# Patient Record
Sex: MALE | Race: WHITE | NOT HISPANIC OR LATINO | Employment: UNEMPLOYED | URBAN - NONMETROPOLITAN AREA
[De-identification: names, ages, dates, MRNs, and addresses within clinical notes are randomized per-mention and may not be internally consistent; named-entity substitution may affect disease eponyms.]

---

## 2017-01-31 ENCOUNTER — OFFICE VISIT (OUTPATIENT)
Dept: FAMILY MEDICINE CLINIC | Facility: CLINIC | Age: 77
End: 2017-01-31

## 2017-01-31 VITALS
OXYGEN SATURATION: 99 % | SYSTOLIC BLOOD PRESSURE: 135 MMHG | BODY MASS INDEX: 17.43 KG/M2 | HEART RATE: 81 BPM | HEIGHT: 68 IN | DIASTOLIC BLOOD PRESSURE: 78 MMHG | WEIGHT: 115 LBS

## 2017-01-31 DIAGNOSIS — B35.1 ONYCHOMYCOSIS: ICD-10-CM

## 2017-01-31 DIAGNOSIS — N28.9 RENAL INSUFFICIENCY: ICD-10-CM

## 2017-01-31 DIAGNOSIS — IMO0001 ELEVATED BLOOD PRESSURE: ICD-10-CM

## 2017-01-31 DIAGNOSIS — E03.8 OTHER SPECIFIED HYPOTHYROIDISM: Primary | ICD-10-CM

## 2017-01-31 DIAGNOSIS — E78.2 MIXED HYPERLIPIDEMIA: ICD-10-CM

## 2017-01-31 DIAGNOSIS — R74.8 ELEVATED LIVER ENZYMES: ICD-10-CM

## 2017-01-31 DIAGNOSIS — J43.8 OTHER EMPHYSEMA (HCC): ICD-10-CM

## 2017-01-31 LAB
ALBUMIN SERPL-MCNC: 4 G/DL (ref 3.4–4.8)
ALBUMIN/GLOB SERPL: 1.3 G/DL (ref 1.5–2.5)
ALP SERPL-CCNC: 86 U/L (ref 46–116)
ALT SERPL W P-5'-P-CCNC: 14 U/L (ref 10–44)
ANION GAP SERPL CALCULATED.3IONS-SCNC: 1.4 MMOL/L (ref 3.6–11.2)
AST SERPL-CCNC: 24 U/L (ref 10–34)
BILIRUB SERPL-MCNC: 0.8 MG/DL (ref 0.2–1.8)
BUN BLD-MCNC: 14 MG/DL (ref 7–21)
BUN/CREAT SERPL: 12 (ref 7–25)
CALCIUM SPEC-SCNC: 9.5 MG/DL (ref 7.7–10)
CHLORIDE SERPL-SCNC: 108 MMOL/L (ref 99–112)
CO2 SERPL-SCNC: 32.6 MMOL/L (ref 24.3–31.9)
CREAT BLD-MCNC: 1.17 MG/DL (ref 0.43–1.29)
GFR SERPL CREATININE-BSD FRML MDRD: 61 ML/MIN/1.73
GLOBULIN UR ELPH-MCNC: 3.1 GM/DL
GLUCOSE BLD-MCNC: 72 MG/DL (ref 70–110)
OSMOLALITY SERPL CALC.SUM OF ELEC: 282.1 MOSM/KG (ref 273–305)
POTASSIUM BLD-SCNC: 3.9 MMOL/L (ref 3.5–5.3)
PROT SERPL-MCNC: 7.1 G/DL (ref 6–8)
SODIUM BLD-SCNC: 142 MMOL/L (ref 135–153)
T4 FREE SERPL-MCNC: 1.93 NG/DL (ref 0.89–1.76)
TSH SERPL DL<=0.05 MIU/L-ACNC: 0.06 MIU/ML (ref 0.55–4.78)

## 2017-01-31 PROCEDURE — 80053 COMPREHEN METABOLIC PANEL: CPT | Performed by: FAMILY MEDICINE

## 2017-01-31 PROCEDURE — 99214 OFFICE O/P EST MOD 30 MIN: CPT | Performed by: FAMILY MEDICINE

## 2017-01-31 PROCEDURE — 84439 ASSAY OF FREE THYROXINE: CPT | Performed by: FAMILY MEDICINE

## 2017-01-31 PROCEDURE — 84443 ASSAY THYROID STIM HORMONE: CPT | Performed by: FAMILY MEDICINE

## 2017-01-31 PROCEDURE — 36415 COLL VENOUS BLD VENIPUNCTURE: CPT | Performed by: FAMILY MEDICINE

## 2017-01-31 RX ORDER — CLOTRIMAZOLE 1 %
CREAM (GRAM) TOPICAL 2 TIMES DAILY
Qty: 45 G | Refills: 0 | Status: SHIPPED | OUTPATIENT
Start: 2017-01-31 | End: 2019-01-01

## 2017-02-06 ENCOUNTER — TELEPHONE (OUTPATIENT)
Dept: FAMILY MEDICINE CLINIC | Facility: CLINIC | Age: 77
End: 2017-02-06

## 2017-02-06 NOTE — TELEPHONE ENCOUNTER
----- Message from Deloris Lewis MD sent at 2/5/2017  7:02 PM EST -----  Please call Onel. His thyroid is underactive again - can we increase his thyroid medication from 150 to 175 mcg orally daily? #30 with 2 refills. I'd like to see him again in 3 months so can we make him appt? Can send to pharm if he is okay with the increase. Thanks.      Phone not accepting calls at this time will attempt later.    Still not available.    Attempted number again & stated unavailable & no way to leave a message.      Letter mailed to contact the office.

## 2017-02-13 NOTE — PROGRESS NOTES
"Onel Erickson     VITALS: Blood pressure 135/78, pulse 81, height 68\" (172.7 cm), weight 115 lb (52.2 kg), SpO2 99 %.    Subjective  Chief Complaint:   Chief Complaint   Patient presents with   • Follow-up        History of Present Illness:  Patient is a 76 y.o. male with a medical history significant for CAD and COPD who presents to clinic secondary to medical followup. No new or acute concerns today. Doing well.     Patient also has a history of hyperlipidemia and is currently on atorvastatin 20 mg orally daily. Denies any side effects of the medications. Last lipid panel in 8/2016 and was ok. Denies any muscle weakness, jaundice or itching.   Low cholesterol diet: Yes    Patient does have a history of hypothyroidism and is currently on synthroid 150 mcg orally daily. He is doing well on this medication. Denies any side effects. Denies fatigue, dizziness, palpitations, changes in weight, hair, or nails. Last thyroid panel was in 8/2016 and was abnormal and changed at that point.    Patient has a history of COPD and is currently on Advair 250/50 1 puff twice daily and Spiriva daily. Denies any side effects of the medications. Denies any shortness of breath, coughing, wheezing, or night coughing. Reports that in the past CXR has shown \"fungus spots on his lungs\".  Exacerbation: No  Last utilized albuterol: Does not have albuterol    Patient has a history of CAD. He sees Dr. Harley, and is currently on Aspirin 81 mg daily. He does have an extensive history, including a cath, 5 bypasses, along with a history of PAD with stents in both legs along with a left arterial bypass in his lower extremity. In the past, he was on plavix, digoxin, imdur, and lasix, but Dr. Harley has since taken him off of these medications as he has been extremely stable in the past. Last saw Dr. Harley some months ago. He quit smoking in 2012.  History of stroke: Yes    The following portions of the patient's history were " reviewed and updated as appropriate: allergies, current medications, past family history, past medical history, past social history, past surgical history and problem list.    Past Medical History  Past Medical History   Diagnosis Date   • Arthritis    • CAD (coronary artery disease)      Sees Dr. Harley   • COPD (chronic obstructive pulmonary disease)    • Former smoker      Stopped in 2012   • Hernia of abdominal wall    • History of TIAs    • Hyperlipidemia    • Hypertension    • Hypothyroidism    • Inguinal hernia    • PAD (peripheral artery disease)    • Ruptured lumbar disc    • Scrotal hernia      Left   • Stroke 2011     10 YEARS AGO   • Ventral hernia        Review of Systems  Constitutional: Denies any recent history of HAs, dizziness, fevers, chills, itching.  Eyes: Denies any changes in vision. Denies any blurry vision or diplopia.  Ears, Nose, Mouth, Throat: Denies any sore throat, rhinorrhea, or cough.  Cardiovascular: Denies any chest pain, pressure, or palpitations.  Respiratory: Denies any shortness of breath or wheezing.  Gastrointestinal: Denies any abdominal pain, nausea, vomiting, diarrhea, or constipation.  Genitourinary: Denies any changes in urination.  Musculoskeletal: Denies any muscle weakness.  Skin and/or breasts: Denies any rashes.  Neurological: Denies any changes in balance or gait.  Psychiatric: Denies any anxiety, depression, or insomnia. Denies any suicidal or homicidal ideations.  Endocrine: Denies any heat or cold intolerance. Denies any voice changes, polydipsia, or polyuria.    Surgical History  Past Surgical History   Procedure Laterality Date   • Back surgery       age 30; spinal fusion   • Hernia repair       age 35; inguinal hernia   • Amputation  06/2012     left great toe amputation   • Cardiac surgery  08/2012     Open heart bypass; [facility]   • Lung biopsy  2012   • Arterial bypass surgery  10/2012     left leg - groin to ankle; Dr. Browne; [facility]  [place].   • Cataract extraction  03/07/2016   • Coronary artery bypass graft  2012     x5   • Inguinal hernia repair Left 9/15/2016     Procedure: INGUINAL HERNIA REPAIR;  Surgeon: Henry Bullock MD;  Location:  [facility];  Service:    • Ventral/incisional hernia repair N/A 9/15/2016     Procedure: VENTRAL/INCISIONAL HERNIA REPAIR;  Surgeon: Henry Bullock MD;  Location:  [facility];  Service:    • Carotid stent Bilateral    • Inguinal hernia repair Left 11/18/2016     Procedure: INGUINAL HERNIA REPAIR;  Surgeon: Henry Bullock MD;  Location:  [facility];  Service:        Family History  Family History   Problem Relation Age of Onset   • Alzheimer's disease Mother    • Heart disease Father        Social History  Social History     Social History   • Marital status: Unknown     Spouse name: N/A   • Number of children: N/A   • Years of education: N/A     Occupational History   • Not on file.     Social History Main Topics   • Smoking status: Former Smoker     Packs/day: 1.00     Years: 30.00     Types: Cigarettes     Quit date: 9/14/2006   • Smokeless tobacco: Never Used   • Alcohol use No   • Drug use: No   • Sexual activity: Defer     Other Topics Concern   • Not on file     Social History Narrative       Objective  Physical Exam  Gen: Patient in NAD. Pleasant and answers appropriately. A&Ox3.    Skin: Warm and dry with normal turgor. No purpura, rashes, or unusual pigmentation noted. Hair is normal in appearance and distribution.    HEENT: NC/AT. No lesions noted. Conjunctiva clear, sclera nonicteric. PERRL. O/P nonerythematous and moist without exudate.    Neck: Supple without lymph nodes palpated. FROM. No evidence of tracheal deviation or thyromegaly. Carotid pulses 2+/4 B/L without bruits.    Lungs: CTA B/L without rales, rhonchi, crackles, or wheezes.    Heart: RRR. S1 and S2 normal. No S3 or S4. No MRGT.    Abd: Soft, nontender,nondistended. (+)BSx4 quadrants. No scars or abnormalities noted. No HSM, masses, or  bruits noted.    Extrem: No CCE. FROMx4. No bone, joint, or muscle tenderness noted.    Neuro: No focal motor/sensory deficits.    Procedures    Assessment/Plan  Onel Erickson is a 76 y.o. here for medical followup.  Diagnoses and all orders for this visit:    1) Other specified hypothyroidism  -     Comprehensive Metabolic Panel  -     TSH  -     T4, Free  -     Osmolality, Calculated; Future  -     Osmolality, Calculated  Check thyroid panel and CMP today. Continue synthroid 150 mcg orally daily.     2) Onychomycosis  -     clotrimazole (LOTRIMIN) 1 % cream; Apply  topically 2 (Two) Times a Day. Over the toenailsDid not go to podiatry secondary to cost. Will prescribe clotrimazole BID over toenails. May need oral medications, but also has a history of elevated liver enzymes. May need to utilize topical solution.     3) History of tobacco abuse  Pending AAA screening - patient needs to call. Will need to also discuss Chest CT with patient given history of CXR.    4) Hyperlipidemia  Stable. Continue atorvastatin 20 mg orally daily. Next lipids 8/2017.    5) COPD  Stable. Continue spiriva daily and advair 2 puffs BID.    6) Elevated liver enzymes  Will check CMP today.    7) Kidney insufficiency history  Will check CMP today.    8) Elevated blood pressures without diagnosis of hypertension  Improved today. Will continue to monitor. If continued elevation, especially with CAD history, will need to place on medication .    9) Preventative Medicine  PSA 8/2016 WNL. Declines flu and pneumo vaccines. AAA screening pending.    Findings and plans discussed with patient who verbalizes understanding and agreement. Will followup with patient once results are in. Patient to followup at clinic PRN or in three months for medical followup.    Deloris Lewis MD

## 2017-06-06 ENCOUNTER — TELEPHONE (OUTPATIENT)
Dept: FAMILY MEDICINE CLINIC | Facility: CLINIC | Age: 77
End: 2017-06-06

## 2017-06-06 ENCOUNTER — OFFICE VISIT (OUTPATIENT)
Dept: FAMILY MEDICINE CLINIC | Facility: CLINIC | Age: 77
End: 2017-06-06

## 2017-06-06 VITALS
TEMPERATURE: 97.5 F | HEIGHT: 67 IN | DIASTOLIC BLOOD PRESSURE: 94 MMHG | BODY MASS INDEX: 17.11 KG/M2 | OXYGEN SATURATION: 96 % | SYSTOLIC BLOOD PRESSURE: 150 MMHG | WEIGHT: 109 LBS | HEART RATE: 95 BPM

## 2017-06-06 DIAGNOSIS — E03.8 OTHER SPECIFIED HYPOTHYROIDISM: ICD-10-CM

## 2017-06-06 DIAGNOSIS — R63.4 WEIGHT LOSS: ICD-10-CM

## 2017-06-06 DIAGNOSIS — M25.512 CHRONIC LEFT SHOULDER PAIN: Primary | ICD-10-CM

## 2017-06-06 DIAGNOSIS — G89.29 CHRONIC LEFT SHOULDER PAIN: Primary | ICD-10-CM

## 2017-06-06 DIAGNOSIS — E78.2 MIXED HYPERLIPIDEMIA: ICD-10-CM

## 2017-06-06 DIAGNOSIS — R74.8 ELEVATED LIVER ENZYMES: ICD-10-CM

## 2017-06-06 DIAGNOSIS — Z87.891 HISTORY OF TOBACCO ABUSE: ICD-10-CM

## 2017-06-06 DIAGNOSIS — N28.9 RENAL INSUFFICIENCY: ICD-10-CM

## 2017-06-06 LAB
ALBUMIN SERPL-MCNC: 4.3 G/DL (ref 3.4–4.8)
ALBUMIN/GLOB SERPL: 1.2 G/DL (ref 1.5–2.5)
ALP SERPL-CCNC: 93 U/L (ref 40–129)
ALT SERPL W P-5'-P-CCNC: 17 U/L (ref 10–44)
ANION GAP SERPL CALCULATED.3IONS-SCNC: 3 MMOL/L (ref 3.6–11.2)
AST SERPL-CCNC: 23 U/L (ref 10–34)
BASOPHILS # BLD AUTO: 0.02 10*3/MM3 (ref 0–0.3)
BASOPHILS NFR BLD AUTO: 0.3 % (ref 0–2)
BILIRUB SERPL-MCNC: 1.3 MG/DL (ref 0.2–1.8)
BUN BLD-MCNC: 16 MG/DL (ref 7–21)
BUN/CREAT SERPL: 14.8 (ref 7–25)
CALCIUM SPEC-SCNC: 10 MG/DL (ref 7.7–10)
CHLORIDE SERPL-SCNC: 105 MMOL/L (ref 99–112)
CO2 SERPL-SCNC: 30 MMOL/L (ref 24.3–31.9)
CREAT BLD-MCNC: 1.08 MG/DL (ref 0.43–1.29)
DEPRECATED RDW RBC AUTO: 48.2 FL (ref 37–54)
EOSINOPHIL # BLD AUTO: 0.14 10*3/MM3 (ref 0–0.7)
EOSINOPHIL NFR BLD AUTO: 2.1 % (ref 0–7)
ERYTHROCYTE [DISTWIDTH] IN BLOOD BY AUTOMATED COUNT: 13.8 % (ref 11.5–14.5)
GFR SERPL CREATININE-BSD FRML MDRD: 66 ML/MIN/1.73
GLOBULIN UR ELPH-MCNC: 3.5 GM/DL
GLUCOSE BLD-MCNC: 86 MG/DL (ref 70–110)
HCT VFR BLD AUTO: 46.2 % (ref 42–52)
HGB BLD-MCNC: 15.3 G/DL (ref 14–18)
IMM GRANULOCYTES # BLD: 0.01 10*3/MM3 (ref 0–0.03)
IMM GRANULOCYTES NFR BLD: 0.2 % (ref 0–0.5)
LYMPHOCYTES # BLD AUTO: 0.98 10*3/MM3 (ref 1–3)
LYMPHOCYTES NFR BLD AUTO: 15 % (ref 16–46)
MCH RBC QN AUTO: 31.6 PG (ref 27–33)
MCHC RBC AUTO-ENTMCNC: 33.1 G/DL (ref 33–37)
MCV RBC AUTO: 95.5 FL (ref 80–94)
MONOCYTES # BLD AUTO: 0.59 10*3/MM3 (ref 0.1–0.9)
MONOCYTES NFR BLD AUTO: 9 % (ref 0–12)
NEUTROPHILS # BLD AUTO: 4.8 10*3/MM3 (ref 1.4–6.5)
NEUTROPHILS NFR BLD AUTO: 73.4 % (ref 40–75)
OSMOLALITY SERPL CALC.SUM OF ELEC: 276.2 MOSM/KG (ref 273–305)
PLATELET # BLD AUTO: 129 10*3/MM3 (ref 130–400)
PMV BLD AUTO: 12 FL (ref 6–10)
POTASSIUM BLD-SCNC: 4.2 MMOL/L (ref 3.5–5.3)
PROT SERPL-MCNC: 7.8 G/DL (ref 6–8)
RBC # BLD AUTO: 4.84 10*6/MM3 (ref 4.7–6.1)
SODIUM BLD-SCNC: 138 MMOL/L (ref 135–153)
T4 FREE SERPL-MCNC: 2.21 NG/DL (ref 0.89–1.76)
TSH SERPL DL<=0.05 MIU/L-ACNC: 0.02 MIU/ML (ref 0.55–4.78)
WBC NRBC COR # BLD: 6.54 10*3/MM3 (ref 4.5–12.5)

## 2017-06-06 PROCEDURE — 80053 COMPREHEN METABOLIC PANEL: CPT | Performed by: FAMILY MEDICINE

## 2017-06-06 PROCEDURE — 85060 BLOOD SMEAR INTERPRETATION: CPT | Performed by: FAMILY MEDICINE

## 2017-06-06 PROCEDURE — 84443 ASSAY THYROID STIM HORMONE: CPT | Performed by: FAMILY MEDICINE

## 2017-06-06 PROCEDURE — 36415 COLL VENOUS BLD VENIPUNCTURE: CPT | Performed by: FAMILY MEDICINE

## 2017-06-06 PROCEDURE — 85025 COMPLETE CBC W/AUTO DIFF WBC: CPT | Performed by: FAMILY MEDICINE

## 2017-06-06 PROCEDURE — 84439 ASSAY OF FREE THYROXINE: CPT | Performed by: FAMILY MEDICINE

## 2017-06-06 PROCEDURE — 99214 OFFICE O/P EST MOD 30 MIN: CPT | Performed by: FAMILY MEDICINE

## 2017-06-06 RX ORDER — LEVOTHYROXINE SODIUM 175 UG/1
175 TABLET ORAL DAILY
Qty: 30 TABLET | Refills: 2 | Status: SHIPPED | OUTPATIENT
Start: 2017-06-06 | End: 2017-07-12 | Stop reason: SDUPTHER

## 2017-06-06 NOTE — TELEPHONE ENCOUNTER
----- Message from Deloris Lewis MD sent at 6/6/2017  3:37 PM EDT -----  Patient doesn't have a phone. Will have to send letter.  1) Thyroid is off. We are going to increase his synthroid to 175 mcg. New script at the pharmacy already.  2) He has low platelets; we will monitor.    Can you see if they can add on a peripheral blood smear. Thanks.        Added peripheral smear,letter mailed with above information.

## 2017-06-06 NOTE — PATIENT INSTRUCTIONS
Keep everything the same. If the thyroid is off, you will get a new dosage on your next medication.

## 2017-06-06 NOTE — PROGRESS NOTES
"Onel Erickson     VITALS: Blood pressure 150/94, pulse 95, temperature 97.5 °F (36.4 °C), temperature source Oral, height 67\" (170.2 cm), weight 109 lb (49.4 kg), SpO2 96 %.    Subjective  Chief Complaint:   Chief Complaint   Patient presents with   • Follow-up     Medication refill        History of Present Illness:  Patient is a 77 y.o. male with a medical history significant for CAD and COPD who presents to clinic secondary to medical followup. No new or acute concerns today. Doing well. He does complain of continued left shoulder pain from an old shoulder injury. He does stretches and exercises at home. Denies any new injury or trauma. Does not want to go to PT. States that it is well, but he would like a shoulder brace for it when it aggravates him.    Patient also has a history of hyperlipidemia and is currently on atorvastatin 20 mg orally daily. Denies any side effects of the medications. Last lipid panel in 8/2016 and was ok. Denies any muscle weakness, jaundice or itching.   Low cholesterol diet: Yes    Patient does have a history of hypothyroidism and is currently on synthroid 150 mcg orally daily. He is doing well on this medication. Denies any side effects. Denies fatigue, dizziness, palpitations, changes in weight, hair, or nails. Last thyroid panel was in 1/2017 and was abnormal. We had tried to call him to increase to 175 mcg but we could never reach him.     Patient has a history of COPD and is currently on Advair 250/50 1 puff twice daily and Spiriva daily. Denies any side effects of the medications. Denies any shortness of breath, coughing, wheezing, or night coughing. Reports that in the past CXR has shown \"fungus spots on his lungs\".  Exacerbation: No  Last utilized albuterol: Does not have albuterol    Patient has a history of CAD. He sees Dr. Harley, and is currently on Aspirin 81 mg daily. He does have an extensive history, including a cath, 5 bypasses, along with a history of PAD " with stents in both legs along with a left arterial bypass in his lower extremity. In the past, he was on plavix, digoxin, imdur, and lasix, but Dr. Harley has since taken him off of these medications as he has been extremely stable in the past. Last saw Dr. Harley some months ago. He quit smoking in 2012.  History of stroke: Yes     The following portions of the patient's history were reviewed and updated as appropriate: allergies, current medications, past family history, past medical history, past social history, past surgical history and problem list.    Past Medical History  Past Medical History:   Diagnosis Date   • Arthritis    • CAD (coronary artery disease)     Sees Dr. Harley   • COPD (chronic obstructive pulmonary disease)    • Former smoker     Stopped in 2012   • Hernia of abdominal wall    • History of TIAs    • Hyperlipidemia    • Hypertension    • Hypothyroidism    • Inguinal hernia    • PAD (peripheral artery disease)    • Ruptured lumbar disc    • Scrotal hernia     Left   • Stroke 2011    10 YEARS AGO   • Ventral hernia        Review of Systems  Constitutional: Denies any recent history of HAs, dizziness, fevers, chills, itching.  Eyes: Denies any changes in vision. Denies any blurry vision or diplopia.  Ears, Nose, Mouth, Throat: Denies any sore throat, rhinorrhea, or cough.  Cardiovascular: Denies any chest pain, pressure, or palpitations.  Respiratory: Denies any shortness of breath or wheezing.  Gastrointestinal: Denies any abdominal pain, nausea, vomiting, diarrhea, or constipation.  Skin and/or breasts: Denies any rashes.  Neurological: Denies any changes in balance or gait.  Psychiatric: Denies any anxiety, depression, or insomnia. Denies any suicidal or homicidal ideations.  Endocrine: Denies any heat or cold intolerance. Denies any voice changes, polydipsia, or polyuria.  Hematologic/Lymphatic: Denies any anemia or easy bruising.    Surgical History  Past Surgical History:    Procedure Laterality Date   • AMPUTATION  06/2012    left great toe amputation   • ARTERIAL BYPASS SURGERY  10/2012    left leg - groin to ankle; Dr. Browne; [facility].   • BACK SURGERY      age 30; spinal fusion   • CARDIAC SURGERY  08/2012    Open heart bypass; [facility]   • CAROTID STENT Bilateral    • CATARACT EXTRACTION  03/07/2016   • CORONARY ARTERY BYPASS GRAFT  2012    x5   • HERNIA REPAIR      age 35; inguinal hernia   • INGUINAL HERNIA REPAIR Left 9/15/2016    Procedure: INGUINAL HERNIA REPAIR;  Surgeon: Henry Bullock MD;  Location:  [facility];  Service:    • INGUINAL HERNIA REPAIR Left 11/18/2016    Procedure: INGUINAL HERNIA REPAIR;  Surgeon: Henry Bullock MD;  Location:  [facility];  Service:    • LUNG BIOPSY  2012   • VENTRAL/INCISIONAL HERNIA REPAIR N/A 9/15/2016    Procedure: VENTRAL/INCISIONAL HERNIA REPAIR;  Surgeon: Henry Bullock MD;  Location:  [facility];  Service:        Family History  Family History   Problem Relation Age of Onset   • Alzheimer's disease Mother    • Heart disease Father        Social History  Social History     Social History   • Marital status: Unknown     Spouse name: N/A   • Number of children: N/A   • Years of education: N/A     Occupational History   • Not on file.     Social History Main Topics   • Smoking status: Former Smoker     Packs/day: 1.00     Years: 30.00     Types: Cigarettes     Quit date: 9/14/2006   • Smokeless tobacco: Never Used   • Alcohol use No   • Drug use: No   • Sexual activity: Defer     Other Topics Concern   • Not on file     Social History Narrative       Objective  Physical Exam  Gen: Patient in NAD. Pleasant and answers appropriately. A&Ox3.    Skin: Warm and dry with normal turgor. No purpura, rashes, or unusual pigmentation noted. Hair is normal in appearance and distribution.    HEENT: NC/AT. No lesions noted. Conjunctiva clear, sclera nonicteric. PERRL. EOMI without nystagmus or strabismus. Fundi appear benign. No  hemorrhages or exudates of eyes. Auditory canals are patent bilaterally without lesions. TMs intact,  nonerythematous, nonbulging without lesions. Nasal mucosa pink, nonerythematous, and nonedematous. Frontal and maxillary sinuses are nontender. O/P nonerythematous and moist without exudate.    Neck: Supple without lymph nodes palpated. FROM.     Lungs: CTA B/L without rales, rhonchi, crackles, or wheezes.    Heart: RRR. S1 and S2 normal. No S3 or S4. No MRGT.    Abd: Soft, nontender,nondistended. (+)BSx4 quadrants.     Extrem: No CCE. Radial pulses 2+/4 and equal B/L. FROMx4. No bone, joint, or muscle tenderness noted. Left upper extremity: Shoulder ROM WNL. Abduction, adduction, external and internal rotation WNL. Benign drop arm, HK, Sulcus, Speeds.    Neuro: No focal motor/sensory deficits.    Procedures    Assessment/Plan  Onel Erickson is a 77 y.o. here for medical followup.  Diagnoses and all orders for this visit:     Left Shoulder pain  Patient declines PT and further workup. States that his exercises are doing him well. Will try a shoulder brace to help. Continue to monitor.    Weight Loss  Unexplained weight loss of 6 pounds. Patient states that he feels fine. States that he has a good appetite. Will check CBC and CMP. Continue to monitor.     Other specified hypothyroidism  - Comprehensive Metabolic Panel  - TSH  - T4, Free  Check thyroid panel and CMP today. Continue synthroid 150 mcg orally daily. Will most likely need to increase 175 mcg orally daily.     Onychomycosis  Did not go to podiatry secondary to cost. Continue clotrimazole BID over toenails. May need oral medications, but also has a history of elevated liver enzymes. May need to utilize topical solution.      History of tobacco abuse  Pending AAA screening - patient needs to call. Patient declines Chest CT at this time.     Hyperlipidemia  Stable. Continue atorvastatin 20 mg orally daily. Next lipids 8/2017.     COPD  Stable. Continue  spiriva daily and advair 2 puffs BID.     Elevated liver enzymes  Will check CMP today.     Kidney insufficiency history  Will check CMP today.     Elevated blood pressures without diagnosis of hypertension  Elevated today. Will continue to monitor. If continued elevation, especially with CAD history, will need to place on medication .     Preventative Medicine  PSA 8/2016 WNL. Declines flu and pneumo vaccines. AAA screening pending.    Findings and plans discussed with patient who verbalizes understanding and agreement. Will followup with patient once results are in. Patient to followup at clinic PRN or in three months for medical followup.      Deloris Lewis MD

## 2017-06-12 ENCOUNTER — OFFICE VISIT (OUTPATIENT)
Dept: FAMILY MEDICINE CLINIC | Facility: CLINIC | Age: 77
End: 2017-06-12

## 2017-06-12 VITALS
WEIGHT: 109 LBS | BODY MASS INDEX: 17.11 KG/M2 | OXYGEN SATURATION: 98 % | HEART RATE: 85 BPM | HEIGHT: 67 IN | DIASTOLIC BLOOD PRESSURE: 81 MMHG | SYSTOLIC BLOOD PRESSURE: 149 MMHG

## 2017-06-12 DIAGNOSIS — R03.0 ELEVATED BP WITHOUT DIAGNOSIS OF HYPERTENSION: ICD-10-CM

## 2017-06-12 DIAGNOSIS — E03.8 OTHER SPECIFIED HYPOTHYROIDISM: ICD-10-CM

## 2017-06-12 DIAGNOSIS — N28.9 RENAL INSUFFICIENCY: ICD-10-CM
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
## 2017-06-12 DIAGNOSIS — J43.8 OTHER EMPHYSEMA (HCC): ICD-10-CM

## 2017-06-12 DIAGNOSIS — E78.2 MIXED HYPERLIPIDEMIA: ICD-10-CM

## 2017-06-12 PROCEDURE — 99214 OFFICE O/P EST MOD 30 MIN: CPT | Performed by: FAMILY MEDICINE

## 2017-06-12 RX ORDER — ATORVASTATIN CALCIUM 20 MG/1
20 TABLET, FILM COATED ORAL DAILY
Qty: 30 TABLET | Refills: 5 | Status: SHIPPED | OUTPATIENT
Start: 2017-06-12 | End: 2017-09-25 | Stop reason: SDUPTHER

## 2017-06-12 RX ORDER — DOXYCYCLINE HYCLATE 100 MG/1
100 TABLET, DELAYED RELEASE ORAL 2 TIMES DAILY
Qty: 28 TABLET | Refills: 0 | Status: SHIPPED | OUTPATIENT
Start: 2017-06-12 | End: 2017-11-01

## 2017-07-10 NOTE — PROGRESS NOTES
"Onel Erickson     VITALS: Blood pressure 149/81, pulse 85, height 67\" (170.2 cm), weight 109 lb (49.4 kg), SpO2 98 %.    Subjective  Chief Complaint:   Chief Complaint   Patient presents with   • Insect Bite        History of Present Illness:  Patient is a 77 y.o. male with a medical history significant for CAD and COPD who presents to clinic secondary to medical followup. He is here today because he has had several tick bites on his legs that are now erythematous and has a bull-eyes look to them. He states that he thinks that he has gotten all of the ticks off of him.     Patient also has a history of hyperlipidemia and is currently on atorvastatin 20 mg orally daily. Denies any side effects of the medications. Last lipid panel in 8/2016 and was ok. Denies any muscle weakness, jaundice or itching.   Low cholesterol diet: Yes    Patient does have a history of hypothyroidism and is currently on synthroid 150 mcg orally daily. He is doing well on this medication. Denies any side effects. Denies fatigue, dizziness, palpitations, changes in weight, hair, or nails. Last thyroid panel was in 6/2017 and was abnormal. We had tried to call him to increase to 175 mcg but we could never reach him.     Patient has a history of COPD and is currently on Advair 250/50 1 puff twice daily and Spiriva daily. Denies any side effects of the medications. Denies any shortness of breath, coughing, wheezing, or night coughing. Reports that in the past CXR has shown \"fungus spots on his lungs\".  Exacerbation: No  Last utilized albuterol: Does not have albuterol    Patient has a history of CAD. He sees Dr. Harley, and is currently on Aspirin 81 mg daily. He does have an extensive history, including a cath, 5 bypasses, along with a history of PAD with stents in both legs along with a left arterial bypass in his lower extremity. In the past, he was on plavix, digoxin, imdur, and lasix, but Dr. Harley has since taken him off of " these medications as he has been extremely stable in the past. Last saw Dr. Harley some months ago. He quit smoking in 2012.  History of stroke: Yes     No complaints about any of the medications.    The following portions of the patient's history were reviewed and updated as appropriate: allergies, current medications, past family history, past medical history, past social history, past surgical history and problem list.    Past Medical History  Past Medical History:   Diagnosis Date   • Arthritis    • CAD (coronary artery disease)     Sees Dr. Harley   • COPD (chronic obstructive pulmonary disease)    • Former smoker     Stopped in 2012   • Hernia of abdominal wall    • History of TIAs    • Hyperlipidemia    • Hypertension    • Hypothyroidism    • Inguinal hernia    • PAD (peripheral artery disease)    • Ruptured lumbar disc    • Scrotal hernia     Left   • Stroke 2011    10 YEARS AGO   • Ventral hernia        Review of Systems  Constitutional: Denies any recent history of fevers, chills, itching.  Eyes: Denies any changes in vision. Denies any blurry vision or diplopia.  Ears, Nose, Mouth, Throat: Denies any sore throat, rhinorrhea, or cough.  Cardiovascular: Denies any chest pain, pressure, or palpitations.  Respiratory: Denies any shortness of breath or wheezing.  Gastrointestinal: Denies any abdominal pain, nausea, vomiting, diarrhea, or constipation.  Genitourinary: Denies any changes in urination.  Neurological: Denies any changes in balance or gait.  Psychiatric: Denies any suicidal or homicidal ideations.  Endocrine: Denies any heat or cold intolerance. Denies any voice changes, polydipsia, or polyuria.  Hematologic/Lymphatic: Denies any anemia or easy bruising.    Surgical History  Past Surgical History:   Procedure Laterality Date   • AMPUTATION  06/2012    left great toe amputation   • ARTERIAL BYPASS SURGERY  10/2012    left leg - groin to ankle; Dr. Browne; [facility].   • BACK SURGERY       age 30; spinal fusion   • CARDIAC SURGERY  08/2012    Open heart bypass; [facility]   • CAROTID STENT Bilateral    • CATARACT EXTRACTION  03/07/2016   • CORONARY ARTERY BYPASS GRAFT  2012    x5   • HERNIA REPAIR      age 35; inguinal hernia   • INGUINAL HERNIA REPAIR Left 9/15/2016    Procedure: INGUINAL HERNIA REPAIR;  Surgeon: Henry Bullock MD;  [facility]:  [facility];  Service:    • INGUINAL HERNIA REPAIR Left 11/18/2016    Procedure: INGUINAL HERNIA REPAIR;  Surgeon: Henry Bullock MD;  Location:  [facility];  Service:    • LUNG BIOPSY  2012   • VENTRAL/INCISIONAL HERNIA REPAIR N/A 9/15/2016    Procedure: VENTRAL/INCISIONAL HERNIA REPAIR;  Surgeon: Henry Bullock MD;  Location:  [facility];  Service:        Family History  Family History   Problem Relation Age of Onset   • Alzheimer's disease Mother    • Heart disease Father        Social History  Social History     Social History   • Marital status: Unknown     Spouse name: N/A   • Number of children: N/A   • Years of education: N/A     Occupational History   • Not on file.     Social History Main Topics   • Smoking status: Former Smoker     Packs/day: 1.00     Years: 30.00     Types: Cigarettes     Quit date: 9/14/2006   • Smokeless tobacco: Never Used   • Alcohol use No   • Drug use: No   • Sexual activity: Defer     Other Topics Concern   • Not on file     Social History Narrative       Objective  Physical Exam  Gen: Patient in NAD. Pleasant and answers appropriately. A&Ox3.    Skin: Warm and dry with normal turgor. No purpura or unusual pigmentation noted. Hair is normal in appearance and distribution. Several tick bites with bulls eyes erythema over his bilateral legs without any edema, increased warmth, or discharge.     HEENT: NC/AT. No lesions noted. Conjunctiva clear, sclera nonicteric. PERRL. EOMI without nystagmus or strabismus. Fundi appear benign. No hemorrhages or exudates of eyes. Auditory canals are patent bilaterally without lesions.  TMs intact,  nonerythematous, nonbulging without lesions. Nasal mucosa pink, nonerythematous, and nonedematous. Frontal and maxillary sinuses are nontender. O/P nonerythematous and moist without exudate.    Neck: Supple without lymph nodes palpated. FROM.     Lungs: CTA B/L without rales, rhonchi, crackles, or wheezes.    Heart: RRR. S1 and S2 normal. No S3 or S4. No MRGT.    Abd: Soft, nontender,nondistended. (+)BSx4 quadrants.     Extrem: No CCE. Radial pulses 2+/4 and equal B/L. FROMx4. No bone, joint, or muscle tenderness noted.    Neuro: No focal motor/sensory deficits.    Procedures    Assessment/Plan  Onel ANDERSON Georgina is a 77 y.o. here for medical followup.    Tick bites  Will start patient on doxycycline 100 mg orally BID x 14 days.    Other specified hypothyroidism  Check thyroid panel 9/2017. Increase synthroid to 175 mcg orally daily.      Onychomycosis  Did not go to podiatry secondary to cost. Continue clotrimazole BID over toenails. May need oral medications, but also has a history of elevated liver enzymes. May need to utilize topical solution.       History of tobacco abuse  Pending AAA screening - patient needs to call. Patient declines Chest CT at this time.     Hyperlipidemia  Stable. Continue atorvastatin 20 mg orally daily. Refilled. Next lipids 8/2017.      COPD  Stable. Continue spiriva daily (refilled)  and advair 2 puffs BID.      Elevated liver enzymes  Resolved.      Kidney insufficiency history  Resolved.      Elevated blood pressures without diagnosis of hypertension  Elevated today. Will continue to monitor. If continued elevation, especially with CAD history, will need to place on medication .      Preventative Medicine  PSA 8/2016 WNL. Declines flu and pneumo vaccines. AAA screening pending.    Findings and plans discussed with patient who verbalizes understanding and agreement. Will followup with patient once results are in. Patient to followup at clinic PRN or in three months for  medical followup.     Deloris Lewis MD

## 2017-07-12 ENCOUNTER — OFFICE VISIT (OUTPATIENT)
Dept: FAMILY MEDICINE CLINIC | Facility: CLINIC | Age: 77
End: 2017-07-12

## 2017-07-12 ENCOUNTER — TELEPHONE (OUTPATIENT)
Dept: FAMILY MEDICINE CLINIC | Facility: CLINIC | Age: 77
End: 2017-07-12

## 2017-07-12 VITALS
WEIGHT: 106 LBS | SYSTOLIC BLOOD PRESSURE: 148 MMHG | HEART RATE: 98 BPM | DIASTOLIC BLOOD PRESSURE: 76 MMHG | OXYGEN SATURATION: 92 % | BODY MASS INDEX: 16.64 KG/M2 | HEIGHT: 67 IN

## 2017-07-12 DIAGNOSIS — E78.2 MIXED HYPERLIPIDEMIA: ICD-10-CM

## 2017-07-12 DIAGNOSIS — E03.8 OTHER SPECIFIED HYPOTHYROIDISM: ICD-10-CM

## 2017-07-12 DIAGNOSIS — D69.6 THROMBOCYTOPENIA (HCC): Primary | ICD-10-CM

## 2017-07-12 DIAGNOSIS — I10 ESSENTIAL HYPERTENSION: ICD-10-CM

## 2017-07-12 LAB
BASOPHILS # BLD AUTO: 0.03 10*3/MM3 (ref 0–0.3)
BASOPHILS NFR BLD AUTO: 0.4 % (ref 0–2)
DEPRECATED RDW RBC AUTO: 46.7 FL (ref 37–54)
EOSINOPHIL # BLD AUTO: 0.18 10*3/MM3 (ref 0–0.7)
EOSINOPHIL NFR BLD AUTO: 2.7 % (ref 0–7)
ERYTHROCYTE [DISTWIDTH] IN BLOOD BY AUTOMATED COUNT: 14 % (ref 11.5–14.5)
HCT VFR BLD AUTO: 44.4 % (ref 42–52)
HGB BLD-MCNC: 14.5 G/DL (ref 14–18)
IMM GRANULOCYTES # BLD: 0.02 10*3/MM3 (ref 0–0.03)
IMM GRANULOCYTES NFR BLD: 0.3 % (ref 0–0.5)
LYMPHOCYTES # BLD AUTO: 1.15 10*3/MM3 (ref 1–3)
LYMPHOCYTES NFR BLD AUTO: 17.1 % (ref 16–46)
MCH RBC QN AUTO: 31 PG (ref 27–33)
MCHC RBC AUTO-ENTMCNC: 32.7 G/DL (ref 33–37)
MCV RBC AUTO: 94.9 FL (ref 80–94)
MONOCYTES # BLD AUTO: 0.58 10*3/MM3 (ref 0.1–0.9)
MONOCYTES NFR BLD AUTO: 8.6 % (ref 0–12)
NEUTROPHILS # BLD AUTO: 4.77 10*3/MM3 (ref 1.4–6.5)
NEUTROPHILS NFR BLD AUTO: 70.9 % (ref 40–75)
PLATELET # BLD AUTO: 146 10*3/MM3 (ref 130–400)
PMV BLD AUTO: 11.7 FL (ref 6–10)
RBC # BLD AUTO: 4.68 10*6/MM3 (ref 4.7–6.1)
WBC NRBC COR # BLD: 6.73 10*3/MM3 (ref 4.5–12.5)

## 2017-07-12 PROCEDURE — 36415 COLL VENOUS BLD VENIPUNCTURE: CPT | Performed by: FAMILY MEDICINE

## 2017-07-12 PROCEDURE — 99214 OFFICE O/P EST MOD 30 MIN: CPT | Performed by: FAMILY MEDICINE

## 2017-07-12 PROCEDURE — 85025 COMPLETE CBC W/AUTO DIFF WBC: CPT | Performed by: FAMILY MEDICINE

## 2017-07-12 RX ORDER — LEVOTHYROXINE SODIUM 175 UG/1
175 TABLET ORAL DAILY
Qty: 30 TABLET | Refills: 5 | Status: SHIPPED | OUTPATIENT
Start: 2017-07-12 | End: 2017-09-11 | Stop reason: SDUPTHER

## 2017-07-12 RX ORDER — LISINOPRIL 10 MG/1
10 TABLET ORAL DAILY
Qty: 30 TABLET | Refills: 2 | Status: SHIPPED | OUTPATIENT
Start: 2017-07-12 | End: 2017-09-11 | Stop reason: SDUPTHER

## 2017-07-12 NOTE — PROGRESS NOTES
"Onel Erickson     VITALS: Blood pressure 148/76, pulse 98, height 67\" (170.2 cm), weight 106 lb (48.1 kg), SpO2 92 %.    Subjective  Chief Complaint:   Chief Complaint   Patient presents with   • Follow-up        History of Present Illness:  Patient is a 77 y.o. male with a medical history significant for CAD and COPD who presents to clinic secondary to medical followup. Tick bites are better.  No new or acute concerns.  He is doing well.    Patient also has a history of hyperlipidemia and is currently on atorvastatin 20 mg orally daily. Denies any side effects of the medications. Last lipid panel in 8/2016 and was ok. Denies any muscle weakness, jaundice or itching.   Low cholesterol diet: Yes    Patient does have a history of hypothyroidism and is currently on synthroid 175 mcg orally daily. He is doing well on this medication. Denies any side effects. Denies fatigue, dizziness, palpitations, changes in weight, hair, or nails. Last thyroid panel was in 6/2017 and was abnormal. We had tried to call him to increase to 175 mcg but we could never reach him.     Patient has a history of COPD and is currently on Advair 250/50 1 puff twice daily and Spiriva daily. Denies any side effects of the medications. Denies any shortness of breath, coughing, wheezing, or night coughing. Reports that in the past CXR has shown \"fungus spots on his lungs\".  Exacerbation: No  Last utilized albuterol: Does not have albuterol    Patient has a history of CAD. He sees Dr. Harley, and is currently on Aspirin 81 mg daily. He does have an extensive history, including a cath, 5 bypasses, along with a history of PAD with stents in both legs along with a left arterial bypass in his lower extremity. In the past, he was on plavix, digoxin, imdur, and lasix, but Dr. Harley has since taken him off of these medications as he has been extremely stable in the past. Last saw Dr. Harley some months ago. He quit smoking in 2012.  History " of stroke: Yes     No complaints about any of the medications.    The following portions of the patient's history were reviewed and updated as appropriate: allergies, current medications, past family history, past medical history, past social history, past surgical history and problem list.    Past Medical History  Past Medical History:   Diagnosis Date   • Arthritis    • CAD (coronary artery disease)     Sees Dr. Harley   • COPD (chronic obstructive pulmonary disease)    • Former smoker     Stopped in 2012   • Hernia of abdominal wall    • History of TIAs    • Hyperlipidemia    • Hypertension    • Hypothyroidism    • Inguinal hernia    • PAD (peripheral artery disease)    • Ruptured lumbar disc    • Scrotal hernia     Left   • Stroke 2011    10 YEARS AGO   • Ventral hernia        Review of Systems  Constitutional: Denies any recent history of HAs, dizziness, fevers, chills, itching.  Eyes: Denies any changes in vision. Denies any blurry vision or diplopia.  Ears, Nose, Mouth, Throat: Denies any sore throat, rhinorrhea, or cough.  Cardiovascular: Denies any chest pain, pressure, or palpitations.  Respiratory: Denies any shortness of breath or wheezing.  Gastrointestinal: Denies any abdominal pain, nausea, vomiting, diarrhea, or constipation.  Genitourinary: Denies any changes in urination.  Musculoskeletal: Denies any muscle weakness.  Skin and/or breasts: Denies any rashes.  Neurological: Denies any changes in balance or gait.  Psychiatric: Denies any anxiety, depression, or insomnia. Denies any suicidal or homicidal ideations.  Endocrine: Denies any heat or cold intolerance. Denies any voice changes, polydipsia, or polyuria.  Hematologic/Lymphatic: Denies any anemia or easy bruising.    Surgical History  Past Surgical History:   Procedure Laterality Date   • AMPUTATION  06/2012    left great toe amputation   • ARTERIAL BYPASS SURGERY  10/2012    left leg - groin to ankle; Dr. Browne; [facility].   •  BACK SURGERY      age 30; spinal fusion   • CARDIAC SURGERY  08/2012    Open heart bypass; [facility]   • CAROTID STENT Bilateral    • CATARACT EXTRACTION  03/07/2016   • CORONARY ARTERY BYPASS GRAFT  2012    x5   • HERNIA REPAIR      age 35; inguinal hernia   • INGUINAL HERNIA REPAIR Left 9/15/2016    Procedure: INGUINAL HERNIA REPAIR;  Surgeon: Henry Bullock MD;  Location:  [facility];  Service:    • INGUINAL HERNIA REPAIR Left 11/18/2016    Procedure: INGUINAL HERNIA REPAIR;  Surgeon: Henry Bullock MD;  [facility]:  [facility];  Service:    • LUNG BIOPSY  2012   • VENTRAL/INCISIONAL HERNIA REPAIR N/A 9/15/2016    Procedure: VENTRAL/INCISIONAL HERNIA REPAIR;  Surgeon: Henry Bullock MD;  Location:  [facility];  Service:        Family History  Family History   Problem Relation Age of Onset   • Alzheimer's disease Mother    • Heart disease Father        Social History  Social History     Social History   • Marital status: Unknown     Spouse name: N/A   • Number of children: N/A   • Years of education: N/A     Occupational History   • Not on file.     Social History Main Topics   • Smoking status: Former Smoker     Packs/day: 1.00     Years: 30.00     Types: Cigarettes     Quit date: 9/14/2006   • Smokeless tobacco: Never Used   • Alcohol use No   • Drug use: No   • Sexual activity: Defer     Other Topics Concern   • Not on file     Social History Narrative       Objective  Physical Exam  Gen: Patient in NAD. Pleasant and answers appropriately. A&Ox3.    Skin: Warm and dry with normal turgor. No purpura, rashes, or unusual pigmentation noted. Hair is normal in appearance and distribution.    HEENT: NC/AT. No lesions noted. Conjunctiva clear, sclera nonicteric. PERRL. EOMI without nystagmus or strabismus. Fundi appear benign. No hemorrhages or exudates of eyes. Auditory canals are patent bilaterally without lesions. TMs intact,  nonerythematous, nonbulging without lesions. Nasal mucosa pink, nonerythematous, and  nonedematous. Frontal and maxillary sinuses are nontender. O/P nonerythematous and moist without exudate.    Neck: Supple without lymph nodes palpated. FROM.     Lungs: CTA B/L without rales, rhonchi, crackles, or wheezes.    Heart: RRR. S1 and S2 normal. No S3 or S4. No MRGT.    Abd: Soft, nontender,nondistended. (+)BSx4 quadrants.     Extrem: No CCE. Radial pulses 2+/4 and equal B/L. FROMx4. No bone, joint, or muscle tenderness noted.    Neuro: No focal motor/sensory deficits.    Procedures    Assessment/Plan  Onel Erickson is a 77 y.o. here for medical followup.  Diagnoses and all orders for this visit:    Thrombocytopenia  -     CBC & Differential  -     CBC Auto Differential  History of.  Will check CBC today.  Uncertain etiology.    Hypertension  -     lisinopril (PRINIVIL,ZESTRIL) 10 MG tablet; Take 1 tablet by mouth Daily.  We'll start patient on lisinopril 10 mg orally daily.  Continue to monitor.  Recheck at next visit.    Other specified hypothyroidism  -     levothyroxine (SYNTHROID) 175 MCG tablet; Take 1 tablet by mouth Daily.  Check thyroid panel 9/2017. Continue synthroid to 175 mcg orally daily.      Onychomycosis  Did not go to podiatry secondary to cost. Continue clotrimazole BID over toenails. May need oral medications, but also has a history of elevated liver enzymes. May need to utilize topical solution.       History of tobacco abuse  Pending AAA screening - patient needs to call. Patient declines Chest CT at this time.     Hyperlipidemia  Stable. Continue atorvastatin 20 mg orally daily. Next lipids 8/2017.      COPD  Stable. Continue spiriva daily (refilled)  and advair 2 puffs BID.      Elevated liver enzymes  Resolved.      Kidney insufficiency history  Resolved.        Preventative Medicine  PSA 8/2016 WNL. Declines flu and pneumo vaccines. AAA screening pending.    Findings and plans discussed with patient who verbalizes understanding and agreement. Will followup with patient once  results are in. Patient to followup at clinic PRN or in three months for medical followup.    Deloris Lewis MD

## 2017-07-12 NOTE — TELEPHONE ENCOUNTER
----- Message from Deloris Lewis MD sent at 7/12/2017  2:01 PM EDT -----  Please let patient know that his platelets are better.  Continue to monitor.      Attempted to contact patient,voice mailbox has not been set up,will attempt later.    Still unable to reach patient,letter mailed with information.

## 2017-09-11 ENCOUNTER — TELEPHONE (OUTPATIENT)
Dept: FAMILY MEDICINE CLINIC | Facility: CLINIC | Age: 77
End: 2017-09-11

## 2017-09-11 RX ORDER — LISINOPRIL 10 MG/1
10 TABLET ORAL DAILY
Qty: 30 TABLET | Refills: 2 | Status: SHIPPED | OUTPATIENT
Start: 2017-09-11 | End: 2017-10-12 | Stop reason: SDUPTHER

## 2017-09-11 RX ORDER — LEVOTHYROXINE SODIUM 175 UG/1
175 TABLET ORAL DAILY
Qty: 30 TABLET | Refills: 0 | Status: SHIPPED | OUTPATIENT
Start: 2017-09-11 | End: 2017-10-12 | Stop reason: SDUPTHER

## 2017-09-25 RX ORDER — ATORVASTATIN CALCIUM 20 MG/1
20 TABLET, FILM COATED ORAL DAILY
Qty: 30 TABLET | Refills: 0 | Status: SHIPPED | OUTPATIENT
Start: 2017-09-25 | End: 2017-10-12 | Stop reason: SDUPTHER

## 2017-09-25 NOTE — TELEPHONE ENCOUNTER
Patient presented to the clinic needing a refill on his cholesterol med,has an upcoming apt.,refilled per orders.

## 2017-10-12 ENCOUNTER — OFFICE VISIT (OUTPATIENT)
Dept: FAMILY MEDICINE CLINIC | Facility: CLINIC | Age: 77
End: 2017-10-12

## 2017-10-12 VITALS
BODY MASS INDEX: 16.17 KG/M2 | HEART RATE: 73 BPM | WEIGHT: 103 LBS | DIASTOLIC BLOOD PRESSURE: 60 MMHG | OXYGEN SATURATION: 97 % | SYSTOLIC BLOOD PRESSURE: 113 MMHG | HEIGHT: 67 IN

## 2017-10-12 DIAGNOSIS — Z87.891 HISTORY OF TOBACCO ABUSE: ICD-10-CM

## 2017-10-12 DIAGNOSIS — J43.8 OTHER EMPHYSEMA (HCC): ICD-10-CM

## 2017-10-12 DIAGNOSIS — B35.1 ONYCHOMYCOSIS: ICD-10-CM

## 2017-10-12 DIAGNOSIS — E03.8 OTHER SPECIFIED HYPOTHYROIDISM: Primary | ICD-10-CM

## 2017-10-12 DIAGNOSIS — Z87.891 HISTORY OF SMOKING 30 OR MORE PACK YEARS: ICD-10-CM

## 2017-10-12 DIAGNOSIS — Z12.5 SCREENING FOR MALIGNANT NEOPLASM OF PROSTATE: ICD-10-CM

## 2017-10-12 DIAGNOSIS — I10 ESSENTIAL HYPERTENSION: ICD-10-CM

## 2017-10-12 DIAGNOSIS — Z86.2 HISTORY OF THROMBOCYTOPENIA: ICD-10-CM

## 2017-10-12 DIAGNOSIS — E78.2 MIXED HYPERLIPIDEMIA: ICD-10-CM

## 2017-10-12 DIAGNOSIS — R35.1 NOCTURIA: ICD-10-CM

## 2017-10-12 LAB
ALBUMIN SERPL-MCNC: 4.1 G/DL (ref 3.4–4.8)
ALBUMIN/GLOB SERPL: 1.3 G/DL (ref 1.5–2.5)
ALP SERPL-CCNC: 82 U/L (ref 40–129)
ALT SERPL W P-5'-P-CCNC: 25 U/L (ref 10–44)
ANION GAP SERPL CALCULATED.3IONS-SCNC: 5.5 MMOL/L (ref 3.6–11.2)
AST SERPL-CCNC: 25 U/L (ref 10–34)
BASOPHILS # BLD AUTO: 0.03 10*3/MM3 (ref 0–0.3)
BASOPHILS NFR BLD AUTO: 0.5 % (ref 0–2)
BILIRUB SERPL-MCNC: 0.9 MG/DL (ref 0.2–1.8)
BUN BLD-MCNC: 29 MG/DL (ref 7–21)
BUN/CREAT SERPL: 21 (ref 7–25)
CALCIUM SPEC-SCNC: 9.5 MG/DL (ref 7.7–10)
CHLORIDE SERPL-SCNC: 104 MMOL/L (ref 99–112)
CO2 SERPL-SCNC: 28.5 MMOL/L (ref 24.3–31.9)
CREAT BLD-MCNC: 1.38 MG/DL (ref 0.43–1.29)
DEPRECATED RDW RBC AUTO: 48.6 FL (ref 37–54)
EOSINOPHIL # BLD AUTO: 0.17 10*3/MM3 (ref 0–0.7)
EOSINOPHIL NFR BLD AUTO: 2.7 % (ref 0–7)
ERYTHROCYTE [DISTWIDTH] IN BLOOD BY AUTOMATED COUNT: 14.4 % (ref 11.5–14.5)
GFR SERPL CREATININE-BSD FRML MDRD: 50 ML/MIN/1.73
GLOBULIN UR ELPH-MCNC: 3.1 GM/DL
GLUCOSE BLD-MCNC: 90 MG/DL (ref 70–110)
HCT VFR BLD AUTO: 39.5 % (ref 42–52)
HGB BLD-MCNC: 13 G/DL (ref 14–18)
IMM GRANULOCYTES # BLD: 0.01 10*3/MM3 (ref 0–0.03)
IMM GRANULOCYTES NFR BLD: 0.2 % (ref 0–0.5)
LYMPHOCYTES # BLD AUTO: 0.85 10*3/MM3 (ref 1–3)
LYMPHOCYTES NFR BLD AUTO: 13.6 % (ref 16–46)
MCH RBC QN AUTO: 31.9 PG (ref 27–33)
MCHC RBC AUTO-ENTMCNC: 32.9 G/DL (ref 33–37)
MCV RBC AUTO: 97.1 FL (ref 80–94)
MONOCYTES # BLD AUTO: 0.47 10*3/MM3 (ref 0.1–0.9)
MONOCYTES NFR BLD AUTO: 7.5 % (ref 0–12)
NEUTROPHILS # BLD AUTO: 4.72 10*3/MM3 (ref 1.4–6.5)
NEUTROPHILS NFR BLD AUTO: 75.5 % (ref 40–75)
OSMOLALITY SERPL CALC.SUM OF ELEC: 281 MOSM/KG (ref 273–305)
PLATELET # BLD AUTO: 154 10*3/MM3 (ref 130–400)
PMV BLD AUTO: 11.7 FL (ref 6–10)
POTASSIUM BLD-SCNC: 4.2 MMOL/L (ref 3.5–5.3)
PROT SERPL-MCNC: 7.2 G/DL (ref 6–8)
PSA SERPL-MCNC: 0.79 NG/ML (ref 0–4)
RBC # BLD AUTO: 4.07 10*6/MM3 (ref 4.7–6.1)
SODIUM BLD-SCNC: 138 MMOL/L (ref 135–153)
T4 FREE SERPL-MCNC: 1.96 NG/DL (ref 0.89–1.76)
TSH SERPL DL<=0.05 MIU/L-ACNC: 0.01 MIU/ML (ref 0.55–4.78)
WBC NRBC COR # BLD: 6.25 10*3/MM3 (ref 4.5–12.5)

## 2017-10-12 PROCEDURE — 85025 COMPLETE CBC W/AUTO DIFF WBC: CPT | Performed by: FAMILY MEDICINE

## 2017-10-12 PROCEDURE — 84153 ASSAY OF PSA TOTAL: CPT | Performed by: FAMILY MEDICINE

## 2017-10-12 PROCEDURE — G0008 ADMIN INFLUENZA VIRUS VAC: HCPCS | Performed by: FAMILY MEDICINE

## 2017-10-12 PROCEDURE — 90662 IIV NO PRSV INCREASED AG IM: CPT | Performed by: FAMILY MEDICINE

## 2017-10-12 PROCEDURE — 36415 COLL VENOUS BLD VENIPUNCTURE: CPT | Performed by: FAMILY MEDICINE

## 2017-10-12 PROCEDURE — 84439 ASSAY OF FREE THYROXINE: CPT | Performed by: FAMILY MEDICINE

## 2017-10-12 PROCEDURE — 84443 ASSAY THYROID STIM HORMONE: CPT | Performed by: FAMILY MEDICINE

## 2017-10-12 PROCEDURE — 80053 COMPREHEN METABOLIC PANEL: CPT | Performed by: FAMILY MEDICINE

## 2017-10-12 PROCEDURE — 99214 OFFICE O/P EST MOD 30 MIN: CPT | Performed by: FAMILY MEDICINE

## 2017-10-12 RX ORDER — ATORVASTATIN CALCIUM 20 MG/1
20 TABLET, FILM COATED ORAL DAILY
Qty: 30 TABLET | Refills: 5 | Status: SHIPPED | OUTPATIENT
Start: 2017-10-12 | End: 2018-06-01 | Stop reason: SDUPTHER

## 2017-10-12 RX ORDER — LISINOPRIL 10 MG/1
10 TABLET ORAL DAILY
Qty: 30 TABLET | Refills: 5 | Status: SHIPPED | OUTPATIENT
Start: 2017-10-12 | End: 2017-12-19 | Stop reason: SDUPTHER

## 2017-10-12 RX ORDER — LEVOTHYROXINE SODIUM 175 UG/1
175 TABLET ORAL DAILY
Qty: 30 TABLET | Refills: 5 | Status: SHIPPED | OUTPATIENT
Start: 2017-10-12 | End: 2017-11-21

## 2017-10-27 ENCOUNTER — HOSPITAL ENCOUNTER (OUTPATIENT)
Dept: CT IMAGING | Facility: HOSPITAL | Age: 77
Discharge: HOME | End: 2017-10-27
Admitting: FAMILY MEDICINE

## 2017-10-27 PROCEDURE — G0297 LDCT FOR LUNG CA SCREEN: HCPCS

## 2017-10-27 PROCEDURE — 71250 CT THORAX DX C-: CPT | Performed by: RADIOLOGY

## 2017-11-01 NOTE — PROGRESS NOTES
"Onel MONICA Georgina     VITALS: Blood pressure 113/60, pulse 73, height 67\" (170.2 cm), weight 103 lb (46.7 kg), SpO2 97 %.    Subjective  Chief Complaint:   Chief Complaint   Patient presents with   • Follow-up        History of Present Illness:  Patient is a 77 y.o. male with a medical history significant for CAD and COPD who presents to clinic secondary to medical followup. No new or acute concerns.  He is doing well. He continues to lose weight, but he states that he is feeling fine. He \"reckons\" that he just isn't eating enough, but he denies any symptoms.     Patient also has a history of hyperlipidemia and is currently on atorvastatin 20 mg orally daily. Denies any side effects of the medications. Last lipid panel in 8/2016 and was ok. Denies any muscle weakness, jaundice or itching.   Low cholesterol diet: Yes    Patient does have a history of hypothyroidism and is currently on synthroid 175 mcg orally daily. He is doing well on this medication. Denies any side effects. Denies fatigue, dizziness, palpitations, changes in weight, hair, or nails. Last thyroid panel was in 6/2017 and was abnormal.     Patient has a history of COPD and is currently on Advair 250/50 1 puff twice daily and Spiriva daily. Denies any side effects of the medications. Occasional shortness of breath, but no other coughing, wheezing, or night coughing. Reports that in the past CXR has shown \"fungus spots on his lungs\".  Exacerbation: No  Last utilized albuterol: Does not have albuterol    Patient has a history of CAD. He sees Dr. Harley, and is currently on Aspirin 81 mg daily. He does have an extensive history, including a cath, 5 bypasses, along with a history of PAD with stents in both legs along with a left arterial bypass in his lower extremity. In the past, he was on plavix, digoxin, imdur, and lasix, but Dr. Harley has since taken him off of these medications as he has been extremely stable in the past. Last saw Dr." Cherri some months ago. He quit smoking in 2012.  History of stroke: Yes    No complaints about any of the medications.    The following portions of the patient's history were reviewed and updated as appropriate: allergies, current medications, past family history, past medical history, past social history, past surgical history and problem list.    Past Medical History  Past Medical History:   Diagnosis Date   • Arthritis    • CAD (coronary artery disease)     Sees Dr. Harley   • COPD (chronic obstructive pulmonary disease)    • Former smoker     Stopped in 2012   • Hernia of abdominal wall    • History of TIAs    • Hyperlipidemia    • Hypertension    • Hypothyroidism    • Inguinal hernia    • PAD (peripheral artery disease)    • Ruptured lumbar disc    • Scrotal hernia     Left   • Stroke 2011    10 YEARS AGO   • Ventral hernia        Review of Systems  Constitutional: Denies any recent history of HAs, dizziness, fevers, chills, itching.  Eyes: Denies any changes in vision. Denies any blurry vision or diplopia.  Ears, Nose, Mouth, Throat: Denies any sore throat, rhinorrhea, or cough.  Cardiovascular: Denies any chest pain, pressure, or palpitations.  Respiratory: Denies any shortness of breath or wheezing.  Gastrointestinal: Denies any abdominal pain, nausea, vomiting, diarrhea, or constipation.  Genitourinary: Denies any changes in urination.  Musculoskeletal: Denies any muscle weakness.  Skin and/or breasts: Denies any rashes.  Neurological: Denies any changes in balance or gait.  Psychiatric: Denies any anxiety, depression, or insomnia. Denies any suicidal or homicidal ideations.      Surgical History  Past Surgical History:   Procedure Laterality Date   • AMPUTATION  06/2012    left great toe amputation   • ARTERIAL BYPASS SURGERY  10/2012    left leg - groin to ankle; Dr. Browne; [facility].   • BACK SURGERY      age 30; spinal fusion   • CARDIAC SURGERY  08/2012    Open heart bypass;  [facility]   • CAROTID STENT Bilateral    • CATARACT EXTRACTION  03/07/2016   • CORONARY ARTERY BYPASS GRAFT  2012    x5   • HERNIA REPAIR      age 35; inguinal hernia   • INGUINAL HERNIA REPAIR Left 9/15/2016    Procedure: INGUINAL HERNIA REPAIR;  Surgeon: Henry Bullock MD;  Location:  [facility];  Service:    • INGUINAL HERNIA REPAIR Left 11/18/2016    Procedure: INGUINAL HERNIA REPAIR;  Surgeon: Henry Bullock MD;  Location:  [facility];  Service:    • LUNG BIOPSY  2012   • VENTRAL/INCISIONAL HERNIA REPAIR N/A 9/15/2016    Procedure: VENTRAL/INCISIONAL HERNIA REPAIR;  Surgeon: Henry Bullock MD;  Location:  [facility];  Service:        Family History  Family History   Problem Relation Age of Onset   • Alzheimer's disease Mother    • Heart disease Father        Social History  Social History     Social History   • Marital status: Unknown     Spouse name: N/A   • Number of children: N/A   • Years of education: N/A     Occupational History   • Not on file.     Social History Main Topics   • Smoking status: Former Smoker     Packs/day: 1.00     Years: 30.00     Types: Cigarettes     Quit date: 9/14/2006   • Smokeless tobacco: Never Used   • Alcohol use No   • Drug use: No   • Sexual activity: Defer     Other Topics Concern   • Not on file     Social History Narrative       Objective  Physical Exam  Gen: Patient in NAD. Pleasant and answers appropriately. A&Ox3.    Skin: Warm and dry with normal turgor. No purpura, rashes, or unusual pigmentation noted. Hair is normal in appearance and distribution.    HEENT: NC/AT. No lesions noted. Conjunctiva clear, sclera nonicteric. PERRL. EOMI without nystagmus or strabismus. Fundi appear benign. No hemorrhages or exudates of eyes. Auditory canals are patent bilaterally without lesions. TMs intact,  nonerythematous, nonbulging without lesions. Nasal mucosa pink, nonerythematous, and nonedematous. Frontal and maxillary sinuses are nontender. O/P nonerythematous and moist  without exudate.    Neck: Supple without lymph nodes palpated. FROM.     Lungs: CTA B/L without rales, rhonchi, crackles, or wheezes.    Heart: RRR. S1 and S2 normal. No S3 or S4. No MRGT.    Abd: Soft, nontender,nondistended. (+)BSx4 quadrants.     Extrem: No CCE. Radial pulses 2+/4 and equal B/L. FROMx4. No bone, joint, or muscle tenderness noted.    Neuro: No focal motor/sensory deficits.    Procedures    Assessment/Plan  Onel Erickson is a 77 y.o. here for medical followup.  Diagnoses and all orders for this visit:    Other specified hypothyroidism  -     Comprehensive Metabolic Panel  -     T4, Free  -     TSH  -     Osmolality, Calculated; Future  -     Osmolality, Calculated  Check thyroid panel today. Continue synthroid 175 mcg orally daily.    History of thrombocytopenia  -     CBC & Differential  -     CBC Auto Differential  History of.  Will check CBC today.  Uncertain etiology.     Hypertension  Improved. Continue lisinopril 10 mg orally daily.  Continue to monitor.  Recheck at next visit.       Onychomycosis  Did not go to podiatry secondary to cost. Continue clotrimazole BID over toenails. May need oral medications, but also has a history of elevated liver enzymes. May need to utilize topical solution.       History of tobacco abuse  Will send for AAA screening and Chest CT.     Hyperlipidemia  Stable. Continue atorvastatin 20 mg orally daily. Next lipids when fasting.      COPD  Stable. Continue spiriva daily (refilled) and advair 2 puffs BID.      Elevated liver enzymes  Resolved.      Kidney insufficiency history  Resolved.      Preventative Medicine  PSA 8/2016 WNL - will check today. Declines pneumo vaccines. AAA screening and CT scan chest pending.Flu shot given today.    Findings and plans discussed with patient who verbalizes understanding and agreement. Will followup with patient once results are in. Patient to followup at clinic PRN or in three months for medical followup.    Screening  for malignant neoplasm of prostate  -     PSA    Nocturia   -     PSA    History of smoking 30 or more pack years  -     CT Chest Low Dose Wo    Other orders  Refilled:  -     atorvastatin (LIPITOR) 20 MG tablet; Take 1 tablet by mouth Daily.  -     fluticasone-salmeterol (ADVAIR DISKUS) 250-50 MCG/DOSE DISKUS; Inhale 1 puff 2 (Two) Times a Day.  -     levothyroxine (SYNTHROID) 175 MCG tablet; Take 1 tablet by mouth Daily.  -     lisinopril (PRINIVIL,ZESTRIL) 10 MG tablet; Take 1 tablet by mouth Daily.  -     tiotropium (SPIRIVA) 18 MCG per inhalation capsule; Place 1 capsule into inhaler and inhale Daily.  -     Flu Vaccine High Dose PF 65YR+ ([id number])    Deloris Lewis MD

## 2017-11-08 ENCOUNTER — TELEPHONE (OUTPATIENT)
Dept: FAMILY MEDICINE CLINIC | Facility: CLINIC | Age: 77
End: 2017-11-08

## 2017-11-08 NOTE — TELEPHONE ENCOUNTER
----- Message from Deloris Lewis MD sent at 11/7/2017  8:52 PM EST -----  Please send him a letter letting him know that he needs to come and make an appointment. There are some lab abnormalities I need to discuss with him. He does not have a phone I think. Please let him know in the letter to drink plenty of water. Thanks.        Letter mailed.

## 2017-11-21 ENCOUNTER — OFFICE VISIT (OUTPATIENT)
Dept: FAMILY MEDICINE CLINIC | Facility: CLINIC | Age: 77
End: 2017-11-21

## 2017-11-21 VITALS
BODY MASS INDEX: 15.19 KG/M2 | SYSTOLIC BLOOD PRESSURE: 115 MMHG | DIASTOLIC BLOOD PRESSURE: 80 MMHG | HEART RATE: 80 BPM | WEIGHT: 96.8 LBS | HEIGHT: 67 IN

## 2017-11-21 DIAGNOSIS — J18.9 PNEUMONIA OF LOWER LOBE DUE TO INFECTIOUS ORGANISM, UNSPECIFIED LATERALITY: ICD-10-CM

## 2017-11-21 DIAGNOSIS — R91.8 LUNG NODULES: ICD-10-CM

## 2017-11-21 DIAGNOSIS — H61.22 LEFT EAR IMPACTED CERUMEN: Primary | ICD-10-CM

## 2017-11-21 DIAGNOSIS — E03.8 OTHER SPECIFIED HYPOTHYROIDISM: ICD-10-CM

## 2017-11-21 DIAGNOSIS — D63.8 ANEMIA IN OTHER CHRONIC DISEASES CLASSIFIED ELSEWHERE: ICD-10-CM

## 2017-11-21 DIAGNOSIS — N28.9 RENAL INSUFFICIENCY: ICD-10-CM

## 2017-11-21 LAB
ALBUMIN SERPL-MCNC: 4.4 G/DL (ref 3.4–4.8)
ALBUMIN/GLOB SERPL: 1.4 G/DL (ref 1.5–2.5)
ALP SERPL-CCNC: 96 U/L (ref 40–129)
ALT SERPL W P-5'-P-CCNC: 21 U/L (ref 10–44)
ANION GAP SERPL CALCULATED.3IONS-SCNC: 4.3 MMOL/L (ref 3.6–11.2)
AST SERPL-CCNC: 23 U/L (ref 10–34)
BASOPHILS # BLD AUTO: 0.03 10*3/MM3 (ref 0–0.3)
BASOPHILS NFR BLD AUTO: 0.2 % (ref 0–2)
BILIRUB SERPL-MCNC: 0.7 MG/DL (ref 0.2–1.8)
BUN BLD-MCNC: 70 MG/DL (ref 7–21)
BUN/CREAT SERPL: 32.6 (ref 7–25)
CALCIUM SPEC-SCNC: 9.7 MG/DL (ref 7.7–10)
CHLORIDE SERPL-SCNC: 108 MMOL/L (ref 99–112)
CO2 SERPL-SCNC: 25.7 MMOL/L (ref 24.3–31.9)
CREAT BLD-MCNC: 2.15 MG/DL (ref 0.43–1.29)
DEPRECATED RDW RBC AUTO: 45.9 FL (ref 37–54)
EOSINOPHIL # BLD AUTO: 0.14 10*3/MM3 (ref 0–0.7)
EOSINOPHIL NFR BLD AUTO: 1.1 % (ref 0–7)
ERYTHROCYTE [DISTWIDTH] IN BLOOD BY AUTOMATED COUNT: 13.4 % (ref 11.5–14.5)
FERRITIN SERPL-MCNC: 617 NG/ML (ref 21.9–321.7)
GFR SERPL CREATININE-BSD FRML MDRD: 30 ML/MIN/1.73
GLOBULIN UR ELPH-MCNC: 3.2 GM/DL
GLUCOSE BLD-MCNC: 93 MG/DL (ref 70–110)
HCT VFR BLD AUTO: 39.7 % (ref 42–52)
HGB BLD-MCNC: 13.3 G/DL (ref 14–18)
IMM GRANULOCYTES # BLD: 0.02 10*3/MM3 (ref 0–0.03)
IMM GRANULOCYTES NFR BLD: 0.2 % (ref 0–0.5)
IRON 24H UR-MRATE: 82 MCG/DL (ref 53–167)
IRON SATN MFR SERPL: 33 % (ref 20–50)
LYMPHOCYTES # BLD AUTO: 1.07 10*3/MM3 (ref 1–3)
LYMPHOCYTES NFR BLD AUTO: 8.4 % (ref 16–46)
MCH RBC QN AUTO: 32.5 PG (ref 27–33)
MCHC RBC AUTO-ENTMCNC: 33.5 G/DL (ref 33–37)
MCV RBC AUTO: 97.1 FL (ref 80–94)
MONOCYTES # BLD AUTO: 0.87 10*3/MM3 (ref 0.1–0.9)
MONOCYTES NFR BLD AUTO: 6.8 % (ref 0–12)
NEUTROPHILS # BLD AUTO: 10.68 10*3/MM3 (ref 1.4–6.5)
NEUTROPHILS NFR BLD AUTO: 83.3 % (ref 40–75)
OSMOLALITY SERPL CALC.SUM OF ELEC: 295.8 MOSM/KG (ref 273–305)
PLATELET # BLD AUTO: 197 10*3/MM3 (ref 130–400)
PMV BLD AUTO: 11.8 FL (ref 6–10)
POTASSIUM BLD-SCNC: 5.2 MMOL/L (ref 3.5–5.3)
PROT SERPL-MCNC: 7.6 G/DL (ref 6–8)
RBC # BLD AUTO: 4.09 10*6/MM3 (ref 4.7–6.1)
SODIUM BLD-SCNC: 138 MMOL/L (ref 135–153)
TIBC SERPL-MCNC: 249 MCG/DL (ref 241–421)
WBC NRBC COR # BLD: 12.81 10*3/MM3 (ref 4.5–12.5)

## 2017-11-21 PROCEDURE — 85025 COMPLETE CBC W/AUTO DIFF WBC: CPT | Performed by: FAMILY MEDICINE

## 2017-11-21 PROCEDURE — 83550 IRON BINDING TEST: CPT | Performed by: FAMILY MEDICINE

## 2017-11-21 PROCEDURE — 99214 OFFICE O/P EST MOD 30 MIN: CPT | Performed by: FAMILY MEDICINE

## 2017-11-21 PROCEDURE — 83540 ASSAY OF IRON: CPT | Performed by: FAMILY MEDICINE

## 2017-11-21 PROCEDURE — 80053 COMPREHEN METABOLIC PANEL: CPT | Performed by: FAMILY MEDICINE

## 2017-11-21 PROCEDURE — 85060 BLOOD SMEAR INTERPRETATION: CPT | Performed by: FAMILY MEDICINE

## 2017-11-21 PROCEDURE — 82728 ASSAY OF FERRITIN: CPT | Performed by: FAMILY MEDICINE

## 2017-11-21 PROCEDURE — 69209 REMOVE IMPACTED EAR WAX UNI: CPT | Performed by: FAMILY MEDICINE

## 2017-11-21 RX ORDER — AZITHROMYCIN 250 MG/1
TABLET, FILM COATED ORAL
Qty: 6 TABLET | Refills: 0 | Status: SHIPPED | OUTPATIENT
Start: 2017-11-21 | End: 2018-05-31

## 2017-11-21 RX ORDER — LEVOTHYROXINE SODIUM 0.15 MG/1
150 TABLET ORAL DAILY
Qty: 30 TABLET | Refills: 2 | Status: SHIPPED | OUTPATIENT
Start: 2017-11-21 | End: 2017-12-19 | Stop reason: SDUPTHER

## 2017-11-22 ENCOUNTER — TELEPHONE (OUTPATIENT)
Dept: FAMILY MEDICINE CLINIC | Facility: CLINIC | Age: 77
End: 2017-11-22

## 2017-11-22 NOTE — TELEPHONE ENCOUNTER
----- Message from Deloris Lewis MD sent at 11/21/2017  6:59 PM EST -----  So his creatinine is worse and he did tell me today that he was drinking water. His kidneys are failing. I'd like to send him to nephro as soon as we can, if he's okay with it.    Can you mail him a letter that says his labs indicate that he is having problems with his kidneys and that he needs to come by the office? And then, as he still doesn't have a phone, can you try reaching Keila (DIL) - her name is under the HIPAA thing and have him call here or have him go over to their place and use their phone and try to reach him in person. I'm afraid of his kidneys worsening by the time he gets that letter. We just need to send him to nephro asap. Thanks.        Letter mailed to come to the office,attempted to contact by calling all Listed numbers on his HIPPA, [phone] spoke with someone who did not know Keila Araujo wrong number,called [phone] voice mailbox not set up,called [phone] Daysi Quintero left a message to please call the office.      Daysi returned call reports she will get a hold of patients wife & have her call us.    Attempted to contact Daysi again,no answer.    He came into the office today & is agreeable to referral,place referral,[facility] has already scheduled apt. & he has been notified.Encouraged to drink extra water.

## 2017-11-27 LAB
CYTOLOGIST CVX/VAG CYTO: NORMAL
PATH INTERP BLD-IMP: NORMAL

## 2017-11-28 DIAGNOSIS — N28.9 KIDNEY DISEASE: Primary | ICD-10-CM

## 2017-11-29 DIAGNOSIS — R91.1 PULMONARY NODULE SEEN ON IMAGING STUDY: Primary | ICD-10-CM

## 2017-11-29 NOTE — PROGRESS NOTES
Patient was sent for a low dose CT chest scan which shows a solid 8.7 mm nodule in image# 31. This warrants a PET scan to further delineate the mass. Will set PET scan up.

## 2017-12-01 ENCOUNTER — APPOINTMENT (OUTPATIENT)
Dept: PET IMAGING | Facility: HOSPITAL | Age: 77
End: 2017-12-01

## 2017-12-06 NOTE — PROGRESS NOTES
"Onel MONICA Georgina     VITALS: Blood pressure 115/80, pulse 80, height 170.2 cm (67\"), weight 43.9 kg (96 lb 12.8 oz).    Subjective  Chief Complaint:   Chief Complaint   Patient presents with   • Follow-up     labs         History of Present Illness:  Patient is a 77 y.o.  male who presents to clinic secondary to medical followup.    Patient was seen today for these conditions and concerns:  Patient comes in today for several reasons:  1) To go over recent labs. Labs showed renal insufficiency along with a CT that showed lung nodules. Thyroid dosage is also too high. CBC also showed anemia  2) Patient is not feeling well. He states that he is having a four day history of yellow phlegm, coughing, rhinorrhea, shortness of breath. No fevers, chills, ear pain, sinus tenderness.    No complaints about any of the medications.    The following portions of the patient's history were reviewed and updated as appropriate: allergies, current medications, past family history, past medical history, past social history, past surgical history and problem list.    Past Medical History  Past Medical History:   Diagnosis Date   • Arthritis    • CAD (coronary artery disease)     Sees Dr. Harley   • COPD (chronic obstructive pulmonary disease)    • Former smoker     Stopped in 2012   • Hernia of abdominal wall    • History of TIAs    • Hyperlipidemia    • Hypertension    • Hypothyroidism    • Inguinal hernia    • PAD (peripheral artery disease)    • Ruptured lumbar disc    • Scrotal hernia     Left   • Stroke 2011    10 YEARS AGO   • Ventral hernia        Review of Systems  Constitutional: Denies any recent history of HAs, dizziness, fevers, chills, itching.  Eyes: Denies any changes in vision. Denies any blurry vision or diplopia.  Ears, Nose, Mouth, Throat: Denies any sore throat.  Cardiovascular: Denies any chest pain, pressure, or palpitations.  Respiratory: Denies any wheezing.  Gastrointestinal: Denies any abdominal pain, " nausea, vomiting, diarrhea, or constipation.  Genitourinary: Denies any changes in urination.  Musculoskeletal: Denies any muscle weakness.  Skin and/or breasts: Denies any rashes.  Neurological: Denies any changes in balance or gait.  Psychiatric: Denies any anxiety, depression, or insomnia. Denies any suicidal or homicidal ideations.    Surgical History  Past Surgical History:   Procedure Laterality Date   • AMPUTATION  06/2012    left great toe amputation   • ARTERIAL BYPASS SURGERY  10/2012    left leg - groin to ankle; Dr. Browne; [facility].   • BACK SURGERY      age 30; spinal fusion   • CARDIAC SURGERY  08/2012    Open heart bypass; [facility]   • CAROTID STENT Bilateral    • CATARACT EXTRACTION  03/07/2016   • CORONARY ARTERY BYPASS GRAFT  2012    x5   • HERNIA REPAIR      age 35; inguinal hernia   • INGUINAL HERNIA REPAIR Left 9/15/2016    Procedure: INGUINAL HERNIA REPAIR;  Surgeon: Henry Bullock MD;  Location: [facility];  Service:    • INGUINAL HERNIA REPAIR Left 11/18/2016    Procedure: INGUINAL HERNIA REPAIR;  Surgeon: Henry Bullock MD;  Location: [facility];  Service:    • LUNG BIOPSY  2012   • VENTRAL/INCISIONAL HERNIA REPAIR N/A 9/15/2016    Procedure: VENTRAL/INCISIONAL HERNIA REPAIR;  Surgeon: Henry Bullock MD;  Location: [facility];  Service:        Family History  Family History   Problem Relation Age of Onset   • Alzheimer's disease Mother    • Heart disease Father        Social History  Social History     Social History   • Marital status: Unknown     Spouse name: N/A   • Number of children: N/A   • Years of education: N/A     Occupational History   • Not on file.     Social History Main Topics   • Smoking status: Former Smoker     Packs/day: 1.00     Years: 30.00     Types: Cigarettes     Quit date: 9/14/2006   • Smokeless tobacco: Never Used   • Alcohol use No   • Drug use: No   • Sexual activity: Defer     Other Topics Concern   • Not on file     Social History  "Narrative       Objective  Physical Exam  Gen: Patient in NAD. Pleasant and answers appropriately. A&Ox3.    Skin: Warm and dry with normal turgor. No purpura, rashes, or unusual pigmentation noted. Hair is normal in appearance and distribution.    HEENT: NC/AT. No lesions noted. Conjunctiva clear, sclera nonicteric. PERRL. EOMI without nystagmus or strabismus. Fundi appear benign. No hemorrhages or exudates of eyes. Auditory canals are patent bilaterally without lesions. TMs intact,  nonerythematous, nonbulging without lesions. Nasal mucosa erythematous, and nonedematous. Frontal and maxillary sinuses are nontender. O/P erythematous and moist without exudate.    Neck: Supple without lymph nodes palpated. FROM.     Lungs: Decreased B/L without rales, rhonchi, crackles, or wheezes.    Heart: RRR. S1 and S2 normal. No S3 or S4. No MRGT.    Abd: Soft, nontender,nondistended. (+)BSx4 quadrants.     Extrem: No CCE. Radial pulses 2+/4 and equal B/L. FROMx4. No bone, joint, or muscle tenderness noted.    Neuro: No focal motor/sensory deficits.    Ear Cerumen Removal Lavage  Date/Time: 11/21/2017 10:30 AM  Performed by: ANA LUISA WILKINSON  Authorized by: ANA LUISA WILKINSON   Consent: Verbal consent obtained. Written consent not obtained.  Risks and benefits: risks, benefits and alternatives were discussed  Consent given by: patient  Patient understanding: patient states understanding of the procedure being performed  Patient consent: the patient's understanding of the procedure matches consent given  Procedure consent: procedure consent matches procedure scheduled  Relevant documents: relevant documents present and verified  Patient identity confirmed: verbally with patient  Time out: Immediately prior to procedure a \"time out\" was called to verify the correct patient, procedure, equipment, support staff and site/side marked as required.    Anesthesia:  Local Anesthetic: none  Location details: right ear and left " ear  Procedure type: irrigation    Sedation:  Patient sedated: no  Patient tolerance: Patient tolerated the procedure well with no immediate complications          Assessment/Plan  Onel Erickson is a 77 y.o. here for medical followup.  Diagnoses and all orders for this visit:    Left ear impacted cerumen  -     Ear Cerumen Removal Lavage    Pneumonia of lower lobe due to infectious organism, unspecified laterality  -     azithromycin (ZITHROMAX Z-PABLO) 250 MG tablet; Take 2 tablets the first day, then 1 tablet daily for 4 days.    Other specified hypothyroidism  -     levothyroxine (SYNTHROID) 150 MCG tablet; Take 1 tablet by mouth Daily.    Renal insufficiency  -     Comprehensive Metabolic Panel  -     Osmolality, Calculated; Future  -     Osmolality, Calculated    Anemia in other chronic diseases classified elsewhere  -     CBC & Differential  -     Peripheral Blood Smear - ,  -     Ferritin  -     Iron Profile  -     CBC Auto Differential    Lung nodules  -     NM Pet Skull Base To Mid Thigh    Findings and plans discussed with patient who verbalizes understanding and agreement. Will followup with patient once results are in. Patient to followup at clinic PRN or in one month for further medical followup.    Deloris Lewis MD

## 2017-12-19 ENCOUNTER — OFFICE VISIT (OUTPATIENT)
Dept: FAMILY MEDICINE CLINIC | Facility: CLINIC | Age: 77
End: 2017-12-19

## 2017-12-19 VITALS
BODY MASS INDEX: 16.17 KG/M2 | SYSTOLIC BLOOD PRESSURE: 121 MMHG | DIASTOLIC BLOOD PRESSURE: 81 MMHG | HEART RATE: 97 BPM | HEIGHT: 67 IN | WEIGHT: 103 LBS | OXYGEN SATURATION: 97 %

## 2017-12-19 DIAGNOSIS — N28.9 RENAL INSUFFICIENCY: Primary | ICD-10-CM

## 2017-12-19 DIAGNOSIS — E03.8 OTHER SPECIFIED HYPOTHYROIDISM: ICD-10-CM

## 2017-12-19 DIAGNOSIS — I10 ESSENTIAL HYPERTENSION: ICD-10-CM

## 2017-12-19 LAB
ALBUMIN SERPL-MCNC: 4.2 G/DL (ref 3.4–4.8)
ALBUMIN/GLOB SERPL: 1.4 G/DL (ref 1.5–2.5)
ALP SERPL-CCNC: 86 U/L (ref 40–129)
ALT SERPL W P-5'-P-CCNC: 36 U/L (ref 10–44)
ANION GAP SERPL CALCULATED.3IONS-SCNC: 7.2 MMOL/L (ref 3.6–11.2)
AST SERPL-CCNC: 32 U/L (ref 10–34)
BILIRUB SERPL-MCNC: 0.7 MG/DL (ref 0.2–1.8)
BUN BLD-MCNC: 28 MG/DL (ref 7–21)
BUN/CREAT SERPL: 23.7 (ref 7–25)
CALCIUM SPEC-SCNC: 9.6 MG/DL (ref 7.7–10)
CHLORIDE SERPL-SCNC: 105 MMOL/L (ref 99–112)
CO2 SERPL-SCNC: 26.8 MMOL/L (ref 24.3–31.9)
CREAT BLD-MCNC: 1.18 MG/DL (ref 0.43–1.29)
GFR SERPL CREATININE-BSD FRML MDRD: 60 ML/MIN/1.73
GLOBULIN UR ELPH-MCNC: 2.9 GM/DL
GLUCOSE BLD-MCNC: 87 MG/DL (ref 70–110)
OSMOLALITY SERPL CALC.SUM OF ELEC: 282.4 MOSM/KG (ref 273–305)
POTASSIUM BLD-SCNC: 4.2 MMOL/L (ref 3.5–5.3)
PROT SERPL-MCNC: 7.1 G/DL (ref 6–8)
SODIUM BLD-SCNC: 139 MMOL/L (ref 135–153)

## 2017-12-19 PROCEDURE — 80053 COMPREHEN METABOLIC PANEL: CPT | Performed by: FAMILY MEDICINE

## 2017-12-19 PROCEDURE — 99214 OFFICE O/P EST MOD 30 MIN: CPT | Performed by: FAMILY MEDICINE

## 2017-12-19 RX ORDER — LEVOTHYROXINE SODIUM 0.15 MG/1
150 TABLET ORAL DAILY
Qty: 30 TABLET | Refills: 2 | Status: SHIPPED | OUTPATIENT
Start: 2017-12-19 | End: 2018-06-01 | Stop reason: SDUPTHER

## 2017-12-19 RX ORDER — LISINOPRIL 10 MG/1
10 TABLET ORAL DAILY
Qty: 30 TABLET | Refills: 5 | Status: SHIPPED | OUTPATIENT
Start: 2017-12-19 | End: 2018-10-24 | Stop reason: SDUPTHER

## 2018-01-01 ENCOUNTER — OFFICE VISIT (OUTPATIENT)
Dept: FAMILY MEDICINE CLINIC | Facility: CLINIC | Age: 78
End: 2018-01-01

## 2018-01-01 VITALS
DIASTOLIC BLOOD PRESSURE: 61 MMHG | HEIGHT: 66 IN | SYSTOLIC BLOOD PRESSURE: 95 MMHG | BODY MASS INDEX: 16.07 KG/M2 | WEIGHT: 100 LBS | HEART RATE: 70 BPM

## 2018-01-01 DIAGNOSIS — R94.6 THYROID FUNCTION TEST ABNORMAL: ICD-10-CM

## 2018-01-01 DIAGNOSIS — R05.9 COUGH: Primary | ICD-10-CM

## 2018-01-01 DIAGNOSIS — N28.9 RENAL INSUFFICIENCY: ICD-10-CM

## 2018-01-01 DIAGNOSIS — Z87.898 HISTORY OF MULTIPLE PULMONARY NODULES: ICD-10-CM

## 2018-01-01 LAB
ALBUMIN SERPL-MCNC: 4 G/DL (ref 3.4–4.8)
ALBUMIN/GLOB SERPL: 1.4 G/DL (ref 1.5–2.5)
ALP SERPL-CCNC: 75 U/L (ref 40–129)
ALT SERPL W P-5'-P-CCNC: 21 U/L (ref 10–44)
ANION GAP SERPL CALCULATED.3IONS-SCNC: 5.9 MMOL/L (ref 3.6–11.2)
AST SERPL-CCNC: 26 U/L (ref 10–34)
BASOPHILS # BLD AUTO: 0.06 10*3/MM3 (ref 0–0.3)
BASOPHILS NFR BLD AUTO: 0.9 % (ref 0–2)
BILIRUB SERPL-MCNC: 0.4 MG/DL (ref 0.2–1.8)
BUN BLD-MCNC: 38 MG/DL (ref 7–21)
BUN/CREAT SERPL: 20 (ref 7–25)
CALCIUM SPEC-SCNC: 9.1 MG/DL (ref 7.7–10)
CHLORIDE SERPL-SCNC: 102 MMOL/L (ref 99–112)
CO2 SERPL-SCNC: 29.1 MMOL/L (ref 24.3–31.9)
CREAT BLD-MCNC: 1.9 MG/DL (ref 0.43–1.29)
DEPRECATED RDW RBC AUTO: 56.9 FL (ref 37–54)
EOSINOPHIL # BLD AUTO: 0.36 10*3/MM3 (ref 0–0.7)
EOSINOPHIL NFR BLD AUTO: 5.4 % (ref 0–7)
ERYTHROCYTE [DISTWIDTH] IN BLOOD BY AUTOMATED COUNT: 16 % (ref 11.5–14.5)
GFR SERPL CREATININE-BSD FRML MDRD: 34 ML/MIN/1.73
GLOBULIN UR ELPH-MCNC: 2.8 GM/DL
GLUCOSE BLD-MCNC: 79 MG/DL (ref 70–110)
HCT VFR BLD AUTO: 38.9 % (ref 42–52)
HGB BLD-MCNC: 12.5 G/DL (ref 14–18)
IMM GRANULOCYTES # BLD AUTO: 0.02 10*3/MM3 (ref 0–0.03)
IMM GRANULOCYTES NFR BLD AUTO: 0.3 % (ref 0–0.5)
LYMPHOCYTES # BLD AUTO: 0.86 10*3/MM3 (ref 1–3)
LYMPHOCYTES NFR BLD AUTO: 12.9 % (ref 16–46)
MCH RBC QN AUTO: 31 PG (ref 27–33)
MCHC RBC AUTO-ENTMCNC: 32.1 G/DL (ref 33–37)
MCV RBC AUTO: 96.5 FL (ref 80–94)
MONOCYTES # BLD AUTO: 0.64 10*3/MM3 (ref 0.1–0.9)
MONOCYTES NFR BLD AUTO: 9.6 % (ref 0–12)
NEUTROPHILS # BLD AUTO: 4.73 10*3/MM3 (ref 1.4–6.5)
NEUTROPHILS NFR BLD AUTO: 70.9 % (ref 40–75)
OSMOLALITY SERPL CALC.SUM OF ELEC: 281.8 MOSM/KG (ref 273–305)
PLATELET # BLD AUTO: 151 10*3/MM3 (ref 130–400)
PMV BLD AUTO: 11.4 FL (ref 6–10)
POTASSIUM BLD-SCNC: 4.5 MMOL/L (ref 3.5–5.3)
PROT SERPL-MCNC: 6.8 G/DL (ref 6–8)
RBC # BLD AUTO: 4.03 10*6/MM3 (ref 4.7–6.1)
SODIUM BLD-SCNC: 137 MMOL/L (ref 135–153)
WBC NRBC COR # BLD: 6.67 10*3/MM3 (ref 4.5–12.5)

## 2018-01-01 PROCEDURE — 99214 OFFICE O/P EST MOD 30 MIN: CPT | Performed by: FAMILY MEDICINE

## 2018-01-01 PROCEDURE — 80053 COMPREHEN METABOLIC PANEL: CPT | Performed by: FAMILY MEDICINE

## 2018-01-01 PROCEDURE — 85025 COMPLETE CBC W/AUTO DIFF WBC: CPT | Performed by: FAMILY MEDICINE

## 2018-01-03 ENCOUNTER — TELEPHONE (OUTPATIENT)
Dept: FAMILY MEDICINE CLINIC | Facility: CLINIC | Age: 78
End: 2018-01-03

## 2018-01-03 NOTE — TELEPHONE ENCOUNTER
----- Message from Deloris Lewis MD sent at 1/3/2018  1:32 AM EST -----  Labs stable. Okay to send letter to patient. Thanks.      Stable letter mailed.

## 2018-01-03 NOTE — PROGRESS NOTES
"Onel Erickson     VITALS: Blood pressure 121/81, pulse 97, height 170.2 cm (67.01\"), weight 46.7 kg (103 lb), SpO2 97 %.    Subjective  Chief Complaint:   Chief Complaint   Patient presents with   • Follow-up        History of Present Illness:  Patient is a 77 y.o. male with a medical history significant for CAD and COPD who presents to clinic secondary to medical followup. No new or acute concerns.  He is doing well. He continues to lose weight, but he states that he is feeling fine. He \"reckons\" that he just isn't eating enough, but he denies any symptoms.     Patient also has a history of hyperlipidemia and is currently on atorvastatin 20 mg orally daily. Denies any side effects of the medications. Last lipid panel in 8/2016 and was ok. Denies any muscle weakness, jaundice or itching.   Low cholesterol diet: Yes    Patient does have a history of hypothyroidism and is currently on synthroid 175 mcg orally daily. He is doing well on this medication. Denies any side effects. Denies fatigue, dizziness, palpitations, changes in weight, hair, or nails. Last thyroid panel was in 6/2017 and was abnormal.     Patient has a history of COPD and is currently on Advair 250/50 1 puff twice daily and Spiriva daily. Denies any side effects of the medications. Occasional shortness of breath, but no other coughing, wheezing, or night coughing. Reports that in the past CXR has shown \"fungus spots on his lungs\".  Exacerbation: No  Last utilized albuterol: Does not have albuterol    Patient has a history of CAD. He sees Dr. Harley, and is currently on Aspirin 81 mg daily. He does have an extensive history, including a cath, 5 bypasses, along with a history of PAD with stents in both legs along with a left arterial bypass in his lower extremity. In the past, he was on plavix, digoxin, imdur, and lasix, but Dr. Harley has since taken him off of these medications as he has been extremely stable in the past. Last saw Dr." Cherri some months ago. He quit smoking in 2012.  History of stroke: Yes  No complaints about any of the medications.    The following portions of the patient's history were reviewed and updated as appropriate: allergies, current medications, past family history, past medical history, past social history, past surgical history and problem list.    Past Medical History  Past Medical History:   Diagnosis Date   • Arthritis    • CAD (coronary artery disease)     Sees Dr. Harley   • COPD (chronic obstructive pulmonary disease)    • Former smoker     Stopped in 2012   • Hernia of abdominal wall    • History of TIAs    • Hyperlipidemia    • Hypertension    • Hypothyroidism    • Inguinal hernia    • PAD (peripheral artery disease)    • Ruptured lumbar disc    • Scrotal hernia     Left   • Stroke 2011    10 YEARS AGO   • Ventral hernia        Review of Systems  Constitutional: Denies any recent history of HAs, dizziness, fevers, chills, itching.  Eyes: Denies any changes in vision. Denies any blurry vision or diplopia.  Ears, Nose, Mouth, Throat: Denies any sore throat, rhinorrhea, or cough.  Cardiovascular: Denies any chest pain, pressure, or palpitations.  Respiratory: Denies any shortness of breath or wheezing.  Gastrointestinal: Denies any abdominal pain, nausea, vomiting, diarrhea, or constipation.  Genitourinary: Denies any changes in urination.  Musculoskeletal: Denies any muscle weakness.  Skin and/or breasts: Denies any rashes.  Neurological: Denies any changes in balance or gait.  Psychiatric: Denies any anxiety, depression, or insomnia. Denies any suicidal or homicidal ideations.  Endocrine: Denies any heat or cold intolerance. Denies any voice changes, polydipsia, or polyuria.  Hematologic/Lymphatic: Denies any anemia or easy bruising.    Surgical History  Past Surgical History:   Procedure Laterality Date   • AMPUTATION  06/2012    left great toe amputation   • ARTERIAL BYPASS SURGERY  10/2012    left  leg - groin to ankle; Dr. Browne; [facility].   • BACK SURGERY      age 30; spinal fusion   • CARDIAC SURGERY  08/2012    Open heart bypass; [facility]   • CAROTID STENT Bilateral    • CATARACT EXTRACTION  03/07/2016   • CORONARY ARTERY BYPASS GRAFT  2012    x5   • HERNIA REPAIR      age 35; inguinal hernia   • INGUINAL HERNIA REPAIR Left 9/15/2016    Procedure: INGUINAL HERNIA REPAIR;  Surgeon: Henry Bullock MD;  Location: [facility] OR;  Service:    • INGUINAL HERNIA REPAIR Left 11/18/2016    Procedure: INGUINAL HERNIA REPAIR;  Surgeon: Henry Bullock MD;  Location:  [facility] OR;  Service:    • LUNG BIOPSY  2012   • VENTRAL/INCISIONAL HERNIA REPAIR N/A 9/15/2016    Procedure: VENTRAL/INCISIONAL HERNIA REPAIR;  Surgeon: Henry Bullock MD;  Location:  [facility] OR;  Service:        Family History  Family History   Problem Relation Age of Onset   • Alzheimer's disease Mother    • Heart disease Father        Social History  Social History     Social History   • Marital status: Unknown     Spouse name: N/A   • Number of children: N/A   • Years of education: N/A     Occupational History   • Not on file.     Social History Main Topics   • Smoking status: Former Smoker     Packs/day: 1.00     Years: 30.00     Types: Cigarettes     Quit date: 9/14/2006   • Smokeless tobacco: Never Used   • Alcohol use No   • Drug use: No   • Sexual activity: Defer     Other Topics Concern   • Not on file     Social History Narrative       Objective  Physical Exam  Gen: Patient in NAD. Pleasant and answers appropriately. A&Ox3.    Skin: Warm and dry with normal turgor. No purpura, rashes, or unusual pigmentation noted. Hair is normal in appearance and distribution.    HEENT: NC/AT. No lesions noted. Conjunctiva clear, sclera nonicteric. PERRL. EOMI without nystagmus or strabismus. Fundi appear benign. No hemorrhages or exudates of eyes. Auditory canals are patent bilaterally without lesions. TMs intact,  nonerythematous,  nonbulging without lesions. Nasal mucosa pink, nonerythematous, and nonedematous. Frontal and maxillary sinuses are nontender. O/P nonerythematous and moist without exudate.    Neck: Supple without lymph nodes palpated. FROM.     Lungs: CTA B/L without rales, rhonchi, crackles, or wheezes.    Heart: RRR. S1 and S2 normal. No S3 or S4. No MRGT.    Abd: Soft, nontender,nondistended. (+)BSx4 quadrants.     Extrem: No CCE. Radial pulses 2+/4 and equal B/L. FROMx4. No bone, joint, or muscle tenderness noted.    Neuro: No focal motor/sensory deficits.    Procedures    Assessment/Plan  Onel Erickson is a 77 y.o. here for medical followup.  Diagnoses and all orders for this visit:    Renal insufficiency  -     Comprehensive Metabolic Panel  -     Osmolality, Calculated; Future  -     Osmolality, Calculated    Other specified hypothyroidism  -     levothyroxine (SYNTHROID) 150 MCG tablet; Take 1 tablet by mouth Daily.    Essential hypertension  -     lisinopril (PRINIVIL,ZESTRIL) 10 MG tablet; Take 1 tablet by mouth Daily.    Findings and plans discussed with patient who verbalizes understanding and agreement. Will followup with patient once results are in. Patient to followup at clinic PRN or in one month for further medical followup.    Deloris Lewis MD

## 2018-02-05 ENCOUNTER — TELEPHONE (OUTPATIENT)
Dept: FAMILY MEDICINE CLINIC | Facility: CLINIC | Age: 78
End: 2018-02-05

## 2018-02-05 NOTE — TELEPHONE ENCOUNTER
Patient came into the clinic reported his inhalers were no longer covered on his insurance,samples given of replacements per Dr. Nagel orders & sent new prescriptions to his pharmacy.

## 2018-05-17 ENCOUNTER — OFFICE VISIT (OUTPATIENT)
Dept: FAMILY MEDICINE CLINIC | Facility: CLINIC | Age: 78
End: 2018-05-17

## 2018-05-17 VITALS
HEART RATE: 75 BPM | DIASTOLIC BLOOD PRESSURE: 62 MMHG | BODY MASS INDEX: 15.38 KG/M2 | HEIGHT: 67 IN | WEIGHT: 98 LBS | SYSTOLIC BLOOD PRESSURE: 101 MMHG | OXYGEN SATURATION: 98 %

## 2018-05-17 DIAGNOSIS — E78.49 OTHER HYPERLIPIDEMIA: ICD-10-CM

## 2018-05-17 DIAGNOSIS — E03.8 OTHER SPECIFIED HYPOTHYROIDISM: ICD-10-CM

## 2018-05-17 DIAGNOSIS — R19.7 DIARRHEA, UNSPECIFIED TYPE: ICD-10-CM

## 2018-05-17 DIAGNOSIS — Z00.00 MEDICARE ANNUAL WELLNESS VISIT, SUBSEQUENT: Primary | ICD-10-CM

## 2018-05-17 PROCEDURE — G0439 PPPS, SUBSEQ VISIT: HCPCS | Performed by: FAMILY MEDICINE

## 2018-05-17 NOTE — PATIENT INSTRUCTIONS
Bring back the stool cultures.  Come back for fasting labs - we open at 830. No eating after midnight.

## 2018-05-22 PROCEDURE — 87899 AGENT NOS ASSAY W/OPTIC: CPT | Performed by: FAMILY MEDICINE

## 2018-05-22 PROCEDURE — 87209 SMEAR COMPLEX STAIN: CPT | Performed by: FAMILY MEDICINE

## 2018-05-22 PROCEDURE — 87046 STOOL CULTR AEROBIC BACT EA: CPT | Performed by: FAMILY MEDICINE

## 2018-05-22 PROCEDURE — 87045 FECES CULTURE AEROBIC BACT: CPT | Performed by: FAMILY MEDICINE

## 2018-05-22 PROCEDURE — 87324 CLOSTRIDIUM AG IA: CPT | Performed by: FAMILY MEDICINE

## 2018-05-22 PROCEDURE — 87177 OVA AND PARASITES SMEARS: CPT | Performed by: FAMILY MEDICINE

## 2018-05-23 LAB — C DIFF TOX A+B STL QL IA: NEGATIVE

## 2018-05-24 LAB — BACTERIA SPEC AEROBE CULT: NORMAL

## 2018-05-28 LAB
O+P SPEC MICRO: NORMAL
OVA + PARASITE RESULT 1: NORMAL

## 2018-05-31 NOTE — PROGRESS NOTES
QUICK REFERENCE INFORMATION:  The ABCs of the Annual Wellness Visit    Subsequent Medicare Wellness Visit    HEALTH RISK ASSESSMENT    1940    Recent Hospitalizations:  No hospitalization(s) within the last year..        Current Medical Providers:  Patient Care Team:  Deloris Lewis MD as PCP - General (Family Medicine)  Deloris Lewis MD as PCP - Claims Attributed        Smoking Status:  History   Smoking Status   • Former Smoker   • Packs/day: 1.00   • Years: 30.00   • Types: Cigarettes   • Quit date: 9/14/2006   Smokeless Tobacco   • Never Used       Alcohol Consumption:  History   Alcohol Use No       Depression Screen:   PHQ-2/PHQ-9 Depression Screening 5/17/2018   Little interest or pleasure in doing things 0   Feeling down, depressed, or hopeless 0   Total Score 0       Health Habits and Functional and Cognitive Screening:  Functional & Cognitive Status 5/17/2018   Do you have difficulty preparing food and eating? No   Do you have difficulty bathing yourself, getting dressed or grooming yourself? No   Do you have difficulty using the toilet? No   Do you have difficulty moving around from place to place? No   Do you have trouble with steps or getting out of a bed or a chair? No   In the past year have you fallen or experienced a near fall? No   Current Diet Well Balanced Diet   Dental Exam Not up to date   Eye Exam Up to date   Exercise (times per week) 5 times per week   Current Exercise Activities Include Walking   Do you need help using the phone?  No   Are you deaf or do you have serious difficulty hearing?  No   Do you need help with transportation? No   Do you need help shopping? No   Do you need help preparing meals?  No   Do you need help with housework?  No   Do you need help with laundry? No   Do you need help taking your medications? No   Do you need help managing money? No   Do you ever drive or ride in a car without wearing a seat belt? No           Does the patient have evidence of  cognitive impairment? Possibly    Aspirin use counseling: Taking ASA appropriately as indicated      Recent Lab Results:  CMP:  Lab Results   Component Value Date    GLU 76 09/09/2016    BUN 28 (H) 12/19/2017    CREATININE 1.18 12/19/2017    EGFRIFNONA 60 (L) 12/19/2017    EGFRIFAFRI 61 09/09/2016    BCR 23.7 12/19/2017     12/19/2017    K 4.2 12/19/2017    CO2 26.8 12/19/2017    CALCIUM 9.6 12/19/2017    PROTENTOTREF 7.7 09/09/2016    ALBUMIN 4.20 12/19/2017    LABGLOBREF 3.2 09/09/2016    LABIL2 1.4 (L) 12/19/2017    BILITOT 0.7 12/19/2017    ALKPHOS 86 12/19/2017    AST 32 12/19/2017    ALT 36 12/19/2017     Lipid Panel:  Lab Results   Component Value Date    TRIG 91 08/17/2016    HDL 75 08/17/2016    VLDL 18.2 08/17/2016     HbA1c:  Lab Results   Component Value Date    HGBA1C 5.80 (H) 08/17/2016       Visual Acuity:  No exam data present    Age-appropriate Screening Schedule:  Refer to the list below for future screening recommendations based on patient's age, sex and/or medical conditions. Orders for these recommended tests are listed in the plan section. The patient has been provided with a written plan.    Health Maintenance   Topic Date Due   • TDAP/TD VACCINES (1 - Tdap) 05/22/1959   • PNEUMOCOCCAL VACCINES (65+ LOW/MEDIUM RISK) (1 of 2 - PCV13) 05/22/2005   • LIPID PANEL  08/17/2017   • ZOSTER VACCINE (2 of 2) 07/12/2018   • INFLUENZA VACCINE  08/01/2018        Subjective   History of Present Illness    Onel Erickson is a 78 y.o. male who presents for an Subsequent Wellness Visit.    The following portions of the patient's history were reviewed and updated as appropriate: allergies, current medications, past family history, past medical history, past social history, past surgical history and problem list.    Outpatient Medications Prior to Visit   Medication Sig Dispense Refill   • aclidinium bromide (TUDORZA PRESSAIR) 400 MCG/ACT aerosol powder  powder for inhalation Inhale 1 puff Daily. 1  each 5   • aspirin 81 MG EC tablet Take 1 tablet by mouth Daily. 30 tablet 5   • atorvastatin (LIPITOR) 20 MG tablet Take 1 tablet by mouth Daily. 30 tablet 5   • clotrimazole (LOTRIMIN) 1 % cream Apply  topically 2 (Two) Times a Day. Over the toenails 45 g 0   • Fluticasone Furoate-Vilanterol 100-25 MCG/INH aerosol powder  Inhale 1 puff Daily. 31 each 5   • ibuprofen (ADVIL,MOTRIN) 200 MG tablet Take 200 mg by mouth Every 6 (Six) Hours As Needed for mild pain (1-3).     • levothyroxine (SYNTHROID) 150 MCG tablet Take 1 tablet by mouth Daily. 30 tablet 2   • lisinopril (PRINIVIL,ZESTRIL) 10 MG tablet Take 1 tablet by mouth Daily. 30 tablet 5   • Multiple Vitamins-Minerals (PX COMPLETE SENIOR MULTIVITS PO) Take  by mouth.     • azithromycin (ZITHROMAX Z-PABLO) 250 MG tablet Take 2 tablets the first day, then 1 tablet daily for 4 days. 6 tablet 0     No facility-administered medications prior to visit.        Patient Active Problem List   Diagnosis   • Hypothyroidism   • Hyperlipidemia   • COPD (chronic obstructive pulmonary disease)   • PAD (peripheral artery disease)   • CAD (coronary artery disease)   • History of TIAs   • Elevated liver enzymes   • Renal insufficiency       Advance Care Planning:  has an advance directive - a copy HAS NOT been provided. Have asked the patient to send this to us to add to record.    Identification of Risk Factors:  Risk factors include: weight , unhealthy diet, inadequate social support, lack of transportation, isolation, cognitive impairment and hearing limitations.    Review of Systems  Constitutional: Denies any recent history of HAs, dizziness, fevers, chills, itching.  Eyes: Denies any changes in vision. Denies any blurry vision or diplopia.  Ears, Nose, Mouth, Throat: Denies any sore throat, rhinorrhea, or cough.  Cardiovascular: Denies any chest pain, pressure, or palpitations.  Respiratory: Denies any shortness of breath or wheezing.  Gastrointestinal: Denies any abdominal  "pain, nausea, vomiting, diarrhea, or constipation.  Genitourinary: Denies any changes in urination.  Musculoskeletal: Denies any muscle weakness.  Skin and/or breasts: Denies any rashes.  Neurological: Denies any changes in balance or gait.  Psychiatric: Denies any anxiety, depression, or insomnia. Denies any suicidal or homicidal ideations.  Endocrine: Denies any heat or cold intolerance. Denies any voice changes, polydipsia, or polyuria.  Hematologic/Lymphatic: Denies any anemia or easy bruising.     Compared to one year ago, the patient feels his physical health is the same.  Compared to one year ago, the patient feels his mental health is the same.    Objective     Physical Exam    Vitals:    05/17/18 1034   BP: 101/62   BP Location: Right arm   Patient Position: Sitting   Pulse: 75   SpO2: 98%   Weight: 44.5 kg (98 lb)   Height: 170 cm (66.93\")       Patient's Body mass index is 15.38 kg/m². BMI is below normal parameters. Recommendations include: treating the underlying disease process.    Gen: Patient in NAD. Pleasant and answers appropriately. A&Ox3.    Skin: Warm and dry with normal turgor. No purpura, rashes, or unusual pigmentation noted. Hair is normal in appearance and distribution.    HEENT: NC/AT. No lesions noted. Conjunctiva clear, sclera nonicteric. PERRL. EOMI without nystagmus or strabismus. Fundi appear benign. No hemorrhages or exudates of eyes. Auditory canals are patent bilaterally without lesions. TMs intact,  nonerythematous, nonbulging without lesions. Nasal mucosa pink, nonerythematous, and nonedematous. Frontal and maxillary sinuses are nontender. O/P nonerythematous and moist without exudate.    Neck: Supple without lymph nodes palpated. FROM.      Lungs: CTA B/L without rales, rhonchi, crackles, or wheezes.    Heart: RRR. S1 and S2 normal. No S3 or S4. No MRGT.    Abd: Soft, nontender,nondistended. (+)BSx4 quadrants.     Extrem: No CCE. Radial pulses 2+/4 and equal B/L. FROMx4. No " bone, joint, or muscle tenderness noted.    Neuro: No focal motor/sensory deficits.     Assessment/Plan   Patient Self-Management and Personalized Health Advice  The patient has been provided with information about: fall prevention and preventive services including:   · Advance directive, Exercise counseling provided, Fall Risk assessment done, Fall Risk plan of care done, Glaucoma screening recommended, Nutrition counseling provided.    Visit Diagnoses:    ICD-10-CM ICD-9-CM   1. Medicare annual wellness visit, subsequent Z00.00 V70.0   2. Other specified hypothyroidism E03.8 244.8   3. Other hyperlipidemia E78.4 272.4   4. Diarrhea, unspecified type R19.7 787.91       Orders Placed This Encounter   Procedures   • Stool Culture - Stool, Per Rectum   • Ova & Parasite Examination - Stool, Per Rectum   • Clostridium Difficile EIA - Stool, Per Rectum   • Comprehensive Metabolic Panel   • Lipid Panel   • TSH   • T4, Free   • CBC & Differential     Order Specific Question:   Manual Differential     Answer:   No       Outpatient Encounter Prescriptions as of 5/17/2018   Medication Sig Dispense Refill   • aclidinium bromide (TUDORZA PRESSAIR) 400 MCG/ACT aerosol powder  powder for inhalation Inhale 1 puff Daily. 1 each 5   • aspirin 81 MG EC tablet Take 1 tablet by mouth Daily. 30 tablet 5   • atorvastatin (LIPITOR) 20 MG tablet Take 1 tablet by mouth Daily. 30 tablet 5   • clotrimazole (LOTRIMIN) 1 % cream Apply  topically 2 (Two) Times a Day. Over the toenails 45 g 0   • Fluticasone Furoate-Vilanterol 100-25 MCG/INH aerosol powder  Inhale 1 puff Daily. 31 each 5   • ibuprofen (ADVIL,MOTRIN) 200 MG tablet Take 200 mg by mouth Every 6 (Six) Hours As Needed for mild pain (1-3).     • levothyroxine (SYNTHROID) 150 MCG tablet Take 1 tablet by mouth Daily. 30 tablet 2   • lisinopril (PRINIVIL,ZESTRIL) 10 MG tablet Take 1 tablet by mouth Daily. 30 tablet 5   • Multiple Vitamins-Minerals (PX COMPLETE SENIOR MULTIVITS PO) Take   by mouth.     • [DISCONTINUED] azithromycin (ZITHROMAX Z-PABLO) 250 MG tablet Take 2 tablets the first day, then 1 tablet daily for 4 days. 6 tablet 0     No facility-administered encounter medications on file as of 5/17/2018.        Reviewed use of high risk medication in the elderly: yes  Reviewed for potential of harmful drug interactions in the elderly: yes    Follow Up:  Return in about 6 months (around 11/17/2018).     An After Visit Summary and PPPS with all of these plans were given to the patient.

## 2018-06-01 ENCOUNTER — TELEPHONE (OUTPATIENT)
Dept: FAMILY MEDICINE CLINIC | Facility: CLINIC | Age: 78
End: 2018-06-01

## 2018-06-01 DIAGNOSIS — E03.8 OTHER SPECIFIED HYPOTHYROIDISM: ICD-10-CM

## 2018-06-01 RX ORDER — ATORVASTATIN CALCIUM 20 MG/1
20 TABLET, FILM COATED ORAL DAILY
Qty: 30 TABLET | Refills: 5 | Status: SHIPPED | OUTPATIENT
Start: 2018-06-01 | End: 2018-10-24 | Stop reason: SDUPTHER

## 2018-06-01 RX ORDER — LEVOTHYROXINE SODIUM 0.15 MG/1
150 TABLET ORAL DAILY
Qty: 30 TABLET | Refills: 5 | Status: SHIPPED | OUTPATIENT
Start: 2018-06-01 | End: 2018-08-02 | Stop reason: SDUPTHER

## 2018-07-27 LAB
ALBUMIN SERPL-MCNC: 4 G/DL (ref 3.4–4.8)
ALBUMIN/GLOB SERPL: 1.5 G/DL (ref 1.5–2.5)
ALP SERPL-CCNC: 85 U/L (ref 40–129)
ALT SERPL W P-5'-P-CCNC: 23 U/L (ref 10–44)
ANION GAP SERPL CALCULATED.3IONS-SCNC: 5.6 MMOL/L (ref 3.6–11.2)
AST SERPL-CCNC: 24 U/L (ref 10–34)
BASOPHILS # BLD AUTO: 0.03 10*3/MM3 (ref 0–0.3)
BASOPHILS NFR BLD AUTO: 0.5 % (ref 0–2)
BILIRUB SERPL-MCNC: 0.8 MG/DL (ref 0.2–1.8)
BUN BLD-MCNC: 26 MG/DL (ref 7–21)
BUN/CREAT SERPL: 18.3 (ref 7–25)
CALCIUM SPEC-SCNC: 9.2 MG/DL (ref 7.7–10)
CHLORIDE SERPL-SCNC: 103 MMOL/L (ref 99–112)
CHOLEST SERPL-MCNC: 134 MG/DL (ref 0–200)
CO2 SERPL-SCNC: 29.4 MMOL/L (ref 24.3–31.9)
CREAT BLD-MCNC: 1.42 MG/DL (ref 0.43–1.29)
DEPRECATED RDW RBC AUTO: 46.9 FL (ref 37–54)
EOSINOPHIL # BLD AUTO: 0.26 10*3/MM3 (ref 0–0.7)
EOSINOPHIL NFR BLD AUTO: 4.1 % (ref 0–7)
ERYTHROCYTE [DISTWIDTH] IN BLOOD BY AUTOMATED COUNT: 13.2 % (ref 11.5–14.5)
GFR SERPL CREATININE-BSD FRML MDRD: 48 ML/MIN/1.73
GLOBULIN UR ELPH-MCNC: 2.7 GM/DL
GLUCOSE BLD-MCNC: 87 MG/DL (ref 70–110)
HCT VFR BLD AUTO: 40 % (ref 42–52)
HDLC SERPL-MCNC: 54 MG/DL (ref 60–100)
HGB BLD-MCNC: 12.8 G/DL (ref 14–18)
IMM GRANULOCYTES # BLD: 0.01 10*3/MM3 (ref 0–0.03)
IMM GRANULOCYTES NFR BLD: 0.2 % (ref 0–0.5)
LDLC SERPL CALC-MCNC: 67 MG/DL (ref 0–100)
LDLC/HDLC SERPL: 1.24 {RATIO}
LYMPHOCYTES # BLD AUTO: 0.79 10*3/MM3 (ref 1–3)
LYMPHOCYTES NFR BLD AUTO: 12.5 % (ref 16–46)
MCH RBC QN AUTO: 31.9 PG (ref 27–33)
MCHC RBC AUTO-ENTMCNC: 32 G/DL (ref 33–37)
MCV RBC AUTO: 99.8 FL (ref 80–94)
MONOCYTES # BLD AUTO: 0.49 10*3/MM3 (ref 0.1–0.9)
MONOCYTES NFR BLD AUTO: 7.7 % (ref 0–12)
NEUTROPHILS # BLD AUTO: 4.76 10*3/MM3 (ref 1.4–6.5)
NEUTROPHILS NFR BLD AUTO: 75 % (ref 40–75)
OSMOLALITY SERPL CALC.SUM OF ELEC: 279.8 MOSM/KG (ref 273–305)
PLATELET # BLD AUTO: 125 10*3/MM3 (ref 130–400)
PMV BLD AUTO: 11.1 FL (ref 6–10)
POTASSIUM BLD-SCNC: 4.3 MMOL/L (ref 3.5–5.3)
PROT SERPL-MCNC: 6.7 G/DL (ref 6–8)
RBC # BLD AUTO: 4.01 10*6/MM3 (ref 4.7–6.1)
SODIUM BLD-SCNC: 138 MMOL/L (ref 135–153)
T4 FREE SERPL-MCNC: 2.16 NG/DL (ref 0.89–1.76)
TRIGL SERPL-MCNC: 65 MG/DL (ref 0–150)
TSH SERPL DL<=0.05 MIU/L-ACNC: 0.02 MIU/ML (ref 0.55–4.78)
VLDLC SERPL-MCNC: 13 MG/DL
WBC NRBC COR # BLD: 6.34 10*3/MM3 (ref 4.5–12.5)

## 2018-07-27 PROCEDURE — 84439 ASSAY OF FREE THYROXINE: CPT | Performed by: FAMILY MEDICINE

## 2018-07-27 PROCEDURE — 85025 COMPLETE CBC W/AUTO DIFF WBC: CPT | Performed by: FAMILY MEDICINE

## 2018-07-27 PROCEDURE — 84443 ASSAY THYROID STIM HORMONE: CPT | Performed by: FAMILY MEDICINE

## 2018-07-27 PROCEDURE — 80053 COMPREHEN METABOLIC PANEL: CPT | Performed by: FAMILY MEDICINE

## 2018-07-27 PROCEDURE — 80061 LIPID PANEL: CPT | Performed by: FAMILY MEDICINE

## 2018-07-31 ENCOUNTER — TELEPHONE (OUTPATIENT)
Dept: FAMILY MEDICINE CLINIC | Facility: CLINIC | Age: 78
End: 2018-07-31

## 2018-07-31 DIAGNOSIS — E03.8 OTHER SPECIFIED HYPOTHYROIDISM: ICD-10-CM

## 2018-08-02 RX ORDER — LEVOTHYROXINE SODIUM 137 UG/1
137 TABLET ORAL DAILY
Qty: 30 TABLET | Refills: 3 | Status: SHIPPED | OUTPATIENT
Start: 2018-08-02 | End: 2018-10-24 | Stop reason: SDUPTHER

## 2018-10-24 ENCOUNTER — OFFICE VISIT (OUTPATIENT)
Dept: FAMILY MEDICINE CLINIC | Facility: CLINIC | Age: 78
End: 2018-10-24

## 2018-10-24 VITALS
WEIGHT: 96 LBS | OXYGEN SATURATION: 97 % | TEMPERATURE: 98 F | DIASTOLIC BLOOD PRESSURE: 71 MMHG | SYSTOLIC BLOOD PRESSURE: 119 MMHG | HEIGHT: 66 IN | BODY MASS INDEX: 15.43 KG/M2 | HEART RATE: 52 BPM

## 2018-10-24 DIAGNOSIS — R79.89 ELEVATED FERRITIN: ICD-10-CM

## 2018-10-24 DIAGNOSIS — E03.8 OTHER SPECIFIED HYPOTHYROIDISM: ICD-10-CM

## 2018-10-24 DIAGNOSIS — N28.9 RENAL INSUFFICIENCY: ICD-10-CM

## 2018-10-24 DIAGNOSIS — J43.8 OTHER EMPHYSEMA (HCC): ICD-10-CM

## 2018-10-24 DIAGNOSIS — R31.29 OTHER MICROSCOPIC HEMATURIA: ICD-10-CM

## 2018-10-24 DIAGNOSIS — R35.0 FREQUENT URINATION: Primary | ICD-10-CM

## 2018-10-24 DIAGNOSIS — E78.49 OTHER HYPERLIPIDEMIA: ICD-10-CM

## 2018-10-24 LAB
ALBUMIN SERPL-MCNC: 3.9 G/DL (ref 3.4–4.8)
ALBUMIN/GLOB SERPL: 1.2 G/DL (ref 1.5–2.5)
ALP SERPL-CCNC: 87 U/L (ref 40–129)
ALT SERPL W P-5'-P-CCNC: 23 U/L (ref 10–44)
ANION GAP SERPL CALCULATED.3IONS-SCNC: 4.5 MMOL/L (ref 3.6–11.2)
AST SERPL-CCNC: 23 U/L (ref 10–34)
BASOPHILS # BLD AUTO: 0.03 10*3/MM3 (ref 0–0.3)
BASOPHILS NFR BLD AUTO: 0.4 % (ref 0–2)
BILIRUB BLD-MCNC: NEGATIVE MG/DL
BILIRUB SERPL-MCNC: 0.5 MG/DL (ref 0.2–1.8)
BUN BLD-MCNC: 41 MG/DL (ref 7–21)
BUN/CREAT SERPL: 34.5 (ref 7–25)
CALCIUM SPEC-SCNC: 9 MG/DL (ref 7.7–10)
CHLORIDE SERPL-SCNC: 102 MMOL/L (ref 99–112)
CLARITY, POC: CLEAR
CO2 SERPL-SCNC: 30.5 MMOL/L (ref 24.3–31.9)
COLOR UR: YELLOW
CREAT BLD-MCNC: 1.19 MG/DL (ref 0.43–1.29)
DEPRECATED RDW RBC AUTO: 52.4 FL (ref 37–54)
EOSINOPHIL # BLD AUTO: 0.17 10*3/MM3 (ref 0–0.7)
EOSINOPHIL NFR BLD AUTO: 2 % (ref 0–7)
ERYTHROCYTE [DISTWIDTH] IN BLOOD BY AUTOMATED COUNT: 15.3 % (ref 11.5–14.5)
FERRITIN SERPL-MCNC: 486 NG/ML (ref 21.9–321.7)
GFR SERPL CREATININE-BSD FRML MDRD: 59 ML/MIN/1.73
GLOBULIN UR ELPH-MCNC: 3.2 GM/DL
GLUCOSE BLD-MCNC: 86 MG/DL (ref 70–110)
GLUCOSE UR STRIP-MCNC: NEGATIVE MG/DL
HCT VFR BLD AUTO: 40.4 % (ref 42–52)
HGB BLD-MCNC: 12.8 G/DL (ref 14–18)
IMM GRANULOCYTES # BLD: 0.01 10*3/MM3 (ref 0–0.03)
IMM GRANULOCYTES NFR BLD: 0.1 % (ref 0–0.5)
KETONES UR QL: NEGATIVE
LEUKOCYTE EST, POC: NEGATIVE
LYMPHOCYTES # BLD AUTO: 0.87 10*3/MM3 (ref 1–3)
LYMPHOCYTES NFR BLD AUTO: 10.5 % (ref 16–46)
MAGNESIUM SERPL-MCNC: 2.3 MG/DL (ref 1.7–2.6)
MCH RBC QN AUTO: 31 PG (ref 27–33)
MCHC RBC AUTO-ENTMCNC: 31.7 G/DL (ref 33–37)
MCV RBC AUTO: 97.8 FL (ref 80–94)
MONOCYTES # BLD AUTO: 0.75 10*3/MM3 (ref 0.1–0.9)
MONOCYTES NFR BLD AUTO: 9 % (ref 0–12)
NEUTROPHILS # BLD AUTO: 6.48 10*3/MM3 (ref 1.4–6.5)
NEUTROPHILS NFR BLD AUTO: 78 % (ref 40–75)
NITRITE UR-MCNC: NEGATIVE MG/ML
OSMOLALITY SERPL CALC.SUM OF ELEC: 283.2 MOSM/KG (ref 273–305)
PH UR: 5.5 [PH] (ref 5–8)
PLATELET # BLD AUTO: 190 10*3/MM3 (ref 130–400)
PMV BLD AUTO: 11.4 FL (ref 6–10)
POTASSIUM BLD-SCNC: 4.1 MMOL/L (ref 3.5–5.3)
PROT SERPL-MCNC: 7.1 G/DL (ref 6–8)
PROT UR STRIP-MCNC: ABNORMAL MG/DL
RBC # BLD AUTO: 4.13 10*6/MM3 (ref 4.7–6.1)
RBC # UR STRIP: ABNORMAL /UL
SODIUM BLD-SCNC: 137 MMOL/L (ref 135–153)
SP GR UR: 1.02 (ref 1–1.03)
T4 FREE SERPL-MCNC: 1.94 NG/DL (ref 0.89–1.76)
TSH SERPL DL<=0.05 MIU/L-ACNC: 0.03 MIU/ML (ref 0.55–4.78)
UROBILINOGEN UR QL: NORMAL
VIT B12 BLD-MCNC: 1072 PG/ML (ref 211–911)
WBC NRBC COR # BLD: 8.31 10*3/MM3 (ref 4.5–12.5)

## 2018-10-24 PROCEDURE — 80053 COMPREHEN METABOLIC PANEL: CPT | Performed by: NURSE PRACTITIONER

## 2018-10-24 PROCEDURE — 81003 URINALYSIS AUTO W/O SCOPE: CPT | Performed by: NURSE PRACTITIONER

## 2018-10-24 PROCEDURE — 99214 OFFICE O/P EST MOD 30 MIN: CPT | Performed by: NURSE PRACTITIONER

## 2018-10-24 PROCEDURE — 84439 ASSAY OF FREE THYROXINE: CPT | Performed by: NURSE PRACTITIONER

## 2018-10-24 PROCEDURE — 82607 VITAMIN B-12: CPT | Performed by: NURSE PRACTITIONER

## 2018-10-24 PROCEDURE — 84153 ASSAY OF PSA TOTAL: CPT | Performed by: NURSE PRACTITIONER

## 2018-10-24 PROCEDURE — 82728 ASSAY OF FERRITIN: CPT | Performed by: NURSE PRACTITIONER

## 2018-10-24 PROCEDURE — 84443 ASSAY THYROID STIM HORMONE: CPT | Performed by: NURSE PRACTITIONER

## 2018-10-24 PROCEDURE — 36415 COLL VENOUS BLD VENIPUNCTURE: CPT | Performed by: NURSE PRACTITIONER

## 2018-10-24 PROCEDURE — 85025 COMPLETE CBC W/AUTO DIFF WBC: CPT | Performed by: NURSE PRACTITIONER

## 2018-10-24 PROCEDURE — 83735 ASSAY OF MAGNESIUM: CPT | Performed by: NURSE PRACTITIONER

## 2018-10-24 RX ORDER — LEVOTHYROXINE SODIUM 137 UG/1
137 TABLET ORAL DAILY
Qty: 30 TABLET | Refills: 3 | Status: SHIPPED | OUTPATIENT
Start: 2018-10-24 | End: 2018-11-20

## 2018-10-24 RX ORDER — ATORVASTATIN CALCIUM 20 MG/1
20 TABLET, FILM COATED ORAL DAILY
Qty: 30 TABLET | Refills: 5 | Status: SHIPPED | OUTPATIENT
Start: 2018-10-24 | End: 2018-11-20 | Stop reason: SDUPTHER

## 2018-10-24 RX ORDER — LISINOPRIL 10 MG/1
10 TABLET ORAL DAILY
Qty: 30 TABLET | Refills: 5 | Status: SHIPPED | OUTPATIENT
Start: 2018-10-24 | End: 2018-11-20 | Stop reason: SDUPTHER

## 2018-10-24 NOTE — PROGRESS NOTES
Subjective   Onel Erickson is a 78 y.o. male.     Chief Complaint: Urinary Frequency and Weight Loss    COPD   This is a chronic problem. The current episode started more than 1 year ago. The problem has been waxing and waning. Associated symptoms include fatigue. Nothing aggravates the symptoms. Treatments tried: pt states that he has not been using his inhalers and his breathing is not good at times; discussed importance of using his inhalers and taking medications as prescribed    Hypothyroidism   This is a chronic problem. The current episode started more than 1 year ago. Associated symptoms include fatigue. Associated symptoms comments: His last TSH was low and his levothyroxine was decreased to 137mcg daily; he has not had the TSH rechecked since that time. Nothing aggravates the symptoms.   Hyperlipidemia   This is a chronic problem. The current episode started more than 1 year ago. Exacerbating diseases include hypothyroidism. Associated symptoms include shortness of breath. He is currently on no antihyperlipidemic treatment (pt stopped taking  the  medication). Risk factors for coronary artery disease include dyslipidemia, hypertension, male sex and a sedentary lifestyle.   Urinary Frequency    This is a new problem. The current episode started more than 1 month ago. The problem has been unchanged. The patient is experiencing no pain. There has been no fever. Associated symptoms include frequency. pt states that he came into the office today because hes afraid that his kidney function is going bad again; she has had an elevated creatinine in the past and was referred to nephrology but did not keep the appointment        Family History   Problem Relation Age of Onset   • Alzheimer's disease Mother    • Heart disease Father        Social History     Social History   • Marital status: Unknown     Spouse name: N/A   • Number of children: N/A   • Years of education: N/A     Occupational History   • Not on  "file.     Social History Main Topics   • Smoking status: Former Smoker     Packs/day: 1.00     Years: 30.00     Types: Cigarettes     Quit date: 9/14/2006   • Smokeless tobacco: Never Used   • Alcohol use No   • Drug use: No   • Sexual activity: Defer     Other Topics Concern   • Not on file     Social History Narrative   • No narrative on file       Past Medical History:   Diagnosis Date   • Arthritis    • CAD (coronary artery disease)     Sees Dr. Harley   • COPD (chronic obstructive pulmonary disease) (CMS/[facility])    • Former smoker     Stopped in 2012   • Hernia of abdominal wall    • History of TIAs    • Hyperlipidemia    • Hypertension    • Hypothyroidism    • Inguinal hernia    • PAD (peripheral artery disease) (CMS/[facility])    • Ruptured lumbar disc    • Scrotal hernia     Left   • Stroke (CMS/[facility]) 2011    10 YEARS AGO   • Ventral hernia        Review of Systems   Constitutional: Positive for fatigue.   HENT: Negative.    Respiratory: Positive for shortness of breath.    Cardiovascular: Negative.    Gastrointestinal: Negative.    Genitourinary: Positive for frequency.   Musculoskeletal: Negative.    Skin: Negative.    Neurological: Negative.    Psychiatric/Behavioral: Negative.        Objective   Physical Exam   Constitutional: He is oriented to person, place, and time. He appears well-developed and well-nourished.   Neck: Normal range of motion. Neck supple.   Cardiovascular: Normal rate, regular rhythm and normal heart sounds.    Pulmonary/Chest: Effort normal. No respiratory distress. He has wheezes.   Neurological: He is alert and oriented to person, place, and time.   Skin: Skin is warm and dry.   Psychiatric: He has a normal mood and affect. His behavior is normal. Judgment and thought content normal.   Nursing note and vitals reviewed.      Procedures    Vitals: Blood pressure 119/71, pulse 52, temperature 98 °F (36.7 °C), temperature source Oral, height 167.6 cm (66\"), weight 43.5 kg (96 lb), SpO2 97 " %.    Allergies:   Allergies   Allergen Reactions   • Beta Adrenergic Blockers Other (See Comments)     Bottoms BP out        During this visit the following were done:  Labs Reviewed []    Labs Ordered []    Radiology Reports Reviewed []    Radiology Ordered []    PCP Records Reviewed []    Referring Provider Records Reviewed []    ER Records Reviewed []    Hospital Records Reviewed []    History Obtained From Family []    Radiology Images Reviewed []    Other Reviewed []    Records Requested []      Assessment/Plan   Onel was seen today for urinary frequency and weight loss.    Diagnoses and all orders for this visit:    Frequent urination  -     POCT urinalysis dipstick, automated  -     CBC & Differential  -     Comprehensive Metabolic Panel  -     Magnesium  -     TSH  -     T4, Free  -     Vitamin B12  -     CBC Auto Differential  -     PSA DIAGNOSTIC    Other specified hypothyroidism  -     levothyroxine (SYNTHROID, LEVOTHROID) 137 MCG tablet; Take 1 tablet by mouth Daily.  -     CBC & Differential  -     Comprehensive Metabolic Panel  -     Magnesium  -     TSH  -     T4, Free  -     Vitamin B12  -     CBC Auto Differential    Renal insufficiency  -     lisinopril (PRINIVIL,ZESTRIL) 10 MG tablet; Take 1 tablet by mouth Daily.  -     CBC & Differential  -     Comprehensive Metabolic Panel  -     Magnesium  -     TSH  -     T4, Free  -     Vitamin B12  -     CBC Auto Differential    Other hyperlipidemia  -     atorvastatin (LIPITOR) 20 MG tablet; Take 1 tablet by mouth Daily.  -     CBC & Differential  -     Comprehensive Metabolic Panel  -     Magnesium  -     TSH  -     T4, Free  -     Vitamin B12  -     CBC Auto Differential    Other emphysema (CMS/HCC)  -     aclidinium bromide (TUDORZA PRESSAIR) 400 MCG/ACT aerosol powder  powder for inhalation; Inhale 1 puff Daily.  -     Fluticasone Furoate-Vilanterol 100-25 MCG/INH aerosol powder ; Inhale 1 puff Daily.  -     CBC & Differential  -     Comprehensive  Metabolic Panel  -     Magnesium  -     TSH  -     T4, Free  -     Vitamin B12  -     CBC Auto Differential    Elevated ferritin  -     Ferritin    Other microscopic hematuria  -     PSA DIAGNOSTIC      Labs today.  Pt has follow up with Dr Lewis next month; I have advised him to keep this appt as scheduled.

## 2018-10-25 LAB — PSA SERPL-MCNC: 0.86 NG/ML (ref 0–4)

## 2018-10-25 NOTE — PROGRESS NOTES
His labs actually look a little better with the exception of the TSH.  Anything you want me to change other than his levothyroxine?

## 2018-11-20 ENCOUNTER — OFFICE VISIT (OUTPATIENT)
Dept: FAMILY MEDICINE CLINIC | Facility: CLINIC | Age: 78
End: 2018-11-20

## 2018-11-20 VITALS
SYSTOLIC BLOOD PRESSURE: 122 MMHG | HEART RATE: 64 BPM | DIASTOLIC BLOOD PRESSURE: 87 MMHG | WEIGHT: 99.2 LBS | HEIGHT: 66 IN | OXYGEN SATURATION: 95 % | BODY MASS INDEX: 15.94 KG/M2 | TEMPERATURE: 98.7 F

## 2018-11-20 DIAGNOSIS — E78.49 OTHER HYPERLIPIDEMIA: ICD-10-CM

## 2018-11-20 DIAGNOSIS — J43.8 OTHER EMPHYSEMA (HCC): Primary | ICD-10-CM

## 2018-11-20 DIAGNOSIS — I10 ESSENTIAL HYPERTENSION: ICD-10-CM

## 2018-11-20 DIAGNOSIS — E03.8 OTHER SPECIFIED HYPOTHYROIDISM: ICD-10-CM

## 2018-11-20 DIAGNOSIS — N28.9 RENAL INSUFFICIENCY: ICD-10-CM

## 2018-11-20 LAB
ALBUMIN SERPL-MCNC: 4.1 G/DL (ref 3.4–4.8)
ALBUMIN/GLOB SERPL: 1.3 G/DL (ref 1.5–2.5)
ALP SERPL-CCNC: 94 U/L (ref 40–129)
ALT SERPL W P-5'-P-CCNC: 26 U/L (ref 10–44)
ANION GAP SERPL CALCULATED.3IONS-SCNC: 12.1 MMOL/L (ref 3.6–11.2)
AST SERPL-CCNC: 26 U/L (ref 10–34)
BILIRUB SERPL-MCNC: 0.7 MG/DL (ref 0.2–1.8)
BUN BLD-MCNC: 36 MG/DL (ref 7–21)
BUN/CREAT SERPL: 24 (ref 7–25)
CALCIUM SPEC-SCNC: 9.3 MG/DL (ref 7.7–10)
CHLORIDE SERPL-SCNC: 101 MMOL/L (ref 99–112)
CO2 SERPL-SCNC: 24.9 MMOL/L (ref 24.3–31.9)
CREAT BLD-MCNC: 1.5 MG/DL (ref 0.43–1.29)
GFR SERPL CREATININE-BSD FRML MDRD: 45 ML/MIN/1.73
GLOBULIN UR ELPH-MCNC: 3.1 GM/DL
GLUCOSE BLD-MCNC: 82 MG/DL (ref 70–110)
OSMOLALITY SERPL CALC.SUM OF ELEC: 283.1 MOSM/KG (ref 273–305)
POTASSIUM BLD-SCNC: 4.6 MMOL/L (ref 3.5–5.3)
PROT SERPL-MCNC: 7.2 G/DL (ref 6–8)
SODIUM BLD-SCNC: 138 MMOL/L (ref 135–153)
T4 FREE SERPL-MCNC: 2.16 NG/DL (ref 0.89–1.76)
TSH SERPL DL<=0.05 MIU/L-ACNC: 0.03 MIU/ML (ref 0.55–4.78)

## 2018-11-20 PROCEDURE — 36415 COLL VENOUS BLD VENIPUNCTURE: CPT | Performed by: FAMILY MEDICINE

## 2018-11-20 PROCEDURE — 80053 COMPREHEN METABOLIC PANEL: CPT | Performed by: FAMILY MEDICINE

## 2018-11-20 PROCEDURE — 86800 THYROGLOBULIN ANTIBODY: CPT | Performed by: FAMILY MEDICINE

## 2018-11-20 PROCEDURE — 86376 MICROSOMAL ANTIBODY EACH: CPT | Performed by: FAMILY MEDICINE

## 2018-11-20 PROCEDURE — 84445 ASSAY OF TSI GLOBULIN: CPT | Performed by: FAMILY MEDICINE

## 2018-11-20 PROCEDURE — 84439 ASSAY OF FREE THYROXINE: CPT | Performed by: FAMILY MEDICINE

## 2018-11-20 PROCEDURE — 84443 ASSAY THYROID STIM HORMONE: CPT | Performed by: FAMILY MEDICINE

## 2018-11-20 PROCEDURE — 99214 OFFICE O/P EST MOD 30 MIN: CPT | Performed by: FAMILY MEDICINE

## 2018-11-20 RX ORDER — LISINOPRIL 10 MG/1
10 TABLET ORAL DAILY
Qty: 30 TABLET | Refills: 5 | Status: SHIPPED | OUTPATIENT
Start: 2018-11-20 | End: 2019-01-01 | Stop reason: SDUPTHER

## 2018-11-20 RX ORDER — LEVOTHYROXINE SODIUM 137 UG/1
137 TABLET ORAL DAILY
Qty: 30 TABLET | Refills: 3 | Status: CANCELLED | OUTPATIENT
Start: 2018-11-20

## 2018-11-20 RX ORDER — ATORVASTATIN CALCIUM 20 MG/1
20 TABLET, FILM COATED ORAL DAILY
Qty: 30 TABLET | Refills: 5 | Status: SHIPPED | OUTPATIENT
Start: 2018-11-20 | End: 2019-01-01 | Stop reason: SDUPTHER

## 2018-11-20 RX ORDER — LEVOTHYROXINE SODIUM 0.12 MG/1
125 TABLET ORAL DAILY
Qty: 30 TABLET | Refills: 2 | Status: SHIPPED | OUTPATIENT
Start: 2018-11-20 | End: 2019-01-01 | Stop reason: SDUPTHER

## 2018-11-20 RX ORDER — AZITHROMYCIN 250 MG/1
TABLET, FILM COATED ORAL
Qty: 6 TABLET | Refills: 0 | Status: SHIPPED | OUTPATIENT
Start: 2018-11-20 | End: 2019-01-01

## 2018-11-23 LAB
THYROGLOB AB SERPL-ACNC: 2.9 IU/ML (ref 0–0.9)
THYROPEROXIDASE AB SERPL-ACNC: 7 IU/ML (ref 0–34)
TSI SER-MCNC: <0.1 IU/L (ref 0–0.55)

## 2018-12-04 NOTE — PROGRESS NOTES
"Onel MONICA Georgina     VITALS: Blood pressure 122/87, pulse 64, temperature 98.7 °F (37.1 °C), height 167.6 cm (65.98\"), weight 45 kg (99 lb 3.2 oz), SpO2 95 %.    Subjective  Chief Complaint:   Chief Complaint   Patient presents with   • Follow-up   • COPD        History of Present Illness:  Patient is a 78 y.o. male with a medical history significant for CAD and COPD who presents to clinic secondary to medical followup. No new or acute concerns.  He is doing well.     Patient also has a history of hyperlipidemia and is currently on atorvastatin 20 mg orally daily. Denies any side effects of the medications. Last lipid panel in 8/2016 and was ok. Denies any muscle weakness, jaundice or itching.   Low cholesterol diet: Yes    Patient does have a history of hypothyroidism and is currently on synthroid 175 mcg orally daily. He is doing well on this medication. Denies any side effects. Denies fatigue, dizziness, palpitations, changes in weight, hair, or nails. Last thyroid panel was in 6/2017 and was abnormal.     Patient has a history of COPD and is currently on Advair 250/50 1 puff twice daily and Spiriva daily. Denies any side effects of the medications. Occasional shortness of breath, but no other coughing, wheezing, or night coughing. Reports that in the past CXR has shown \"fungus spots on his lungs\".  Exacerbation: No  Last utilized albuterol: Does not have albuterol    Patient has a history of CAD. He sees Dr. Harley, and is currently on Aspirin 81 mg daily. He does have an extensive history, including a cath, 5 bypasses, along with a history of PAD with stents in both legs along with a left arterial bypass in his lower extremity. In the past, he was on plavix, digoxin, imdur, and lasix, but Dr. Harley has since taken him off of these medications as he has been extremely stable in the past. Last saw Dr. Harley some months ago. He quit smoking in 2012.  History of stroke: Yes  No complaints about any " of the medications.    The following portions of the patient's history were reviewed and updated as appropriate: allergies, current medications, past family history, past medical history, past social history, past surgical history and problem list.    Past Medical History  Past Medical History:   Diagnosis Date   • Arthritis    • CAD (coronary artery disease)     Sees Dr. Harley   • COPD (chronic obstructive pulmonary disease) (CMS/[facility])    • Former smoker     Stopped in 2012   • Hernia of abdominal wall    • History of TIAs    • Hyperlipidemia    • Hypertension    • Hypothyroidism    • Inguinal hernia    • PAD (peripheral artery disease) (CMS/[facility])    • Ruptured lumbar disc    • Scrotal hernia     Left   • Stroke (CMS/[facility]) 2011    10 YEARS AGO   • Ventral hernia        Review of Systems  Constitutional: Denies any recent history of HAs, dizziness, fevers, chills, itching.  Eyes: Denies any changes in vision. Denies any blurry vision or diplopia.  Ears, Nose, Mouth, Throat: Denies any sore throat, rhinorrhea, or cough.  Cardiovascular: Denies any chest pain, pressure, or palpitations.  Respiratory: Denies any shortness of breath or wheezing.  Gastrointestinal: Denies any abdominal pain, nausea, vomiting, diarrhea, or constipation.  Genitourinary: Denies any changes in urination.  Musculoskeletal: Denies any muscle weakness.  Skin and/or breasts: Denies any rashes.  Neurological: Denies any changes in balance or gait.  Psychiatric: Denies any anxiety, depression, or insomnia. Denies any suicidal or homicidal ideations.  Endocrine: Denies any heat or cold intolerance. Denies any voice changes, polydipsia, or polyuria.  Hematologic/Lymphatic: Denies any anemia or easy bruising.    Surgical History  Past Surgical History:   Procedure Laterality Date   • AMPUTATION  06/2012    left great toe amputation   • ARTERIAL BYPASS SURGERY  10/2012    left leg - groin to ankle; Dr. Browne; [facility].   • BACK SURGERY       age 30; spinal fusion   • CARDIAC SURGERY  2012    Open heart bypass; [facility]   • CAROTID STENT Bilateral    • CATARACT EXTRACTION  2016   • CORONARY ARTERY BYPASS GRAFT  2012    x5   • HERNIA REPAIR      age 35; inguinal hernia   • LUNG BIOPSY  2012       Family History  Family History   Problem Relation Age of Onset   • Alzheimer's disease Mother    • Heart disease Father        Social History  Social History     Socioeconomic History   • Marital status: Unknown     Spouse name: Not on file   • Number of children: Not on file   • Years of education: Not on file   • Highest education level: Not on file   Social Needs   • Financial resource strain: Not on file   • Food insecurity - worry: Not on file   • Food insecurity - inability: Not on file   • Transportation needs - medical: Not on file   • Transportation needs - non-medical: Not on file   Occupational History   • Not on file   Tobacco Use   • Smoking status: Former Smoker     Packs/day: 1.00     Years: 30.00     Pack years: 30.00     Types: Cigarettes     Last attempt to quit: 2006     Years since quittin.2   • Smokeless tobacco: Never Used   Substance and Sexual Activity   • Alcohol use: No   • Drug use: No   • Sexual activity: Defer   Other Topics Concern   • Not on file   Social History Narrative   • Not on file       Objective  Physical Exam  Gen: Patient in NAD. Pleasant and answers appropriately. A&Ox3.    Skin: Warm and dry with normal turgor. No purpura, rashes, or unusual pigmentation noted. Hair is normal in appearance and distribution.    HEENT: NC/AT. No lesions noted. Conjunctiva clear, sclera nonicteric. PERRL. EOMI without nystagmus or strabismus. Fundi appear benign. No hemorrhages or exudates of eyes. Auditory canals are patent bilaterally without lesions. TMs intact,  nonerythematous, nonbulging without lesions. Nasal mucosa pink, nonerythematous, and nonedematous. Frontal and maxillary sinuses are nontender.  O/P nonerythematous and moist without exudate.    Neck: Supple without lymph nodes palpated. FROM.     Lungs: CTA B/L without rales, rhonchi, crackles, or wheezes.    Heart: RRR. S1 and S2 normal. No S3 or S4. No MRGT.    Abd: Soft, nontender,nondistended. (+)BSx4 quadrants.     Extrem: No CCE. Radial pulses 2+/4 and equal B/L. FROMx4. No bone, joint, or muscle tenderness noted.    Neuro: No focal motor/sensory deficits.    Procedures    Assessment/Plan  Onel Erickson is a 78 y.o. here for medical followup.  Diagnoses and all orders for this visit:    Other hyperlipidemia  -     atorvastatin (LIPITOR) 20 MG tablet; Take 1 tablet by mouth Daily.    Other specified hypothyroidism  -     Comprehensive Metabolic Panel  -     TSH  -     T4, Free  -     Thyroid Antibodies  -     Thyroid Stimulating Immunoglobulin  -     Osmolality, Calculated; Future  -     Osmolality, Calculated  -     levothyroxine (SYNTHROID, LEVOTHROID) 125 MCG tablet; Take 1 tablet by mouth Daily.    Renal insufficiency  -     lisinopril (PRINIVIL,ZESTRIL) 10 MG tablet; Take 1 tablet by mouth Daily.    Other orders  -     azithromycin (ZITHROMAX) 250 MG tablet; Take 2 tablets the first day, then 1 tablet daily for 4 days.      Patient's Body mass index is 16.02 kg/m². BMI is within normal parameters. No follow-up required.        Findings and plans discussed with patient who verbalizes understanding and agreement. Will followup with patient once results are in. Patient to  followup at clinic PRN or in three months for further medical followup.    Deloris Lewis MD

## 2019-01-01 ENCOUNTER — TELEPHONE (OUTPATIENT)
Dept: FAMILY MEDICINE CLINIC | Facility: CLINIC | Age: 79
End: 2019-01-01

## 2019-01-01 ENCOUNTER — HOSPITAL ENCOUNTER (OUTPATIENT)
Dept: CT IMAGING | Facility: HOSPITAL | Age: 79
Discharge: HOME | End: 2019-01-17
Admitting: FAMILY MEDICINE

## 2019-01-01 ENCOUNTER — APPOINTMENT (OUTPATIENT)
Dept: CARDIOLOGY | Facility: HOSPITAL | Age: 79
End: 2019-01-01

## 2019-01-01 ENCOUNTER — TELEPHONE (OUTPATIENT)
Dept: CARDIOLOGY | Facility: CLINIC | Age: 79
End: 2019-01-01

## 2019-01-01 ENCOUNTER — OFFICE VISIT (OUTPATIENT)
Dept: CARDIOLOGY | Facility: CLINIC | Age: 79
End: 2019-01-01

## 2019-01-01 ENCOUNTER — ANESTHESIA (OUTPATIENT)
Dept: PERIOP | Facility: HOSPITAL | Age: 79
End: 2019-01-01

## 2019-01-01 ENCOUNTER — OFFICE VISIT (OUTPATIENT)
Dept: FAMILY MEDICINE CLINIC | Facility: CLINIC | Age: 79
End: 2019-01-01

## 2019-01-01 ENCOUNTER — APPOINTMENT (OUTPATIENT)
Dept: GENERAL RADIOLOGY | Facility: HOSPITAL | Age: 79
End: 2019-01-01

## 2019-01-01 ENCOUNTER — APPOINTMENT (OUTPATIENT)
Dept: ULTRASOUND IMAGING | Facility: HOSPITAL | Age: 79
End: 2019-01-01

## 2019-01-01 ENCOUNTER — PREP FOR SURGERY (OUTPATIENT)
Dept: OTHER | Facility: HOSPITAL | Age: 79
End: 2019-01-01

## 2019-01-01 ENCOUNTER — HOSPITAL ENCOUNTER (OUTPATIENT)
Facility: HOSPITAL | Age: 79
Setting detail: HOSPITAL OUTPATIENT SURGERY
Discharge: HOME | End: 2019-04-15
Attending: INTERNAL MEDICINE | Admitting: INTERNAL MEDICINE

## 2019-01-01 ENCOUNTER — HOSPITAL ENCOUNTER (INPATIENT)
Facility: HOSPITAL | Age: 79
LOS: 9 days | End: 2019-12-01
Attending: EMERGENCY MEDICINE | Admitting: INTERNAL MEDICINE

## 2019-01-01 ENCOUNTER — ANESTHESIA EVENT (OUTPATIENT)
Dept: PERIOP | Facility: HOSPITAL | Age: 79
End: 2019-01-01

## 2019-01-01 ENCOUNTER — HOSPITAL ENCOUNTER (OUTPATIENT)
Facility: HOSPITAL | Age: 79
Setting detail: HOSPITAL OUTPATIENT SURGERY
Discharge: HOME | End: 2019-04-02
Attending: PODIATRIST | Admitting: PODIATRIST

## 2019-01-01 ENCOUNTER — APPOINTMENT (OUTPATIENT)
Dept: PREADMISSION TESTING | Facility: HOSPITAL | Age: 79
End: 2019-01-01

## 2019-01-01 ENCOUNTER — HOSPITAL ENCOUNTER (OUTPATIENT)
Dept: ULTRASOUND IMAGING | Facility: HOSPITAL | Age: 79
Discharge: HOME | End: 2019-01-17

## 2019-01-01 VITALS
HEART RATE: 63 BPM | OXYGEN SATURATION: 68 % | SYSTOLIC BLOOD PRESSURE: 102 MMHG | DIASTOLIC BLOOD PRESSURE: 73 MMHG | TEMPERATURE: 95.8 F | WEIGHT: 102 LBS | BODY MASS INDEX: 15.11 KG/M2 | HEIGHT: 69 IN

## 2019-01-01 VITALS
HEIGHT: 67 IN | WEIGHT: 102 LBS | HEART RATE: 65 BPM | SYSTOLIC BLOOD PRESSURE: 110 MMHG | RESPIRATION RATE: 18 BRPM | DIASTOLIC BLOOD PRESSURE: 75 MMHG | OXYGEN SATURATION: 98 % | TEMPERATURE: 98 F | BODY MASS INDEX: 16.01 KG/M2

## 2019-01-01 VITALS
DIASTOLIC BLOOD PRESSURE: 80 MMHG | TEMPERATURE: 98 F | WEIGHT: 96.6 LBS | OXYGEN SATURATION: 91 % | BODY MASS INDEX: 14.31 KG/M2 | SYSTOLIC BLOOD PRESSURE: 110 MMHG | HEIGHT: 69 IN | HEART RATE: 49 BPM

## 2019-01-01 VITALS — HEART RATE: 69 BPM | OXYGEN SATURATION: 94 % | WEIGHT: 102 LBS | HEIGHT: 67 IN | BODY MASS INDEX: 16.01 KG/M2

## 2019-01-01 VITALS
TEMPERATURE: 98.5 F | WEIGHT: 100 LBS | OXYGEN SATURATION: 98 % | DIASTOLIC BLOOD PRESSURE: 69 MMHG | SYSTOLIC BLOOD PRESSURE: 100 MMHG | HEART RATE: 73 BPM | HEIGHT: 69 IN | BODY MASS INDEX: 14.81 KG/M2

## 2019-01-01 VITALS
SYSTOLIC BLOOD PRESSURE: 110 MMHG | BODY MASS INDEX: 15.46 KG/M2 | WEIGHT: 102 LBS | HEIGHT: 68 IN | HEART RATE: 85 BPM | DIASTOLIC BLOOD PRESSURE: 68 MMHG

## 2019-01-01 VITALS
BODY MASS INDEX: 15.11 KG/M2 | OXYGEN SATURATION: 96 % | HEART RATE: 84 BPM | HEIGHT: 69 IN | WEIGHT: 102 LBS | DIASTOLIC BLOOD PRESSURE: 70 MMHG | RESPIRATION RATE: 17 BRPM | SYSTOLIC BLOOD PRESSURE: 116 MMHG

## 2019-01-01 VITALS
SYSTOLIC BLOOD PRESSURE: 87 MMHG | WEIGHT: 96.4 LBS | HEART RATE: 56 BPM | DIASTOLIC BLOOD PRESSURE: 57 MMHG | BODY MASS INDEX: 14.28 KG/M2 | HEIGHT: 69 IN

## 2019-01-01 VITALS
HEART RATE: 53 BPM | DIASTOLIC BLOOD PRESSURE: 71 MMHG | OXYGEN SATURATION: 97 % | WEIGHT: 104 LBS | HEIGHT: 69 IN | BODY MASS INDEX: 15.4 KG/M2 | SYSTOLIC BLOOD PRESSURE: 110 MMHG

## 2019-01-01 VITALS
BODY MASS INDEX: 16.22 KG/M2 | HEART RATE: 68 BPM | HEIGHT: 68 IN | OXYGEN SATURATION: 95 % | SYSTOLIC BLOOD PRESSURE: 151 MMHG | WEIGHT: 107 LBS | DIASTOLIC BLOOD PRESSURE: 82 MMHG | TEMPERATURE: 97.5 F

## 2019-01-01 VITALS
HEART RATE: 40 BPM | SYSTOLIC BLOOD PRESSURE: 76 MMHG | WEIGHT: 76.9 LBS | BODY MASS INDEX: 10.19 KG/M2 | RESPIRATION RATE: 10 BRPM | OXYGEN SATURATION: 90 % | HEIGHT: 73 IN | DIASTOLIC BLOOD PRESSURE: 45 MMHG | TEMPERATURE: 97.1 F

## 2019-01-01 VITALS
SYSTOLIC BLOOD PRESSURE: 109 MMHG | OXYGEN SATURATION: 91 % | WEIGHT: 102.7 LBS | HEART RATE: 60 BPM | BODY MASS INDEX: 16.12 KG/M2 | DIASTOLIC BLOOD PRESSURE: 64 MMHG | HEIGHT: 67 IN

## 2019-01-01 DIAGNOSIS — I10 ESSENTIAL HYPERTENSION: ICD-10-CM

## 2019-01-01 DIAGNOSIS — I73.9 PVD (PERIPHERAL VASCULAR DISEASE) (HCC): Primary | ICD-10-CM

## 2019-01-01 DIAGNOSIS — I77.819 WIDENING AORTA (HCC): ICD-10-CM

## 2019-01-01 DIAGNOSIS — Z89.429 HISTORY OF AMPUTATION OF TOE (HCC): ICD-10-CM

## 2019-01-01 DIAGNOSIS — J43.8 OTHER EMPHYSEMA (HCC): ICD-10-CM

## 2019-01-01 DIAGNOSIS — R07.89 OTHER CHEST PAIN: ICD-10-CM

## 2019-01-01 DIAGNOSIS — E03.8 OTHER SPECIFIED HYPOTHYROIDISM: ICD-10-CM

## 2019-01-01 DIAGNOSIS — I25.119 CORONARY ARTERY DISEASE INVOLVING NATIVE HEART WITH ANGINA PECTORIS, UNSPECIFIED VESSEL OR LESION TYPE (HCC): ICD-10-CM

## 2019-01-01 DIAGNOSIS — M86.272 SUBACUTE OSTEOMYELITIS OF LEFT FOOT (HCC): ICD-10-CM

## 2019-01-01 DIAGNOSIS — E78.2 MIXED HYPERLIPIDEMIA: ICD-10-CM

## 2019-01-01 DIAGNOSIS — I73.9 PVD (PERIPHERAL VASCULAR DISEASE) WITH CLAUDICATION (HCC): ICD-10-CM

## 2019-01-01 DIAGNOSIS — R00.1 BRADYCARDIA: ICD-10-CM

## 2019-01-01 DIAGNOSIS — R29.6 MULTIPLE FALLS: ICD-10-CM

## 2019-01-01 DIAGNOSIS — J18.9 COMMUNITY ACQUIRED PNEUMONIA OF RIGHT LOWER LOBE OF LUNG: ICD-10-CM

## 2019-01-01 DIAGNOSIS — I70.229 CRITICAL LOWER LIMB ISCHEMIA (HCC): Primary | ICD-10-CM

## 2019-01-01 DIAGNOSIS — R06.02 SHORTNESS OF BREATH: Primary | ICD-10-CM

## 2019-01-01 DIAGNOSIS — M86.672 OTHER CHRONIC OSTEOMYELITIS OF LEFT FOOT (HCC): Primary | ICD-10-CM

## 2019-01-01 DIAGNOSIS — E78.49 OTHER HYPERLIPIDEMIA: ICD-10-CM

## 2019-01-01 DIAGNOSIS — I71.9 AORTIC ANEURYSM WITHOUT RUPTURE, UNSPECIFIED PORTION OF AORTA (HCC): ICD-10-CM

## 2019-01-01 DIAGNOSIS — E03.8 OTHER SPECIFIED HYPOTHYROIDISM: Primary | ICD-10-CM

## 2019-01-01 DIAGNOSIS — J44.1 ACUTE EXACERBATION OF CHRONIC OBSTRUCTIVE PULMONARY DISEASE (COPD) (HCC): ICD-10-CM

## 2019-01-01 DIAGNOSIS — I73.9 PAD (PERIPHERAL ARTERY DISEASE) (HCC): Primary | ICD-10-CM

## 2019-01-01 DIAGNOSIS — M86.672 OTHER CHRONIC OSTEOMYELITIS OF LEFT FOOT (HCC): ICD-10-CM

## 2019-01-01 DIAGNOSIS — N28.9 RENAL INSUFFICIENCY: ICD-10-CM

## 2019-01-01 DIAGNOSIS — I73.9 PAD (PERIPHERAL ARTERY DISEASE) (HCC): ICD-10-CM

## 2019-01-01 DIAGNOSIS — K86.1 OTHER CHRONIC PANCREATITIS (HCC): ICD-10-CM

## 2019-01-01 DIAGNOSIS — I25.10 CORONARY ARTERY DISEASE INVOLVING NATIVE CORONARY ARTERY OF NATIVE HEART WITHOUT ANGINA PECTORIS: ICD-10-CM

## 2019-01-01 DIAGNOSIS — Z00.00 MEDICARE ANNUAL WELLNESS VISIT, SUBSEQUENT: Primary | ICD-10-CM

## 2019-01-01 DIAGNOSIS — M86.272 SUBACUTE OSTEOMYELITIS OF LEFT FOOT (HCC): Primary | ICD-10-CM

## 2019-01-01 DIAGNOSIS — L98.499 CHRONIC SKIN ULCER, UNSPECIFIED ULCER STAGE (HCC): ICD-10-CM

## 2019-01-01 DIAGNOSIS — R74.8 ELEVATED LIVER ENZYMES: ICD-10-CM

## 2019-01-01 DIAGNOSIS — N28.9 RENAL INSUFFICIENCY: Primary | ICD-10-CM

## 2019-01-01 DIAGNOSIS — D69.6 THROMBOCYTOPENIA (HCC): ICD-10-CM

## 2019-01-01 DIAGNOSIS — I73.9 PERIPHERAL ARTERIAL DISEASE (HCC): ICD-10-CM

## 2019-01-01 DIAGNOSIS — J96.01 ACUTE RESPIRATORY FAILURE WITH HYPOXIA (HCC): Primary | ICD-10-CM

## 2019-01-01 LAB
A-A DO2: 43 MMHG (ref 0–300)
A-A DO2: 49.9 MMHG (ref 0–300)
ALBUMIN SERPL-MCNC: 3.27 G/DL (ref 3.5–5.2)
ALBUMIN SERPL-MCNC: 3.8 G/DL (ref 3.5–5.2)
ALBUMIN SERPL-MCNC: 4.2 G/DL (ref 3.4–4.8)
ALBUMIN SERPL-MCNC: 4.26 G/DL (ref 3.5–5.2)
ALBUMIN SERPL-MCNC: 4.3 G/DL (ref 3.5–5.2)
ALBUMIN SERPL-MCNC: 4.4 G/DL (ref 3.5–5.2)
ALBUMIN SERPL-MCNC: 4.5 G/DL (ref 3.5–5.2)
ALBUMIN/GLOB SERPL: 1.2 G/DL
ALBUMIN/GLOB SERPL: 1.2 G/DL
ALBUMIN/GLOB SERPL: 1.3 G/DL
ALBUMIN/GLOB SERPL: 1.4 G/DL
ALBUMIN/GLOB SERPL: 1.4 G/DL (ref 1.5–2.5)
ALBUMIN/GLOB SERPL: 1.5 G/DL
ALBUMIN/GLOB SERPL: 1.7 G/DL
ALP SERPL-CCNC: 120 U/L (ref 39–117)
ALP SERPL-CCNC: 124 U/L (ref 39–117)
ALP SERPL-CCNC: 74 U/L (ref 39–117)
ALP SERPL-CCNC: 75 U/L (ref 39–117)
ALP SERPL-CCNC: 85 U/L (ref 40–129)
ALP SERPL-CCNC: 94 U/L (ref 39–117)
ALP SERPL-CCNC: 97 U/L (ref 39–117)
ALT SERPL W P-5'-P-CCNC: 11 U/L (ref 1–41)
ALT SERPL W P-5'-P-CCNC: 14 U/L (ref 1–41)
ALT SERPL W P-5'-P-CCNC: 14 U/L (ref 1–41)
ALT SERPL W P-5'-P-CCNC: 18 U/L (ref 1–41)
ALT SERPL W P-5'-P-CCNC: 23 U/L (ref 10–44)
ALT SERPL W P-5'-P-CCNC: 44 U/L (ref 1–41)
ALT SERPL W P-5'-P-CCNC: 69 U/L (ref 1–41)
ANION GAP SERPL CALCULATED.3IONS-SCNC: 11 MMOL/L (ref 5–15)
ANION GAP SERPL CALCULATED.3IONS-SCNC: 11.5 MMOL/L
ANION GAP SERPL CALCULATED.3IONS-SCNC: 11.6 MMOL/L (ref 5–15)
ANION GAP SERPL CALCULATED.3IONS-SCNC: 11.6 MMOL/L (ref 5–15)
ANION GAP SERPL CALCULATED.3IONS-SCNC: 11.7 MMOL/L (ref 5–15)
ANION GAP SERPL CALCULATED.3IONS-SCNC: 12.9 MMOL/L
ANION GAP SERPL CALCULATED.3IONS-SCNC: 13.7 MMOL/L (ref 5–15)
ANION GAP SERPL CALCULATED.3IONS-SCNC: 13.8 MMOL/L
ANION GAP SERPL CALCULATED.3IONS-SCNC: 3.7 MMOL/L (ref 3.6–11.2)
ANISOCYTOSIS BLD QL: NORMAL
ARTERIAL PATENCY WRIST A: ABNORMAL
ARTERIAL PATENCY WRIST A: POSITIVE
AST SERPL-CCNC: 17 U/L (ref 1–40)
AST SERPL-CCNC: 20 U/L (ref 1–40)
AST SERPL-CCNC: 23 U/L (ref 1–40)
AST SERPL-CCNC: 24 U/L (ref 1–40)
AST SERPL-CCNC: 30 U/L (ref 10–34)
AST SERPL-CCNC: 45 U/L (ref 1–40)
AST SERPL-CCNC: 64 U/L (ref 1–40)
ATMOSPHERIC PRESS: 728 MMHG
ATMOSPHERIC PRESS: 729 MMHG
BACTERIA SPEC AEROBE CULT: NORMAL
BACTERIA SPEC AEROBE CULT: NORMAL
BACTERIA UR QL AUTO: NORMAL /HPF
BASE EXCESS BLDA CALC-SCNC: 1.3 MMOL/L (ref 0–2)
BASE EXCESS BLDA CALC-SCNC: 2 MMOL/L (ref 0–2)
BASOPHILS # BLD AUTO: 0.01 10*3/MM3 (ref 0–0.2)
BASOPHILS # BLD AUTO: 0.02 10*3/MM3 (ref 0–0.2)
BASOPHILS # BLD AUTO: 0.02 10*3/MM3 (ref 0–0.3)
BASOPHILS # BLD AUTO: 0.03 10*3/MM3 (ref 0–0.2)
BASOPHILS # BLD AUTO: 0.03 10*3/MM3 (ref 0–0.2)
BASOPHILS # BLD AUTO: 0.04 10*3/MM3 (ref 0–0.2)
BASOPHILS # BLD AUTO: 0.07 10*3/MM3 (ref 0–0.2)
BASOPHILS NFR BLD AUTO: 0.1 % (ref 0–1.5)
BASOPHILS NFR BLD AUTO: 0.3 % (ref 0–1.5)
BASOPHILS NFR BLD AUTO: 0.3 % (ref 0–1.5)
BASOPHILS NFR BLD AUTO: 0.3 % (ref 0–2)
BASOPHILS NFR BLD AUTO: 0.4 % (ref 0–1.5)
BASOPHILS NFR BLD AUTO: 0.7 % (ref 0–1.5)
BASOPHILS NFR BLD AUTO: 1.1 % (ref 0–1.5)
BDY SITE: ABNORMAL
BDY SITE: ABNORMAL
BH CV ECHO MEAS - ACS: 0.93 CM
BH CV ECHO MEAS - AO MAX PG: 9.1 MMHG
BH CV ECHO MEAS - AO MEAN PG: 4.7 MMHG
BH CV ECHO MEAS - AO ROOT AREA (BSA CORRECTED): 1.8
BH CV ECHO MEAS - AO ROOT AREA: 6.3 CM^2
BH CV ECHO MEAS - AO ROOT DIAM: 2.8 CM
BH CV ECHO MEAS - AO V2 MAX: 150.7 CM/SEC
BH CV ECHO MEAS - AO V2 MEAN: 100.8 CM/SEC
BH CV ECHO MEAS - AO V2 VTI: 25.2 CM
BH CV ECHO MEAS - BSA(HAYCOCK): 1.5 M^2
BH CV ECHO MEAS - BSA: 1.6 M^2
BH CV ECHO MEAS - BZI_BMI: 15.1 KILOGRAMS/M^2
BH CV ECHO MEAS - BZI_METRIC_HEIGHT: 175.3 CM
BH CV ECHO MEAS - BZI_METRIC_WEIGHT: 46.3 KG
BH CV ECHO MEAS - EDV(CUBED): 113.1 ML
BH CV ECHO MEAS - EDV(MOD-SP4): 99 ML
BH CV ECHO MEAS - EDV(TEICH): 109.4 ML
BH CV ECHO MEAS - EF(CUBED): 48.1 %
BH CV ECHO MEAS - EF(MOD-SP4): 27.3 %
BH CV ECHO MEAS - EF(TEICH): 40.2 %
BH CV ECHO MEAS - ESV(CUBED): 58.7 ML
BH CV ECHO MEAS - ESV(MOD-SP4): 72 ML
BH CV ECHO MEAS - ESV(TEICH): 65.4 ML
BH CV ECHO MEAS - FS: 19.6 %
BH CV ECHO MEAS - IVS/LVPW: 0.96
BH CV ECHO MEAS - IVSD: 0.99 CM
BH CV ECHO MEAS - LA DIMENSION: 3.3 CM
BH CV ECHO MEAS - LA/AO: 1.2
BH CV ECHO MEAS - LV DIASTOLIC VOL/BSA (35-75): 63.8 ML/M^2
BH CV ECHO MEAS - LV MASS(C)D: 175.3 GRAMS
BH CV ECHO MEAS - LV MASS(C)DI: 113 GRAMS/M^2
BH CV ECHO MEAS - LV SYSTOLIC VOL/BSA (12-30): 46.4 ML/M^2
BH CV ECHO MEAS - LVIDD: 4.8 CM
BH CV ECHO MEAS - LVIDS: 3.9 CM
BH CV ECHO MEAS - LVLD AP4: 9.9 CM
BH CV ECHO MEAS - LVLS AP4: 9.1 CM
BH CV ECHO MEAS - LVOT AREA (M): 2.3 CM^2
BH CV ECHO MEAS - LVOT AREA: 2.3 CM^2
BH CV ECHO MEAS - LVOT DIAM: 1.7 CM
BH CV ECHO MEAS - LVPWD: 1 CM
BH CV ECHO MEAS - MV A MAX VEL: 108.6 CM/SEC
BH CV ECHO MEAS - MV E MAX VEL: 85.9 CM/SEC
BH CV ECHO MEAS - MV E/A: 0.79
BH CV ECHO MEAS - PA ACC SLOPE: 1470 CM/SEC^2
BH CV ECHO MEAS - PA ACC TIME: 0.06 SEC
BH CV ECHO MEAS - PA PR(ACCEL): 53.6 MMHG
BH CV ECHO MEAS - RAP SYSTOLE: 10 MMHG
BH CV ECHO MEAS - RVSP: 54.6 MMHG
BH CV ECHO MEAS - SI(AO): 102.2 ML/M^2
BH CV ECHO MEAS - SI(CUBED): 35 ML/M^2
BH CV ECHO MEAS - SI(MOD-SP4): 17.4 ML/M^2
BH CV ECHO MEAS - SI(TEICH): 28.4 ML/M^2
BH CV ECHO MEAS - SV(AO): 158.6 ML
BH CV ECHO MEAS - SV(CUBED): 54.4 ML
BH CV ECHO MEAS - SV(MOD-SP4): 27 ML
BH CV ECHO MEAS - SV(TEICH): 44 ML
BH CV ECHO MEAS - TR MAX VEL: 333.9 CM/SEC
BILIRUB SERPL-MCNC: 0.5 MG/DL (ref 0.2–1.2)
BILIRUB SERPL-MCNC: 0.5 MG/DL (ref 0.2–1.2)
BILIRUB SERPL-MCNC: 0.5 MG/DL (ref 0.2–1.8)
BILIRUB SERPL-MCNC: 0.6 MG/DL (ref 0.2–1.2)
BILIRUB SERPL-MCNC: 0.7 MG/DL (ref 0.2–1.2)
BILIRUB SERPL-MCNC: 0.7 MG/DL (ref 0.2–1.2)
BILIRUB SERPL-MCNC: 0.9 MG/DL (ref 0.2–1.2)
BILIRUB UR QL STRIP: NEGATIVE
BILIRUB UR QL STRIP: NEGATIVE
BODY TEMPERATURE: 0 C
BODY TEMPERATURE: 0 C
BUN BLD-MCNC: 22 MG/DL (ref 8–23)
BUN BLD-MCNC: 24 MG/DL (ref 7–21)
BUN BLD-MCNC: 27 MG/DL (ref 8–23)
BUN BLD-MCNC: 28 MG/DL (ref 8–23)
BUN BLD-MCNC: 34 MG/DL (ref 8–23)
BUN BLD-MCNC: 35 MG/DL (ref 8–23)
BUN BLD-MCNC: 45 MG/DL (ref 8–23)
BUN BLD-MCNC: 46 MG/DL (ref 8–23)
BUN BLD-MCNC: 48 MG/DL (ref 8–23)
BUN/CREAT SERPL: 18 (ref 7–25)
BUN/CREAT SERPL: 18.8 (ref 7–25)
BUN/CREAT SERPL: 19.2 (ref 7–25)
BUN/CREAT SERPL: 21.4 (ref 7–25)
BUN/CREAT SERPL: 21.7 (ref 7–25)
BUN/CREAT SERPL: 24.5 (ref 7–25)
BUN/CREAT SERPL: 29 (ref 7–25)
BUN/CREAT SERPL: 29.7 (ref 7–25)
BUN/CREAT SERPL: 32.9 (ref 7–25)
CALCIUM SPEC-SCNC: 8.7 MG/DL (ref 8.6–10.5)
CALCIUM SPEC-SCNC: 8.9 MG/DL (ref 8.6–10.5)
CALCIUM SPEC-SCNC: 8.9 MG/DL (ref 8.6–10.5)
CALCIUM SPEC-SCNC: 9 MG/DL (ref 8.6–10.5)
CALCIUM SPEC-SCNC: 9.2 MG/DL (ref 8.6–10.5)
CALCIUM SPEC-SCNC: 9.3 MG/DL (ref 8.6–10.5)
CALCIUM SPEC-SCNC: 9.4 MG/DL (ref 7.7–10)
CALCIUM SPEC-SCNC: 9.5 MG/DL (ref 8.6–10.5)
CALCIUM SPEC-SCNC: 9.7 MG/DL (ref 8.6–10.5)
CHLORIDE SERPL-SCNC: 100 MMOL/L (ref 98–107)
CHLORIDE SERPL-SCNC: 104 MMOL/L (ref 99–112)
CHLORIDE SERPL-SCNC: 94 MMOL/L (ref 98–107)
CHLORIDE SERPL-SCNC: 95 MMOL/L (ref 98–107)
CHLORIDE SERPL-SCNC: 97 MMOL/L (ref 98–107)
CHOLEST SERPL-MCNC: 156 MG/DL (ref 0–200)
CK MB SERPL-CCNC: 3.13 NG/ML
CLARITY UR: CLEAR
CLARITY UR: CLEAR
CO2 BLDA-SCNC: 29.6 MMOL/L (ref 22–33)
CO2 BLDA-SCNC: 29.8 MMOL/L (ref 22–33)
CO2 SERPL-SCNC: 24.2 MMOL/L (ref 22–29)
CO2 SERPL-SCNC: 24.4 MMOL/L (ref 22–29)
CO2 SERPL-SCNC: 26.1 MMOL/L (ref 22–29)
CO2 SERPL-SCNC: 26.5 MMOL/L (ref 22–29)
CO2 SERPL-SCNC: 27.3 MMOL/L (ref 22–29)
CO2 SERPL-SCNC: 29.4 MMOL/L (ref 22–29)
CO2 SERPL-SCNC: 30 MMOL/L (ref 22–29)
CO2 SERPL-SCNC: 30.3 MMOL/L (ref 22–29)
CO2 SERPL-SCNC: 31.3 MMOL/L (ref 24.3–31.9)
COHGB MFR BLD: 2 % (ref 0–5)
COHGB MFR BLD: 2.1 % (ref 0–5)
COLOR UR: YELLOW
COLOR UR: YELLOW
CREAT BLD-MCNC: 1.22 MG/DL (ref 0.76–1.27)
CREAT BLD-MCNC: 1.25 MG/DL (ref 0.43–1.29)
CREAT BLD-MCNC: 1.29 MG/DL (ref 0.76–1.27)
CREAT BLD-MCNC: 1.43 MG/DL (ref 0.76–1.27)
CREAT BLD-MCNC: 1.44 MG/DL (ref 0.76–1.27)
CREAT BLD-MCNC: 1.46 MG/DL (ref 0.76–1.27)
CREAT BLD-MCNC: 1.55 MG/DL (ref 0.76–1.27)
CREAT BLD-MCNC: 1.55 MG/DL (ref 0.76–1.27)
CREAT BLD-MCNC: 1.59 MG/DL (ref 0.76–1.27)
CRP SERPL-MCNC: 1.44 MG/DL (ref 0–0.5)
D-LACTATE SERPL-SCNC: 2 MMOL/L (ref 0.5–2)
DEPRECATED RDW RBC AUTO: 44.9 FL (ref 37–54)
DEPRECATED RDW RBC AUTO: 51.1 FL (ref 37–54)
DEPRECATED RDW RBC AUTO: 53.2 FL (ref 37–54)
DEPRECATED RDW RBC AUTO: 56.5 FL (ref 37–54)
DEPRECATED RDW RBC AUTO: 65.4 FL (ref 37–54)
DEPRECATED RDW RBC AUTO: 66.4 FL (ref 37–54)
DEPRECATED RDW RBC AUTO: 66.6 FL (ref 37–54)
DEPRECATED RDW RBC AUTO: 67.8 FL (ref 37–54)
DEPRECATED RDW RBC AUTO: 68.1 FL (ref 37–54)
EOSINOPHIL # BLD AUTO: 0 10*3/MM3 (ref 0–0.4)
EOSINOPHIL # BLD AUTO: 0.02 10*3/MM3 (ref 0–0.4)
EOSINOPHIL # BLD AUTO: 0.05 10*3/MM3 (ref 0–0.4)
EOSINOPHIL # BLD AUTO: 0.09 10*3/MM3 (ref 0–0.4)
EOSINOPHIL # BLD AUTO: 0.11 10*3/MM3 (ref 0–0.4)
EOSINOPHIL # BLD AUTO: 0.15 10*3/MM3 (ref 0–0.4)
EOSINOPHIL # BLD AUTO: 0.19 10*3/MM3 (ref 0–0.4)
EOSINOPHIL # BLD AUTO: 0.34 10*3/MM3 (ref 0–0.7)
EOSINOPHIL # BLD AUTO: 0.36 10*3/MM3 (ref 0–0.4)
EOSINOPHIL NFR BLD AUTO: 0 % (ref 0.3–6.2)
EOSINOPHIL NFR BLD AUTO: 0.3 % (ref 0.3–6.2)
EOSINOPHIL NFR BLD AUTO: 0.4 % (ref 0.3–6.2)
EOSINOPHIL NFR BLD AUTO: 1 % (ref 0.3–6.2)
EOSINOPHIL NFR BLD AUTO: 1.2 % (ref 0.3–6.2)
EOSINOPHIL NFR BLD AUTO: 2.5 % (ref 0.3–6.2)
EOSINOPHIL NFR BLD AUTO: 2.9 % (ref 0.3–6.2)
EOSINOPHIL NFR BLD AUTO: 4.5 % (ref 0.3–6.2)
EOSINOPHIL NFR BLD AUTO: 4.5 % (ref 0–7)
ERYTHROCYTE [DISTWIDTH] IN BLOOD BY AUTOMATED COUNT: 12.4 % (ref 12.3–15.4)
ERYTHROCYTE [DISTWIDTH] IN BLOOD BY AUTOMATED COUNT: 13.6 % (ref 12.3–15.4)
ERYTHROCYTE [DISTWIDTH] IN BLOOD BY AUTOMATED COUNT: 14.2 % (ref 12.3–15.4)
ERYTHROCYTE [DISTWIDTH] IN BLOOD BY AUTOMATED COUNT: 15.5 % (ref 11.5–14.5)
ERYTHROCYTE [DISTWIDTH] IN BLOOD BY AUTOMATED COUNT: 18 % (ref 12.3–15.4)
ERYTHROCYTE [DISTWIDTH] IN BLOOD BY AUTOMATED COUNT: 18.1 % (ref 12.3–15.4)
ERYTHROCYTE [DISTWIDTH] IN BLOOD BY AUTOMATED COUNT: 18.3 % (ref 12.3–15.4)
ERYTHROCYTE [DISTWIDTH] IN BLOOD BY AUTOMATED COUNT: 18.3 % (ref 12.3–15.4)
ERYTHROCYTE [DISTWIDTH] IN BLOOD BY AUTOMATED COUNT: 18.4 % (ref 12.3–15.4)
FOLATE SERPL-MCNC: >20 NG/ML (ref 4.78–24.2)
GFR SERPL CREATININE-BSD FRML MDRD: 42 ML/MIN/1.73
GFR SERPL CREATININE-BSD FRML MDRD: 43 ML/MIN/1.73
GFR SERPL CREATININE-BSD FRML MDRD: 43 ML/MIN/1.73
GFR SERPL CREATININE-BSD FRML MDRD: 47 ML/MIN/1.73
GFR SERPL CREATININE-BSD FRML MDRD: 47 ML/MIN/1.73
GFR SERPL CREATININE-BSD FRML MDRD: 48 ML/MIN/1.73
GFR SERPL CREATININE-BSD FRML MDRD: 54 ML/MIN/1.73
GFR SERPL CREATININE-BSD FRML MDRD: 56 ML/MIN/1.73
GFR SERPL CREATININE-BSD FRML MDRD: 57 ML/MIN/1.73
GLOBULIN UR ELPH-MCNC: 2.6 GM/DL
GLOBULIN UR ELPH-MCNC: 2.7 GM/DL
GLOBULIN UR ELPH-MCNC: 2.8 GM/DL
GLOBULIN UR ELPH-MCNC: 2.9 GM/DL
GLOBULIN UR ELPH-MCNC: 3 GM/DL
GLOBULIN UR ELPH-MCNC: 3.2 GM/DL
GLOBULIN UR ELPH-MCNC: 3.6 GM/DL
GLUCOSE BLD-MCNC: 111 MG/DL (ref 65–99)
GLUCOSE BLD-MCNC: 113 MG/DL (ref 65–99)
GLUCOSE BLD-MCNC: 77 MG/DL (ref 65–99)
GLUCOSE BLD-MCNC: 83 MG/DL (ref 65–99)
GLUCOSE BLD-MCNC: 84 MG/DL (ref 65–99)
GLUCOSE BLD-MCNC: 84 MG/DL (ref 65–99)
GLUCOSE BLD-MCNC: 85 MG/DL (ref 65–99)
GLUCOSE BLD-MCNC: 91 MG/DL (ref 70–110)
GLUCOSE BLD-MCNC: 94 MG/DL (ref 65–99)
GLUCOSE UR STRIP-MCNC: NEGATIVE MG/DL
GLUCOSE UR STRIP-MCNC: NEGATIVE MG/DL
HAV IGM SERPL QL IA: NORMAL
HBV CORE IGM SERPL QL IA: NORMAL
HBV SURFACE AG SERPL QL IA: NORMAL
HCO3 BLDA-SCNC: 28 MMOL/L (ref 20–26)
HCO3 BLDA-SCNC: 28.3 MMOL/L (ref 20–26)
HCT VFR BLD AUTO: 38 % (ref 37.5–51)
HCT VFR BLD AUTO: 38.8 % (ref 37.5–51)
HCT VFR BLD AUTO: 39.9 % (ref 42–52)
HCT VFR BLD AUTO: 41 % (ref 37.5–51)
HCT VFR BLD AUTO: 41.4 % (ref 37.5–51)
HCT VFR BLD AUTO: 42.5 % (ref 37.5–51)
HCT VFR BLD AUTO: 43.2 % (ref 37.5–51)
HCT VFR BLD AUTO: 43.5 % (ref 37.5–51)
HCT VFR BLD AUTO: 43.9 % (ref 37.5–51)
HCT VFR BLD CALC: 39.9 % (ref 38–51)
HCT VFR BLD CALC: 41.1 % (ref 38–51)
HCV AB SER DONR QL: NORMAL
HDLC SERPL-MCNC: 61 MG/DL (ref 40–60)
HGB BLD-MCNC: 12.2 G/DL (ref 13–17.7)
HGB BLD-MCNC: 12.5 G/DL (ref 13–17.7)
HGB BLD-MCNC: 12.8 G/DL (ref 14–18)
HGB BLD-MCNC: 12.9 G/DL (ref 13–17.7)
HGB BLD-MCNC: 13.1 G/DL (ref 13–17.7)
HGB BLD-MCNC: 13.3 G/DL (ref 13–17.7)
HGB BLD-MCNC: 13.7 G/DL (ref 13–17.7)
HGB BLD-MCNC: 13.8 G/DL (ref 13–17.7)
HGB BLD-MCNC: 14 G/DL (ref 13–17.7)
HGB BLDA-MCNC: 13 G/DL (ref 14–18)
HGB BLDA-MCNC: 13.4 G/DL (ref 14–18)
HGB UR QL STRIP.AUTO: ABNORMAL
HGB UR QL STRIP.AUTO: NEGATIVE
HOROWITZ INDEX BLD+IHG-RTO: 21 %
HOROWITZ INDEX BLD+IHG-RTO: 26 %
HYALINE CASTS UR QL AUTO: NORMAL /LPF
HYPOCHROMIA BLD QL: NORMAL
IMM GRANULOCYTES # BLD AUTO: 0.01 10*3/MM3 (ref 0–0.03)
IMM GRANULOCYTES # BLD AUTO: 0.01 10*3/MM3 (ref 0–0.05)
IMM GRANULOCYTES # BLD AUTO: 0.01 10*3/MM3 (ref 0–0.05)
IMM GRANULOCYTES # BLD AUTO: 0.02 10*3/MM3 (ref 0–0.05)
IMM GRANULOCYTES # BLD AUTO: 0.02 10*3/MM3 (ref 0–0.05)
IMM GRANULOCYTES # BLD AUTO: 0.03 10*3/MM3 (ref 0–0.05)
IMM GRANULOCYTES # BLD AUTO: 0.04 10*3/MM3 (ref 0–0.05)
IMM GRANULOCYTES # BLD AUTO: 0.05 10*3/MM3 (ref 0–0.05)
IMM GRANULOCYTES # BLD AUTO: 0.07 10*3/MM3 (ref 0–0.05)
IMM GRANULOCYTES NFR BLD AUTO: 0.1 % (ref 0–0.5)
IMM GRANULOCYTES NFR BLD AUTO: 0.3 % (ref 0–0.5)
IMM GRANULOCYTES NFR BLD AUTO: 0.3 % (ref 0–0.5)
IMM GRANULOCYTES NFR BLD AUTO: 0.4 % (ref 0–0.5)
IMM GRANULOCYTES NFR BLD AUTO: 0.5 % (ref 0–0.5)
INR PPP: 1.1 (ref 0.9–1.1)
KETONES UR QL STRIP: NEGATIVE
KETONES UR QL STRIP: NEGATIVE
LAB AP CASE REPORT: NORMAL
LARGE PLATELETS: NORMAL
LDLC SERPL CALC-MCNC: 83 MG/DL (ref 0–100)
LDLC/HDLC SERPL: 1.35 {RATIO}
LEUKOCYTE ESTERASE UR QL STRIP.AUTO: NEGATIVE
LEUKOCYTE ESTERASE UR QL STRIP.AUTO: NEGATIVE
LYMPHOCYTES # BLD AUTO: 0.34 10*3/MM3 (ref 0.7–3.1)
LYMPHOCYTES # BLD AUTO: 0.68 10*3/MM3 (ref 0.7–3.1)
LYMPHOCYTES # BLD AUTO: 0.68 10*3/MM3 (ref 0.7–3.1)
LYMPHOCYTES # BLD AUTO: 0.73 10*3/MM3 (ref 0.7–3.1)
LYMPHOCYTES # BLD AUTO: 0.79 10*3/MM3 (ref 0.7–3.1)
LYMPHOCYTES # BLD AUTO: 0.81 10*3/MM3 (ref 0.7–3.1)
LYMPHOCYTES # BLD AUTO: 0.87 10*3/MM3 (ref 1–3)
LYMPHOCYTES # BLD AUTO: 1.06 10*3/MM3 (ref 0.7–3.1)
LYMPHOCYTES # BLD AUTO: 1.08 10*3/MM3 (ref 0.7–3.1)
LYMPHOCYTES NFR BLD AUTO: 11.6 % (ref 16–46)
LYMPHOCYTES NFR BLD AUTO: 12.5 % (ref 19.6–45.3)
LYMPHOCYTES NFR BLD AUTO: 13 % (ref 19.6–45.3)
LYMPHOCYTES NFR BLD AUTO: 13.3 % (ref 19.6–45.3)
LYMPHOCYTES NFR BLD AUTO: 4.4 % (ref 19.6–45.3)
LYMPHOCYTES NFR BLD AUTO: 5.1 % (ref 19.6–45.3)
LYMPHOCYTES NFR BLD AUTO: 7.5 % (ref 19.6–45.3)
LYMPHOCYTES NFR BLD AUTO: 8.2 % (ref 19.6–45.3)
LYMPHOCYTES NFR BLD AUTO: 8.4 % (ref 19.6–45.3)
Lab: ABNORMAL
MACROCYTES BLD QL SMEAR: NORMAL
MAGNESIUM SERPL-MCNC: 2 MG/DL (ref 1.6–2.4)
MAGNESIUM SERPL-MCNC: 2 MG/DL (ref 1.6–2.4)
MAGNESIUM SERPL-MCNC: 2.1 MG/DL (ref 1.6–2.4)
MAGNESIUM SERPL-MCNC: 2.3 MG/DL (ref 1.6–2.4)
MCH RBC QN AUTO: 32 PG (ref 27–33)
MCH RBC QN AUTO: 32.2 PG (ref 26.6–33)
MCH RBC QN AUTO: 32.3 PG (ref 26.6–33)
MCH RBC QN AUTO: 32.4 PG (ref 26.6–33)
MCH RBC QN AUTO: 32.7 PG (ref 26.6–33)
MCH RBC QN AUTO: 32.7 PG (ref 26.6–33)
MCH RBC QN AUTO: 32.8 PG (ref 26.6–33)
MCH RBC QN AUTO: 32.9 PG (ref 26.6–33)
MCH RBC QN AUTO: 33.3 PG (ref 26.6–33)
MCHC RBC AUTO-ENTMCNC: 30.8 G/DL (ref 31.5–35.7)
MCHC RBC AUTO-ENTMCNC: 31.2 G/DL (ref 31.5–35.7)
MCHC RBC AUTO-ENTMCNC: 31.4 G/DL (ref 31.5–35.7)
MCHC RBC AUTO-ENTMCNC: 31.7 G/DL (ref 31.5–35.7)
MCHC RBC AUTO-ENTMCNC: 31.7 G/DL (ref 31.5–35.7)
MCHC RBC AUTO-ENTMCNC: 31.9 G/DL (ref 31.5–35.7)
MCHC RBC AUTO-ENTMCNC: 32.1 G/DL (ref 33–37)
MCHC RBC AUTO-ENTMCNC: 32.4 G/DL (ref 31.5–35.7)
MCHC RBC AUTO-ENTMCNC: 32.9 G/DL (ref 31.5–35.7)
MCV RBC AUTO: 101.9 FL (ref 79–97)
MCV RBC AUTO: 102.8 FL (ref 79–97)
MCV RBC AUTO: 102.8 FL (ref 79–97)
MCV RBC AUTO: 103.6 FL (ref 79–97)
MCV RBC AUTO: 104 FL (ref 79–97)
MCV RBC AUTO: 104.8 FL (ref 79–97)
MCV RBC AUTO: 105.2 FL (ref 79–97)
MCV RBC AUTO: 97.9 FL (ref 79–97)
MCV RBC AUTO: 99.8 FL (ref 80–94)
METHGB BLD QL: 0.3 % (ref 0–3)
METHGB BLD QL: 0.4 % (ref 0–3)
MODALITY: ABNORMAL
MODALITY: ABNORMAL
MONOCYTES # BLD AUTO: 0.2 10*3/MM3 (ref 0.1–0.9)
MONOCYTES # BLD AUTO: 0.48 10*3/MM3 (ref 0.1–0.9)
MONOCYTES # BLD AUTO: 0.54 10*3/MM3 (ref 0.1–0.9)
MONOCYTES # BLD AUTO: 0.62 10*3/MM3 (ref 0.1–0.9)
MONOCYTES # BLD AUTO: 0.67 10*3/MM3 (ref 0.1–0.9)
MONOCYTES # BLD AUTO: 0.75 10*3/MM3 (ref 0.1–0.9)
MONOCYTES # BLD AUTO: 0.82 10*3/MM3 (ref 0.1–0.9)
MONOCYTES # BLD AUTO: 0.86 10*3/MM3 (ref 0.1–0.9)
MONOCYTES # BLD AUTO: 0.93 10*3/MM3 (ref 0.1–0.9)
MONOCYTES NFR BLD AUTO: 10 % (ref 0–12)
MONOCYTES NFR BLD AUTO: 2.6 % (ref 5–12)
MONOCYTES NFR BLD AUTO: 6.1 % (ref 5–12)
MONOCYTES NFR BLD AUTO: 6.8 % (ref 5–12)
MONOCYTES NFR BLD AUTO: 7.2 % (ref 5–12)
MONOCYTES NFR BLD AUTO: 7.9 % (ref 5–12)
MONOCYTES NFR BLD AUTO: 8.3 % (ref 5–12)
MONOCYTES NFR BLD AUTO: 8.4 % (ref 5–12)
MONOCYTES NFR BLD AUTO: 9.6 % (ref 5–12)
NEUTROPHILS # BLD AUTO: 10.71 10*3/MM3 (ref 1.7–7)
NEUTROPHILS # BLD AUTO: 11.84 10*3/MM3 (ref 1.7–7)
NEUTROPHILS # BLD AUTO: 4.59 10*3/MM3 (ref 1.4–7)
NEUTROPHILS # BLD AUTO: 4.85 10*3/MM3 (ref 1.7–7)
NEUTROPHILS # BLD AUTO: 5.53 10*3/MM3 (ref 1.4–6.5)
NEUTROPHILS # BLD AUTO: 5.83 10*3/MM3 (ref 1.4–7)
NEUTROPHILS # BLD AUTO: 7.06 10*3/MM3 (ref 1.7–7)
NEUTROPHILS # BLD AUTO: 7.19 10*3/MM3 (ref 1.7–7)
NEUTROPHILS # BLD AUTO: 7.64 10*3/MM3 (ref 1.7–7)
NEUTROPHILS NFR BLD AUTO: 73.3 % (ref 42.7–76)
NEUTROPHILS NFR BLD AUTO: 73.5 % (ref 40–75)
NEUTROPHILS NFR BLD AUTO: 74.9 % (ref 42.7–76)
NEUTROPHILS NFR BLD AUTO: 75.6 % (ref 42.7–76)
NEUTROPHILS NFR BLD AUTO: 80.6 % (ref 42.7–76)
NEUTROPHILS NFR BLD AUTO: 83.5 % (ref 42.7–76)
NEUTROPHILS NFR BLD AUTO: 84.2 % (ref 42.7–76)
NEUTROPHILS NFR BLD AUTO: 88.2 % (ref 42.7–76)
NEUTROPHILS NFR BLD AUTO: 92 % (ref 42.7–76)
NITRITE UR QL STRIP: NEGATIVE
NITRITE UR QL STRIP: NEGATIVE
NOTE: ABNORMAL
NOTE: ABNORMAL
NOTIFIED BY: ABNORMAL
NOTIFIED WHO: ABNORMAL
NRBC BLD AUTO-RTO: 0 /100 WBC (ref 0–0)
NRBC BLD AUTO-RTO: 0 /100 WBC (ref 0–0.2)
NRBC BLD AUTO-RTO: 0.2 /100 WBC (ref 0–0.2)
NRBC BLD AUTO-RTO: 0.2 /100 WBC (ref 0–0.2)
NT-PROBNP SERPL-MCNC: ABNORMAL PG/ML (ref 5–1800)
NT-PROBNP SERPL-MCNC: ABNORMAL PG/ML (ref 5–1800)
OSMOLALITY SERPL CALC.SUM OF ELEC: 281.2 MOSM/KG (ref 273–305)
OXYHGB MFR BLDV: 75 % (ref 94–99)
OXYHGB MFR BLDV: 90.4 % (ref 94–99)
PATH REPORT.FINAL DX SPEC: NORMAL
PCO2 BLDA: 49.6 MM HG (ref 35–45)
PCO2 BLDA: 52.3 MM HG (ref 35–45)
PCO2 TEMP ADJ BLD: ABNORMAL MM[HG]
PCO2 TEMP ADJ BLD: ABNORMAL MM[HG]
PH BLDA: 7.34 PH UNITS (ref 7.35–7.45)
PH BLDA: 7.36 PH UNITS (ref 7.35–7.45)
PH UR STRIP.AUTO: <=5 [PH] (ref 5–8)
PH UR STRIP.AUTO: <=5 [PH] (ref 5–8)
PH, TEMP CORRECTED: ABNORMAL
PH, TEMP CORRECTED: ABNORMAL
PLATELET # BLD AUTO: 106 10*3/MM3 (ref 140–450)
PLATELET # BLD AUTO: 113 10*3/MM3 (ref 140–450)
PLATELET # BLD AUTO: 115 10*3/MM3 (ref 140–450)
PLATELET # BLD AUTO: 132 10*3/MM3 (ref 140–450)
PLATELET # BLD AUTO: 133 10*3/MM3 (ref 140–450)
PLATELET # BLD AUTO: 134 10*3/MM3 (ref 140–450)
PLATELET # BLD AUTO: 148 10*3/MM3 (ref 140–450)
PLATELET # BLD AUTO: 167 10*3/MM3 (ref 130–400)
PLATELET # BLD AUTO: 186 10*3/MM3 (ref 140–450)
PMV BLD AUTO: 10.9 FL (ref 6–12)
PMV BLD AUTO: 11.5 FL (ref 6–10)
PMV BLD AUTO: 11.8 FL (ref 6–12)
PMV BLD AUTO: 11.9 FL (ref 6–12)
PMV BLD AUTO: 12 FL (ref 6–12)
PMV BLD AUTO: 12.2 FL (ref 6–12)
PMV BLD AUTO: 12.3 FL (ref 6–12)
PO2 BLDA: 44.3 MM HG (ref 83–108)
PO2 BLDA: 68.7 MM HG (ref 83–108)
PO2 TEMP ADJ BLD: ABNORMAL MM[HG]
PO2 TEMP ADJ BLD: ABNORMAL MM[HG]
POTASSIUM BLD-SCNC: 3.8 MMOL/L (ref 3.5–5.2)
POTASSIUM BLD-SCNC: 4.2 MMOL/L (ref 3.5–5.2)
POTASSIUM BLD-SCNC: 4.3 MMOL/L (ref 3.5–5.2)
POTASSIUM BLD-SCNC: 4.4 MMOL/L (ref 3.5–5.2)
POTASSIUM BLD-SCNC: 4.4 MMOL/L (ref 3.5–5.2)
POTASSIUM BLD-SCNC: 4.6 MMOL/L (ref 3.5–5.3)
POTASSIUM BLD-SCNC: 4.7 MMOL/L (ref 3.5–5.2)
POTASSIUM BLD-SCNC: 4.8 MMOL/L (ref 3.5–5.2)
POTASSIUM BLD-SCNC: 4.8 MMOL/L (ref 3.5–5.2)
PROCALCITONIN SERPL-MCNC: 0.16 NG/ML (ref 0.1–0.25)
PROT SERPL-MCNC: 5.9 G/DL (ref 6–8.5)
PROT SERPL-MCNC: 6.6 G/DL (ref 6–8.5)
PROT SERPL-MCNC: 7.1 G/DL (ref 6–8)
PROT SERPL-MCNC: 7.2 G/DL (ref 6–8.5)
PROT SERPL-MCNC: 7.4 G/DL (ref 6–8.5)
PROT SERPL-MCNC: 7.5 G/DL (ref 6–8.5)
PROT SERPL-MCNC: 7.9 G/DL (ref 6–8.5)
PROT UR QL STRIP: NEGATIVE
PROT UR QL STRIP: NEGATIVE
PROTHROMBIN TIME: 14.4 SECONDS (ref 11–15.4)
RBC # BLD AUTO: 3.73 10*6/MM3 (ref 4.14–5.8)
RBC # BLD AUTO: 3.88 10*6/MM3 (ref 4.14–5.8)
RBC # BLD AUTO: 3.95 10*6/MM3 (ref 4.14–5.8)
RBC # BLD AUTO: 3.99 10*6/MM3 (ref 4.14–5.8)
RBC # BLD AUTO: 4 10*6/MM3 (ref 4.7–6.1)
RBC # BLD AUTO: 4.04 10*6/MM3 (ref 4.14–5.8)
RBC # BLD AUTO: 4.2 10*6/MM3 (ref 4.14–5.8)
RBC # BLD AUTO: 4.24 10*6/MM3 (ref 4.14–5.8)
RBC # BLD AUTO: 4.27 10*6/MM3 (ref 4.14–5.8)
RBC # UR: NORMAL /HPF
REF LAB TEST METHOD: NORMAL
SAO2 % BLDCOA: 76.8 % (ref 94–99)
SAO2 % BLDCOA: 92.7 % (ref 94–99)
SODIUM BLD-SCNC: 134 MMOL/L (ref 136–145)
SODIUM BLD-SCNC: 135 MMOL/L (ref 136–145)
SODIUM BLD-SCNC: 136 MMOL/L (ref 136–145)
SODIUM BLD-SCNC: 138 MMOL/L (ref 136–145)
SODIUM BLD-SCNC: 138 MMOL/L (ref 136–145)
SODIUM BLD-SCNC: 139 MMOL/L (ref 135–153)
SODIUM BLD-SCNC: 139 MMOL/L (ref 136–145)
SODIUM BLD-SCNC: 141 MMOL/L (ref 136–145)
SODIUM BLD-SCNC: 141 MMOL/L (ref 136–145)
SP GR UR STRIP: 1.01 (ref 1–1.03)
SP GR UR STRIP: 1.01 (ref 1–1.03)
SQUAMOUS #/AREA URNS HPF: NORMAL /HPF
T4 FREE SERPL-MCNC: 1.38 NG/DL (ref 0.93–1.7)
T4 FREE SERPL-MCNC: 1.41 NG/DL (ref 0.89–1.76)
T4 FREE SERPL-MCNC: 1.49 NG/DL (ref 0.93–1.7)
T4 FREE SERPL-MCNC: 2.25 NG/DL (ref 0.93–1.7)
TRIGL SERPL-MCNC: 62 MG/DL (ref 0–150)
TROPONIN T SERPL-MCNC: 0.01 NG/ML (ref 0–0.03)
TROPONIN T SERPL-MCNC: 0.06 NG/ML (ref 0–0.03)
TROPONIN T SERPL-MCNC: 0.06 NG/ML (ref 0–0.03)
TROPONIN T SERPL-MCNC: <0.01 NG/ML
TSH SERPL DL<=0.05 MIU/L-ACNC: 0.08 UIU/ML (ref 0.27–4.2)
TSH SERPL DL<=0.05 MIU/L-ACNC: 0.28 UIU/ML (ref 0.27–4.2)
TSH SERPL DL<=0.05 MIU/L-ACNC: 15.9 MIU/ML (ref 0.27–4.2)
TSH SERPL DL<=0.05 MIU/L-ACNC: 2.84 MIU/ML (ref 0.55–4.78)
UROBILINOGEN UR QL STRIP: ABNORMAL
UROBILINOGEN UR QL STRIP: NORMAL
VENTILATOR MODE: ABNORMAL
VENTILATOR MODE: ABNORMAL
VIT B12 BLD-MCNC: 1741 PG/ML (ref 211–946)
VLDLC SERPL-MCNC: 12.4 MG/DL (ref 5–40)
WBC NRBC COR # BLD: 12.83 10*3/MM3 (ref 3.4–10.8)
WBC NRBC COR # BLD: 13.42 10*3/MM3 (ref 3.4–10.8)
WBC NRBC COR # BLD: 6.07 10*3/MM3 (ref 3.4–10.8)
WBC NRBC COR # BLD: 6.48 10*3/MM3 (ref 3.4–10.8)
WBC NRBC COR # BLD: 7.52 10*3/MM3 (ref 4.5–12.5)
WBC NRBC COR # BLD: 7.67 10*3/MM3 (ref 3.4–10.8)
WBC NRBC COR # BLD: 7.96 10*3/MM3 (ref 3.4–10.8)
WBC NRBC COR # BLD: 8.93 10*3/MM3 (ref 3.4–10.8)
WBC NRBC COR # BLD: 9.08 10*3/MM3 (ref 3.4–10.8)
WBC UR QL AUTO: NORMAL /HPF

## 2019-01-01 PROCEDURE — 94799 UNLISTED PULMONARY SVC/PX: CPT

## 2019-01-01 PROCEDURE — 86140 C-REACTIVE PROTEIN: CPT | Performed by: INTERNAL MEDICINE

## 2019-01-01 PROCEDURE — 25010000002 MORPHINE PER 10 MG: Performed by: NURSE PRACTITIONER

## 2019-01-01 PROCEDURE — 99213 OFFICE O/P EST LOW 20 MIN: CPT | Performed by: INTERNAL MEDICINE

## 2019-01-01 PROCEDURE — 25010000002 FUROSEMIDE PER 20 MG: Performed by: INTERNAL MEDICINE

## 2019-01-01 PROCEDURE — 99233 SBSQ HOSP IP/OBS HIGH 50: CPT | Performed by: INTERNAL MEDICINE

## 2019-01-01 PROCEDURE — 82746 ASSAY OF FOLIC ACID SERUM: CPT | Performed by: INTERNAL MEDICINE

## 2019-01-01 PROCEDURE — 25010000002 LORAZEPAM PER 2 MG: Performed by: NURSE PRACTITIONER

## 2019-01-01 PROCEDURE — 75716 ARTERY X-RAYS ARMS/LEGS: CPT | Performed by: INTERNAL MEDICINE

## 2019-01-01 PROCEDURE — G0439 PPPS, SUBSEQ VISIT: HCPCS | Performed by: FAMILY MEDICINE

## 2019-01-01 PROCEDURE — 84439 ASSAY OF FREE THYROXINE: CPT | Performed by: INTERNAL MEDICINE

## 2019-01-01 PROCEDURE — 76536 US EXAM OF HEAD AND NECK: CPT | Performed by: RADIOLOGY

## 2019-01-01 PROCEDURE — 25010000002 MORPHINE PER 10 MG: Performed by: FAMILY MEDICINE

## 2019-01-01 PROCEDURE — 84484 ASSAY OF TROPONIN QUANT: CPT | Performed by: FAMILY MEDICINE

## 2019-01-01 PROCEDURE — 94640 AIRWAY INHALATION TREATMENT: CPT

## 2019-01-01 PROCEDURE — 84484 ASSAY OF TROPONIN QUANT: CPT | Performed by: INTERNAL MEDICINE

## 2019-01-01 PROCEDURE — 85025 COMPLETE CBC W/AUTO DIFF WBC: CPT | Performed by: FAMILY MEDICINE

## 2019-01-01 PROCEDURE — 99204 OFFICE O/P NEW MOD 45 MIN: CPT | Performed by: INTERNAL MEDICINE

## 2019-01-01 PROCEDURE — 71045 X-RAY EXAM CHEST 1 VIEW: CPT

## 2019-01-01 PROCEDURE — 82553 CREATINE MB FRACTION: CPT | Performed by: FAMILY MEDICINE

## 2019-01-01 PROCEDURE — 85025 COMPLETE CBC W/AUTO DIFF WBC: CPT | Performed by: INTERNAL MEDICINE

## 2019-01-01 PROCEDURE — 25010000002 THIAMINE PER 100 MG: Performed by: INTERNAL MEDICINE

## 2019-01-01 PROCEDURE — 25010000002 HEPARIN (PORCINE) PER 1000 UNITS: Performed by: INTERNAL MEDICINE

## 2019-01-01 PROCEDURE — 88311 DECALCIFY TISSUE: CPT | Performed by: PODIATRIST

## 2019-01-01 PROCEDURE — 25010000002 VANCOMYCIN 1 G RECONSTITUTED SOLUTION: Performed by: PODIATRIST

## 2019-01-01 PROCEDURE — 82375 ASSAY CARBOXYHB QUANT: CPT

## 2019-01-01 PROCEDURE — 83735 ASSAY OF MAGNESIUM: CPT | Performed by: INTERNAL MEDICINE

## 2019-01-01 PROCEDURE — 84439 ASSAY OF FREE THYROXINE: CPT | Performed by: FAMILY MEDICINE

## 2019-01-01 PROCEDURE — 25010000002 ENOXAPARIN PER 10 MG: Performed by: INTERNAL MEDICINE

## 2019-01-01 PROCEDURE — 84443 ASSAY THYROID STIM HORMONE: CPT | Performed by: FAMILY MEDICINE

## 2019-01-01 PROCEDURE — 76536 US EXAM OF HEAD AND NECK: CPT

## 2019-01-01 PROCEDURE — 80053 COMPREHEN METABOLIC PANEL: CPT | Performed by: FAMILY MEDICINE

## 2019-01-01 PROCEDURE — 75625 CONTRAST EXAM ABDOMINL AORTA: CPT | Performed by: INTERNAL MEDICINE

## 2019-01-01 PROCEDURE — 83880 ASSAY OF NATRIURETIC PEPTIDE: CPT | Performed by: EMERGENCY MEDICINE

## 2019-01-01 PROCEDURE — 25010000002 CEFTRIAXONE: Performed by: EMERGENCY MEDICINE

## 2019-01-01 PROCEDURE — 25010000003 CEFAZOLIN SODIUM-DEXTROSE 2-3 GM-%(50ML) RECONSTITUTED SOLUTION: Performed by: PODIATRIST

## 2019-01-01 PROCEDURE — 83880 ASSAY OF NATRIURETIC PEPTIDE: CPT | Performed by: INTERNAL MEDICINE

## 2019-01-01 PROCEDURE — 99214 OFFICE O/P EST MOD 30 MIN: CPT | Performed by: FAMILY MEDICINE

## 2019-01-01 PROCEDURE — 93010 ELECTROCARDIOGRAM REPORT: CPT | Performed by: INTERNAL MEDICINE

## 2019-01-01 PROCEDURE — 76705 ECHO EXAM OF ABDOMEN: CPT

## 2019-01-01 PROCEDURE — 80061 LIPID PANEL: CPT | Performed by: FAMILY MEDICINE

## 2019-01-01 PROCEDURE — 93306 TTE W/DOPPLER COMPLETE: CPT | Performed by: INTERNAL MEDICINE

## 2019-01-01 PROCEDURE — 99231 SBSQ HOSP IP/OBS SF/LOW 25: CPT | Performed by: INTERNAL MEDICINE

## 2019-01-01 PROCEDURE — 25010000002 FENTANYL CITRATE (PF) 100 MCG/2ML SOLUTION: Performed by: NURSE ANESTHETIST, CERTIFIED REGISTERED

## 2019-01-01 PROCEDURE — 82805 BLOOD GASES W/O2 SATURATION: CPT

## 2019-01-01 PROCEDURE — 81003 URINALYSIS AUTO W/O SCOPE: CPT | Performed by: EMERGENCY MEDICINE

## 2019-01-01 PROCEDURE — 88307 TISSUE EXAM BY PATHOLOGIST: CPT | Performed by: PODIATRIST

## 2019-01-01 PROCEDURE — 84484 ASSAY OF TROPONIN QUANT: CPT | Performed by: EMERGENCY MEDICINE

## 2019-01-01 PROCEDURE — 36200 PLACE CATHETER IN AORTA: CPT | Performed by: INTERNAL MEDICINE

## 2019-01-01 PROCEDURE — 25010000002 LORAZEPAM PER 2 MG: Performed by: INTERNAL MEDICINE

## 2019-01-01 PROCEDURE — 83050 HGB METHEMOGLOBIN QUAN: CPT

## 2019-01-01 PROCEDURE — 36600 WITHDRAWAL OF ARTERIAL BLOOD: CPT

## 2019-01-01 PROCEDURE — 82607 VITAMIN B-12: CPT | Performed by: INTERNAL MEDICINE

## 2019-01-01 PROCEDURE — 63710000001 PREDNISONE PER 1 MG: Performed by: INTERNAL MEDICINE

## 2019-01-01 PROCEDURE — 87040 BLOOD CULTURE FOR BACTERIA: CPT | Performed by: EMERGENCY MEDICINE

## 2019-01-01 PROCEDURE — 81001 URINALYSIS AUTO W/SCOPE: CPT | Performed by: PODIATRIST

## 2019-01-01 PROCEDURE — 76705 ECHO EXAM OF ABDOMEN: CPT | Performed by: RADIOLOGY

## 2019-01-01 PROCEDURE — 25010000002 IOPAMIDOL 61 % SOLUTION: Performed by: INTERNAL MEDICINE

## 2019-01-01 PROCEDURE — 93005 ELECTROCARDIOGRAM TRACING: CPT

## 2019-01-01 PROCEDURE — 80053 COMPREHEN METABOLIC PANEL: CPT | Performed by: INTERNAL MEDICINE

## 2019-01-01 PROCEDURE — 80074 ACUTE HEPATITIS PANEL: CPT | Performed by: INTERNAL MEDICINE

## 2019-01-01 PROCEDURE — 93005 ELECTROCARDIOGRAM TRACING: CPT | Performed by: FAMILY MEDICINE

## 2019-01-01 PROCEDURE — 80048 BASIC METABOLIC PNL TOTAL CA: CPT | Performed by: INTERNAL MEDICINE

## 2019-01-01 PROCEDURE — 71045 X-RAY EXAM CHEST 1 VIEW: CPT | Performed by: RADIOLOGY

## 2019-01-01 PROCEDURE — 93005 ELECTROCARDIOGRAM TRACING: CPT | Performed by: EMERGENCY MEDICINE

## 2019-01-01 PROCEDURE — C1887 CATHETER, GUIDING: HCPCS | Performed by: INTERNAL MEDICINE

## 2019-01-01 PROCEDURE — 83605 ASSAY OF LACTIC ACID: CPT | Performed by: EMERGENCY MEDICINE

## 2019-01-01 PROCEDURE — 71250 CT THORAX DX C-: CPT | Performed by: RADIOLOGY

## 2019-01-01 PROCEDURE — 25010000002 MIDAZOLAM PER 1 MG: Performed by: INTERNAL MEDICINE

## 2019-01-01 PROCEDURE — 25010000002 DIPHENHYDRAMINE PER 50 MG: Performed by: INTERNAL MEDICINE

## 2019-01-01 PROCEDURE — 99285 EMERGENCY DEPT VISIT HI MDM: CPT

## 2019-01-01 PROCEDURE — 84145 PROCALCITONIN (PCT): CPT | Performed by: INTERNAL MEDICINE

## 2019-01-01 PROCEDURE — C1769 GUIDE WIRE: HCPCS | Performed by: INTERNAL MEDICINE

## 2019-01-01 PROCEDURE — 99222 1ST HOSP IP/OBS MODERATE 55: CPT | Performed by: INTERNAL MEDICINE

## 2019-01-01 PROCEDURE — 25010000002 AZITHROMYCIN: Performed by: EMERGENCY MEDICINE

## 2019-01-01 PROCEDURE — 84443 ASSAY THYROID STIM HORMONE: CPT | Performed by: INTERNAL MEDICINE

## 2019-01-01 PROCEDURE — 71250 CT THORAX DX C-: CPT

## 2019-01-01 PROCEDURE — 99223 1ST HOSP IP/OBS HIGH 75: CPT | Performed by: INTERNAL MEDICINE

## 2019-01-01 PROCEDURE — 93306 TTE W/DOPPLER COMPLETE: CPT

## 2019-01-01 PROCEDURE — C1894 INTRO/SHEATH, NON-LASER: HCPCS | Performed by: INTERNAL MEDICINE

## 2019-01-01 PROCEDURE — 99232 SBSQ HOSP IP/OBS MODERATE 35: CPT | Performed by: INTERNAL MEDICINE

## 2019-01-01 PROCEDURE — 85025 COMPLETE CBC W/AUTO DIFF WBC: CPT | Performed by: EMERGENCY MEDICINE

## 2019-01-01 PROCEDURE — 80053 COMPREHEN METABOLIC PANEL: CPT | Performed by: EMERGENCY MEDICINE

## 2019-01-01 PROCEDURE — 85610 PROTHROMBIN TIME: CPT | Performed by: INTERNAL MEDICINE

## 2019-01-01 PROCEDURE — 25010000002 PROPOFOL 10 MG/ML EMULSION: Performed by: NURSE ANESTHETIST, CERTIFIED REGISTERED

## 2019-01-01 PROCEDURE — 85007 BL SMEAR W/DIFF WBC COUNT: CPT | Performed by: INTERNAL MEDICINE

## 2019-01-01 RX ORDER — LEVOTHYROXINE SODIUM 0.12 MG/1
125 TABLET ORAL DAILY
Status: CANCELLED | OUTPATIENT
Start: 2019-01-01

## 2019-01-01 RX ORDER — LEVOTHYROXINE SODIUM 0.12 MG/1
125 TABLET ORAL
Status: DISCONTINUED | OUTPATIENT
Start: 2019-01-01 | End: 2019-01-01

## 2019-01-01 RX ORDER — LISINOPRIL 10 MG/1
10 TABLET ORAL DAILY
Status: DISCONTINUED | OUTPATIENT
Start: 2019-01-01 | End: 2019-01-01

## 2019-01-01 RX ORDER — LEVOTHYROXINE SODIUM 137 UG/1
137 TABLET ORAL DAILY
Qty: 30 TABLET | Refills: 2 | Status: SHIPPED | OUTPATIENT
Start: 2019-01-01 | End: 2019-01-01 | Stop reason: SDUPTHER

## 2019-01-01 RX ORDER — CEPHALEXIN 500 MG/1
500 CAPSULE ORAL EVERY 8 HOURS SCHEDULED
Status: CANCELLED | OUTPATIENT
Start: 2019-01-01 | End: 2019-01-01

## 2019-01-01 RX ORDER — IPRATROPIUM BROMIDE AND ALBUTEROL SULFATE 2.5; .5 MG/3ML; MG/3ML
3 SOLUTION RESPIRATORY (INHALATION)
Status: DISCONTINUED | OUTPATIENT
Start: 2019-01-01 | End: 2019-01-01 | Stop reason: HOSPADM

## 2019-01-01 RX ORDER — SODIUM CHLORIDE 0.9 % (FLUSH) 0.9 %
10 SYRINGE (ML) INJECTION AS NEEDED
Status: DISCONTINUED | OUTPATIENT
Start: 2019-01-01 | End: 2019-01-01 | Stop reason: HOSPADM

## 2019-01-01 RX ORDER — FENTANYL CITRATE 50 UG/ML
INJECTION, SOLUTION INTRAMUSCULAR; INTRAVENOUS AS NEEDED
Status: DISCONTINUED | OUTPATIENT
Start: 2019-01-01 | End: 2019-01-01 | Stop reason: SURG

## 2019-01-01 RX ORDER — VANCOMYCIN HYDROCHLORIDE 1 G/20ML
INJECTION, POWDER, LYOPHILIZED, FOR SOLUTION INTRAVENOUS AS NEEDED
Status: DISCONTINUED | OUTPATIENT
Start: 2019-01-01 | End: 2019-01-01 | Stop reason: HOSPADM

## 2019-01-01 RX ORDER — FUROSEMIDE 10 MG/ML
40 INJECTION INTRAMUSCULAR; INTRAVENOUS ONCE
Status: COMPLETED | OUTPATIENT
Start: 2019-01-01 | End: 2019-01-01

## 2019-01-01 RX ORDER — ATORVASTATIN CALCIUM 20 MG/1
20 TABLET, FILM COATED ORAL DAILY
Qty: 30 TABLET | Refills: 5 | Status: SHIPPED | OUTPATIENT
Start: 2019-01-01

## 2019-01-01 RX ORDER — MEPERIDINE HYDROCHLORIDE 25 MG/ML
12.5 INJECTION INTRAMUSCULAR; INTRAVENOUS; SUBCUTANEOUS
Status: DISCONTINUED | OUTPATIENT
Start: 2019-01-01 | End: 2019-01-01 | Stop reason: HOSPADM

## 2019-01-01 RX ORDER — ONDANSETRON 2 MG/ML
4 INJECTION INTRAMUSCULAR; INTRAVENOUS EVERY 6 HOURS PRN
Status: CANCELLED | OUTPATIENT
Start: 2019-01-01

## 2019-01-01 RX ORDER — DIPHENHYDRAMINE HYDROCHLORIDE 50 MG/ML
12.5 INJECTION INTRAMUSCULAR; INTRAVENOUS ONCE
Status: COMPLETED | OUTPATIENT
Start: 2019-01-01 | End: 2019-01-01

## 2019-01-01 RX ORDER — MORPHINE SULFATE 2 MG/ML
2 INJECTION, SOLUTION INTRAMUSCULAR; INTRAVENOUS EVERY 6 HOURS SCHEDULED
Status: DISCONTINUED | OUTPATIENT
Start: 2019-01-01 | End: 2019-01-01

## 2019-01-01 RX ORDER — LEVOTHYROXINE SODIUM 0.12 MG/1
125 TABLET ORAL DAILY
Qty: 30 TABLET | Refills: 2 | Status: SHIPPED | OUTPATIENT
Start: 2019-01-01

## 2019-01-01 RX ORDER — CLOPIDOGREL BISULFATE 75 MG/1
75 TABLET ORAL DAILY
Qty: 30 TABLET | Refills: 11 | Status: ON HOLD | OUTPATIENT
Start: 2019-01-01 | End: 2019-01-01

## 2019-01-01 RX ORDER — LISINOPRIL 10 MG/1
10 TABLET ORAL DAILY
Status: CANCELLED | OUTPATIENT
Start: 2019-01-01

## 2019-01-01 RX ORDER — LORAZEPAM 2 MG/ML
0.5 INJECTION INTRAMUSCULAR ONCE
Status: COMPLETED | OUTPATIENT
Start: 2019-01-01 | End: 2019-01-01

## 2019-01-01 RX ORDER — ONDANSETRON 2 MG/ML
4 INJECTION INTRAMUSCULAR; INTRAVENOUS ONCE AS NEEDED
Status: DISCONTINUED | OUTPATIENT
Start: 2019-01-01 | End: 2019-01-01 | Stop reason: HOSPADM

## 2019-01-01 RX ORDER — ONDANSETRON 4 MG/1
4 TABLET, FILM COATED ORAL EVERY 6 HOURS PRN
Status: CANCELLED | OUTPATIENT
Start: 2019-01-01

## 2019-01-01 RX ORDER — LIDOCAINE HYDROCHLORIDE 10 MG/ML
INJECTION, SOLUTION INFILTRATION; PERINEURAL AS NEEDED
Status: DISCONTINUED | OUTPATIENT
Start: 2019-01-01 | End: 2019-01-01 | Stop reason: HOSPADM

## 2019-01-01 RX ORDER — SODIUM CHLORIDE 0.9 % (FLUSH) 0.9 %
3 SYRINGE (ML) INJECTION EVERY 12 HOURS SCHEDULED
Status: DISCONTINUED | OUTPATIENT
Start: 2019-01-01 | End: 2019-01-01 | Stop reason: HOSPADM

## 2019-01-01 RX ORDER — BUDESONIDE AND FORMOTEROL FUMARATE DIHYDRATE 80; 4.5 UG/1; UG/1
2 AEROSOL RESPIRATORY (INHALATION)
Status: DISCONTINUED | OUTPATIENT
Start: 2019-01-01 | End: 2019-01-01

## 2019-01-01 RX ORDER — GLYCOPYRROLATE 0.2 MG/ML
0.4 INJECTION INTRAMUSCULAR; INTRAVENOUS EVERY 4 HOURS PRN
Status: DISCONTINUED | OUTPATIENT
Start: 2019-01-01 | End: 2019-01-01 | Stop reason: HOSPADM

## 2019-01-01 RX ORDER — PREDNISONE 20 MG/1
40 TABLET ORAL
Status: DISCONTINUED | OUTPATIENT
Start: 2019-01-01 | End: 2019-01-01

## 2019-01-01 RX ORDER — SODIUM CHLORIDE 9 MG/ML
100 INJECTION, SOLUTION INTRAVENOUS ONCE
Status: CANCELLED | OUTPATIENT
Start: 2019-01-01 | End: 2019-01-01

## 2019-01-01 RX ORDER — HEPARIN SODIUM 5000 [USP'U]/ML
5000 INJECTION, SOLUTION INTRAVENOUS; SUBCUTANEOUS EVERY 8 HOURS SCHEDULED
Status: DISCONTINUED | OUTPATIENT
Start: 2019-01-01 | End: 2019-01-01

## 2019-01-01 RX ORDER — CLOPIDOGREL BISULFATE 75 MG/1
75 TABLET ORAL DAILY
Status: CANCELLED | OUTPATIENT
Start: 2019-01-01

## 2019-01-01 RX ORDER — QUETIAPINE FUMARATE 25 MG/1
12.5 TABLET, FILM COATED ORAL ONCE
Status: COMPLETED | OUTPATIENT
Start: 2019-01-01 | End: 2019-01-01

## 2019-01-01 RX ORDER — HALOPERIDOL 5 MG/1
5 TABLET ORAL ONCE
Status: COMPLETED | OUTPATIENT
Start: 2019-01-01 | End: 2019-01-01

## 2019-01-01 RX ORDER — ASPIRIN 81 MG/1
81 TABLET ORAL DAILY
Status: CANCELLED | OUTPATIENT
Start: 2019-01-01

## 2019-01-01 RX ORDER — QUETIAPINE FUMARATE 25 MG/1
25 TABLET, FILM COATED ORAL EVERY 8 HOURS SCHEDULED
Status: DISCONTINUED | OUTPATIENT
Start: 2019-01-01 | End: 2019-01-01

## 2019-01-01 RX ORDER — LISINOPRIL 10 MG/1
10 TABLET ORAL DAILY
Qty: 30 TABLET | Refills: 5 | Status: SHIPPED | OUTPATIENT
Start: 2019-01-01

## 2019-01-01 RX ORDER — MORPHINE SULFATE 10 MG/5ML
2 SOLUTION ORAL EVERY 6 HOURS
Status: DISCONTINUED | OUTPATIENT
Start: 2019-01-01 | End: 2019-01-01

## 2019-01-01 RX ORDER — CLINDAMYCIN HYDROCHLORIDE 300 MG/1
CAPSULE ORAL
COMMUNITY
Start: 2019-01-01 | End: 2019-01-01

## 2019-01-01 RX ORDER — PROPOFOL 10 MG/ML
VIAL (ML) INTRAVENOUS AS NEEDED
Status: DISCONTINUED | OUTPATIENT
Start: 2019-01-01 | End: 2019-01-01 | Stop reason: SURG

## 2019-01-01 RX ORDER — HALOPERIDOL 5 MG/ML
5 INJECTION INTRAMUSCULAR EVERY 6 HOURS PRN
Status: DISCONTINUED | OUTPATIENT
Start: 2019-01-01 | End: 2019-01-01 | Stop reason: HOSPADM

## 2019-01-01 RX ORDER — IBUPROFEN 200 MG
200 TABLET ORAL EVERY 6 HOURS PRN
Status: CANCELLED | OUTPATIENT
Start: 2019-01-01

## 2019-01-01 RX ORDER — ASPIRIN 81 MG/1
81 TABLET, CHEWABLE ORAL DAILY
Status: DISCONTINUED | OUTPATIENT
Start: 2019-01-01 | End: 2019-01-01

## 2019-01-01 RX ORDER — ATORVASTATIN CALCIUM 20 MG/1
20 TABLET, FILM COATED ORAL NIGHTLY
Status: DISCONTINUED | OUTPATIENT
Start: 2019-01-01 | End: 2019-01-01

## 2019-01-01 RX ORDER — IPRATROPIUM BROMIDE AND ALBUTEROL SULFATE 2.5; .5 MG/3ML; MG/3ML
3 SOLUTION RESPIRATORY (INHALATION) ONCE AS NEEDED
Status: DISCONTINUED | OUTPATIENT
Start: 2019-01-01 | End: 2019-01-01 | Stop reason: HOSPADM

## 2019-01-01 RX ORDER — SODIUM CHLORIDE 9 MG/ML
INJECTION, SOLUTION INTRAVENOUS CONTINUOUS PRN
Status: COMPLETED | OUTPATIENT
Start: 2019-01-01 | End: 2019-01-01

## 2019-01-01 RX ORDER — LORAZEPAM 2 MG/ML
0.5 INJECTION INTRAMUSCULAR EVERY 4 HOURS PRN
Status: DISCONTINUED | OUTPATIENT
Start: 2019-01-01 | End: 2019-01-01 | Stop reason: HOSPADM

## 2019-01-01 RX ORDER — TRAZODONE HYDROCHLORIDE 50 MG/1
25 TABLET ORAL ONCE
Status: COMPLETED | OUTPATIENT
Start: 2019-01-01 | End: 2019-01-01

## 2019-01-01 RX ORDER — FUROSEMIDE 10 MG/ML
80 INJECTION INTRAMUSCULAR; INTRAVENOUS ONCE
Status: COMPLETED | OUTPATIENT
Start: 2019-01-01 | End: 2019-01-01

## 2019-01-01 RX ORDER — IPRATROPIUM BROMIDE AND ALBUTEROL SULFATE 2.5; .5 MG/3ML; MG/3ML
3 SOLUTION RESPIRATORY (INHALATION) ONCE
Status: COMPLETED | OUTPATIENT
Start: 2019-01-01 | End: 2019-01-01

## 2019-01-01 RX ORDER — LEVOTHYROXINE SODIUM 0.12 MG/1
125 TABLET ORAL DAILY
Qty: 30 TABLET | Refills: 2 | Status: SHIPPED | OUTPATIENT
Start: 2019-01-01 | End: 2019-01-01 | Stop reason: SDUPTHER

## 2019-01-01 RX ORDER — LIDOCAINE HYDROCHLORIDE 20 MG/ML
INJECTION, SOLUTION INFILTRATION; PERINEURAL AS NEEDED
Status: DISCONTINUED | OUTPATIENT
Start: 2019-01-01 | End: 2019-01-01 | Stop reason: HOSPADM

## 2019-01-01 RX ORDER — OXYCODONE HYDROCHLORIDE AND ACETAMINOPHEN 5; 325 MG/1; MG/1
1 TABLET ORAL ONCE AS NEEDED
Status: DISCONTINUED | OUTPATIENT
Start: 2019-01-01 | End: 2019-01-01 | Stop reason: HOSPADM

## 2019-01-01 RX ORDER — ATORVASTATIN CALCIUM 20 MG/1
20 TABLET, FILM COATED ORAL DAILY
Status: CANCELLED | OUTPATIENT
Start: 2019-01-01

## 2019-01-01 RX ORDER — CEPHALEXIN 500 MG/1
500 CAPSULE ORAL 3 TIMES DAILY
Qty: 42 CAPSULE | Refills: 0 | Status: SHIPPED | OUTPATIENT
Start: 2019-01-01 | End: 2019-01-01

## 2019-01-01 RX ORDER — CEFAZOLIN SODIUM 2 G/50ML
2 SOLUTION INTRAVENOUS ONCE
Status: CANCELLED | OUTPATIENT
Start: 2019-01-01 | End: 2019-01-01

## 2019-01-01 RX ORDER — BUPIVACAINE HYDROCHLORIDE AND EPINEPHRINE 2.5; 5 MG/ML; UG/ML
INJECTION, SOLUTION EPIDURAL; INFILTRATION; INTRACAUDAL; PERINEURAL AS NEEDED
Status: DISCONTINUED | OUTPATIENT
Start: 2019-01-01 | End: 2019-01-01 | Stop reason: HOSPADM

## 2019-01-01 RX ORDER — VALACYCLOVIR HYDROCHLORIDE 500 MG/1
1000 TABLET, FILM COATED ORAL
Status: DISCONTINUED | OUTPATIENT
Start: 2019-01-01 | End: 2019-01-01

## 2019-01-01 RX ORDER — MORPHINE SULFATE 2 MG/ML
2 INJECTION, SOLUTION INTRAMUSCULAR; INTRAVENOUS
Status: DISCONTINUED | OUTPATIENT
Start: 2019-01-01 | End: 2019-01-01 | Stop reason: HOSPADM

## 2019-01-01 RX ORDER — SODIUM CHLORIDE, SODIUM LACTATE, POTASSIUM CHLORIDE, CALCIUM CHLORIDE 600; 310; 30; 20 MG/100ML; MG/100ML; MG/100ML; MG/100ML
125 INJECTION, SOLUTION INTRAVENOUS CONTINUOUS
Status: DISCONTINUED | OUTPATIENT
Start: 2019-01-01 | End: 2019-01-01 | Stop reason: HOSPADM

## 2019-01-01 RX ORDER — FENTANYL CITRATE 50 UG/ML
50 INJECTION, SOLUTION INTRAMUSCULAR; INTRAVENOUS
Status: DISCONTINUED | OUTPATIENT
Start: 2019-01-01 | End: 2019-01-01 | Stop reason: HOSPADM

## 2019-01-01 RX ORDER — SODIUM CHLORIDE 0.9 % (FLUSH) 0.9 %
3-10 SYRINGE (ML) INJECTION AS NEEDED
Status: DISCONTINUED | OUTPATIENT
Start: 2019-01-01 | End: 2019-01-01 | Stop reason: HOSPADM

## 2019-01-01 RX ORDER — LEVOTHYROXINE SODIUM 0.12 MG/1
125 TABLET ORAL DAILY
Status: DISCONTINUED | OUTPATIENT
Start: 2019-01-01 | End: 2019-01-01

## 2019-01-01 RX ORDER — HYDROCODONE BITARTRATE AND ACETAMINOPHEN 5; 325 MG/1; MG/1
1 TABLET ORAL EVERY 4 HOURS PRN
Status: CANCELLED | OUTPATIENT
Start: 2019-01-01

## 2019-01-01 RX ORDER — MORPHINE SULFATE 2 MG/ML
2 INJECTION, SOLUTION INTRAMUSCULAR; INTRAVENOUS EVERY 6 HOURS SCHEDULED
Status: DISCONTINUED | OUTPATIENT
Start: 2019-01-01 | End: 2019-01-01 | Stop reason: HOSPADM

## 2019-01-01 RX ORDER — MIDAZOLAM HYDROCHLORIDE 1 MG/ML
INJECTION INTRAMUSCULAR; INTRAVENOUS AS NEEDED
Status: DISCONTINUED | OUTPATIENT
Start: 2019-01-01 | End: 2019-01-01 | Stop reason: HOSPADM

## 2019-01-01 RX ORDER — SODIUM CHLORIDE 0.9 % (FLUSH) 0.9 %
10 SYRINGE (ML) INJECTION EVERY 12 HOURS SCHEDULED
Status: DISCONTINUED | OUTPATIENT
Start: 2019-01-01 | End: 2019-01-01 | Stop reason: HOSPADM

## 2019-01-01 RX ORDER — CEFAZOLIN SODIUM 2 G/50ML
2 SOLUTION INTRAVENOUS ONCE
Status: COMPLETED | OUTPATIENT
Start: 2019-01-01 | End: 2019-01-01

## 2019-01-01 RX ORDER — HYDROCODONE BITARTRATE AND ACETAMINOPHEN 5; 325 MG/1; MG/1
TABLET ORAL
Qty: 20 TABLET | Refills: 0 | Status: SHIPPED | OUTPATIENT
Start: 2019-01-01 | End: 2019-01-01

## 2019-01-01 RX ADMIN — IPRATROPIUM BROMIDE AND ALBUTEROL SULFATE 3 ML: .5; 3 SOLUTION RESPIRATORY (INHALATION) at 06:56

## 2019-01-01 RX ADMIN — IPRATROPIUM BROMIDE AND ALBUTEROL SULFATE 3 ML: .5; 3 SOLUTION RESPIRATORY (INHALATION) at 12:25

## 2019-01-01 RX ADMIN — MORPHINE SULFATE 2 MG: 2 INJECTION, SOLUTION INTRAMUSCULAR; INTRAVENOUS at 17:43

## 2019-01-01 RX ADMIN — MORPHINE SULFATE 2 MG: 2 INJECTION, SOLUTION INTRAMUSCULAR; INTRAVENOUS at 00:39

## 2019-01-01 RX ADMIN — DOXYCYCLINE 100 MG: 100 INJECTION, POWDER, LYOPHILIZED, FOR SOLUTION INTRAVENOUS at 05:36

## 2019-01-01 RX ADMIN — THIAMINE HYDROCHLORIDE 100 MG: 100 INJECTION, SOLUTION INTRAMUSCULAR; INTRAVENOUS at 09:23

## 2019-01-01 RX ADMIN — DOXYCYCLINE 100 MG: 100 INJECTION, POWDER, LYOPHILIZED, FOR SOLUTION INTRAVENOUS at 06:04

## 2019-01-01 RX ADMIN — DOXYCYCLINE 100 MG: 100 INJECTION, POWDER, LYOPHILIZED, FOR SOLUTION INTRAVENOUS at 05:46

## 2019-01-01 RX ADMIN — LEVOTHYROXINE SODIUM 125 MCG: 125 TABLET ORAL at 06:02

## 2019-01-01 RX ADMIN — LORAZEPAM 0.5 MG: 2 INJECTION INTRAMUSCULAR; INTRAVENOUS at 17:07

## 2019-01-01 RX ADMIN — MORPHINE SULFATE 2 MG: 2 INJECTION, SOLUTION INTRAMUSCULAR; INTRAVENOUS at 11:25

## 2019-01-01 RX ADMIN — SODIUM CHLORIDE, PRESERVATIVE FREE 10 ML: 5 INJECTION INTRAVENOUS at 09:10

## 2019-01-01 RX ADMIN — IPRATROPIUM BROMIDE AND ALBUTEROL SULFATE 3 ML: .5; 3 SOLUTION RESPIRATORY (INHALATION) at 12:07

## 2019-01-01 RX ADMIN — FUROSEMIDE 40 MG: 10 INJECTION, SOLUTION INTRAMUSCULAR; INTRAVENOUS at 12:10

## 2019-01-01 RX ADMIN — MORPHINE SULFATE 2 MG: 2 INJECTION, SOLUTION INTRAMUSCULAR; INTRAVENOUS at 05:46

## 2019-01-01 RX ADMIN — MORPHINE SULFATE 2 MG: 2 INJECTION, SOLUTION INTRAMUSCULAR; INTRAVENOUS at 11:20

## 2019-01-01 RX ADMIN — MORPHINE SULFATE 2 MG: 2 INJECTION, SOLUTION INTRAMUSCULAR; INTRAVENOUS at 08:45

## 2019-01-01 RX ADMIN — HEPARIN SODIUM 5000 UNITS: 5000 INJECTION INTRAVENOUS; SUBCUTANEOUS at 14:15

## 2019-01-01 RX ADMIN — EPHEDRINE SULFATE 5 MG: 50 INJECTION INTRAMUSCULAR; INTRAVENOUS; SUBCUTANEOUS at 09:40

## 2019-01-01 RX ADMIN — VALACYCLOVIR HYDROCHLORIDE 1000 MG: 500 TABLET, FILM COATED ORAL at 23:42

## 2019-01-01 RX ADMIN — IPRATROPIUM BROMIDE AND ALBUTEROL SULFATE 3 ML: .5; 3 SOLUTION RESPIRATORY (INHALATION) at 18:05

## 2019-01-01 RX ADMIN — SODIUM CHLORIDE, PRESERVATIVE FREE 10 ML: 5 INJECTION INTRAVENOUS at 08:45

## 2019-01-01 RX ADMIN — MORPHINE SULFATE 2 MG: 2 INJECTION, SOLUTION INTRAMUSCULAR; INTRAVENOUS at 04:12

## 2019-01-01 RX ADMIN — FUROSEMIDE 80 MG: 10 INJECTION, SOLUTION INTRAMUSCULAR; INTRAVENOUS at 18:27

## 2019-01-01 RX ADMIN — MORPHINE SULFATE 2 MG: 2 INJECTION, SOLUTION INTRAMUSCULAR; INTRAVENOUS at 23:25

## 2019-01-01 RX ADMIN — MORPHINE SULFATE 2 MG: 2 INJECTION, SOLUTION INTRAMUSCULAR; INTRAVENOUS at 20:13

## 2019-01-01 RX ADMIN — DOXYCYCLINE 100 MG: 100 INJECTION, POWDER, LYOPHILIZED, FOR SOLUTION INTRAVENOUS at 18:24

## 2019-01-01 RX ADMIN — HEPARIN SODIUM 5000 UNITS: 5000 INJECTION INTRAVENOUS; SUBCUTANEOUS at 19:49

## 2019-01-01 RX ADMIN — IPRATROPIUM BROMIDE AND ALBUTEROL SULFATE 3 ML: .5; 3 SOLUTION RESPIRATORY (INHALATION) at 07:35

## 2019-01-01 RX ADMIN — IPRATROPIUM BROMIDE AND ALBUTEROL SULFATE 3 ML: .5; 3 SOLUTION RESPIRATORY (INHALATION) at 00:28

## 2019-01-01 RX ADMIN — DOXYCYCLINE 100 MG: 100 INJECTION, POWDER, LYOPHILIZED, FOR SOLUTION INTRAVENOUS at 05:18

## 2019-01-01 RX ADMIN — PREDNISONE 40 MG: 20 TABLET ORAL at 08:03

## 2019-01-01 RX ADMIN — BUDESONIDE AND FORMOTEROL FUMARATE DIHYDRATE 2 PUFF: 80; 4.5 AEROSOL RESPIRATORY (INHALATION) at 19:12

## 2019-01-01 RX ADMIN — TRAZODONE HYDROCHLORIDE 25 MG: 50 TABLET ORAL at 23:34

## 2019-01-01 RX ADMIN — MORPHINE SULFATE 2 MG: 2 INJECTION, SOLUTION INTRAMUSCULAR; INTRAVENOUS at 20:05

## 2019-01-01 RX ADMIN — PROPOFOL 50 MG: 10 INJECTION, EMULSION INTRAVENOUS at 09:33

## 2019-01-01 RX ADMIN — BUDESONIDE AND FORMOTEROL FUMARATE DIHYDRATE 2 PUFF: 80; 4.5 AEROSOL RESPIRATORY (INHALATION) at 07:25

## 2019-01-01 RX ADMIN — ASPIRIN 81 MG: 81 TABLET, CHEWABLE ORAL at 20:15

## 2019-01-01 RX ADMIN — IPRATROPIUM BROMIDE AND ALBUTEROL SULFATE 3 ML: .5; 3 SOLUTION RESPIRATORY (INHALATION) at 00:06

## 2019-01-01 RX ADMIN — MORPHINE SULFATE 2 MG: 2 INJECTION, SOLUTION INTRAMUSCULAR; INTRAVENOUS at 18:05

## 2019-01-01 RX ADMIN — MORPHINE SULFATE 2 MG: 2 INJECTION, SOLUTION INTRAMUSCULAR; INTRAVENOUS at 11:50

## 2019-01-01 RX ADMIN — IPRATROPIUM BROMIDE AND ALBUTEROL SULFATE 3 ML: .5; 3 SOLUTION RESPIRATORY (INHALATION) at 12:55

## 2019-01-01 RX ADMIN — MORPHINE SULFATE 2 MG: 2 INJECTION, SOLUTION INTRAMUSCULAR; INTRAVENOUS at 17:05

## 2019-01-01 RX ADMIN — CEFAZOLIN SODIUM 2 G: 2 SOLUTION INTRAVENOUS at 09:26

## 2019-01-01 RX ADMIN — ENOXAPARIN SODIUM 30 MG: 30 INJECTION SUBCUTANEOUS at 16:58

## 2019-01-01 RX ADMIN — THIAMINE HYDROCHLORIDE 100 MG: 100 INJECTION, SOLUTION INTRAMUSCULAR; INTRAVENOUS at 09:18

## 2019-01-01 RX ADMIN — MORPHINE SULFATE 2 MG: 2 INJECTION, SOLUTION INTRAMUSCULAR; INTRAVENOUS at 17:36

## 2019-01-01 RX ADMIN — ATORVASTATIN CALCIUM 20 MG: 20 TABLET, FILM COATED ORAL at 19:49

## 2019-01-01 RX ADMIN — MORPHINE SULFATE 2 MG: 2 INJECTION, SOLUTION INTRAMUSCULAR; INTRAVENOUS at 11:52

## 2019-01-01 RX ADMIN — IPRATROPIUM BROMIDE AND ALBUTEROL SULFATE 3 ML: .5; 3 SOLUTION RESPIRATORY (INHALATION) at 06:36

## 2019-01-01 RX ADMIN — ASPIRIN 81 MG: 81 TABLET, CHEWABLE ORAL at 08:03

## 2019-01-01 RX ADMIN — PROPOFOL 20 MG: 10 INJECTION, EMULSION INTRAVENOUS at 09:55

## 2019-01-01 RX ADMIN — QUETIAPINE FUMARATE 12.5 MG: 25 TABLET, FILM COATED ORAL at 20:32

## 2019-01-01 RX ADMIN — MORPHINE SULFATE 2 MG: 2 INJECTION, SOLUTION INTRAMUSCULAR; INTRAVENOUS at 09:18

## 2019-01-01 RX ADMIN — BUDESONIDE AND FORMOTEROL FUMARATE DIHYDRATE 2 PUFF: 80; 4.5 AEROSOL RESPIRATORY (INHALATION) at 06:17

## 2019-01-01 RX ADMIN — THIAMINE HYDROCHLORIDE 100 MG: 100 INJECTION, SOLUTION INTRAMUSCULAR; INTRAVENOUS at 08:52

## 2019-01-01 RX ADMIN — BUDESONIDE AND FORMOTEROL FUMARATE DIHYDRATE 2 PUFF: 80; 4.5 AEROSOL RESPIRATORY (INHALATION) at 06:14

## 2019-01-01 RX ADMIN — IPRATROPIUM BROMIDE AND ALBUTEROL SULFATE 3 ML: .5; 3 SOLUTION RESPIRATORY (INHALATION) at 06:17

## 2019-01-01 RX ADMIN — LORAZEPAM 0.5 MG: 2 INJECTION INTRAMUSCULAR; INTRAVENOUS at 09:18

## 2019-01-01 RX ADMIN — SODIUM CHLORIDE, PRESERVATIVE FREE 10 ML: 5 INJECTION INTRAVENOUS at 20:17

## 2019-01-01 RX ADMIN — IPRATROPIUM BROMIDE AND ALBUTEROL SULFATE 3 ML: .5; 3 SOLUTION RESPIRATORY (INHALATION) at 12:48

## 2019-01-01 RX ADMIN — SODIUM CHLORIDE, POTASSIUM CHLORIDE, SODIUM LACTATE AND CALCIUM CHLORIDE: 600; 310; 30; 20 INJECTION, SOLUTION INTRAVENOUS at 09:26

## 2019-01-01 RX ADMIN — MORPHINE SULFATE 2 MG: 2 INJECTION, SOLUTION INTRAMUSCULAR; INTRAVENOUS at 06:28

## 2019-01-01 RX ADMIN — THIAMINE HYDROCHLORIDE 100 MG: 100 INJECTION, SOLUTION INTRAMUSCULAR; INTRAVENOUS at 18:40

## 2019-01-01 RX ADMIN — LEVOTHYROXINE SODIUM 125 MCG: 125 TABLET ORAL at 05:36

## 2019-01-01 RX ADMIN — FENTANYL CITRATE 25 MCG: 50 INJECTION INTRAMUSCULAR; INTRAVENOUS at 09:31

## 2019-01-01 RX ADMIN — DOXYCYCLINE 100 MG: 100 INJECTION, POWDER, LYOPHILIZED, FOR SOLUTION INTRAVENOUS at 17:32

## 2019-01-01 RX ADMIN — IPRATROPIUM BROMIDE AND ALBUTEROL SULFATE 3 ML: .5; 3 SOLUTION RESPIRATORY (INHALATION) at 19:08

## 2019-01-01 RX ADMIN — THIAMINE HYDROCHLORIDE 100 MG: 100 INJECTION, SOLUTION INTRAMUSCULAR; INTRAVENOUS at 09:10

## 2019-01-01 RX ADMIN — HEPARIN SODIUM 5000 UNITS: 5000 INJECTION INTRAVENOUS; SUBCUTANEOUS at 06:01

## 2019-01-01 RX ADMIN — DOXYCYCLINE 100 MG: 100 INJECTION, POWDER, LYOPHILIZED, FOR SOLUTION INTRAVENOUS at 20:17

## 2019-01-01 RX ADMIN — PREDNISONE 40 MG: 20 TABLET ORAL at 20:16

## 2019-01-01 RX ADMIN — LORAZEPAM 0.5 MG: 2 INJECTION INTRAMUSCULAR; INTRAVENOUS at 15:52

## 2019-01-01 RX ADMIN — MORPHINE SULFATE 2 MG: 2 INJECTION, SOLUTION INTRAMUSCULAR; INTRAVENOUS at 13:03

## 2019-01-01 RX ADMIN — IPRATROPIUM BROMIDE AND ALBUTEROL SULFATE 3 ML: .5; 3 SOLUTION RESPIRATORY (INHALATION) at 01:08

## 2019-01-01 RX ADMIN — DOXYCYCLINE 100 MG: 100 INJECTION, POWDER, LYOPHILIZED, FOR SOLUTION INTRAVENOUS at 17:28

## 2019-01-01 RX ADMIN — LORAZEPAM 0.5 MG: 2 INJECTION, SOLUTION INTRAMUSCULAR; INTRAVENOUS at 03:56

## 2019-01-01 RX ADMIN — ATORVASTATIN CALCIUM 20 MG: 20 TABLET, FILM COATED ORAL at 20:32

## 2019-01-01 RX ADMIN — AZITHROMYCIN MONOHYDRATE 500 MG: 500 INJECTION, POWDER, LYOPHILIZED, FOR SOLUTION INTRAVENOUS at 17:01

## 2019-01-01 RX ADMIN — ATORVASTATIN CALCIUM 20 MG: 20 TABLET, FILM COATED ORAL at 20:16

## 2019-01-01 RX ADMIN — HEPARIN SODIUM 5000 UNITS: 5000 INJECTION INTRAVENOUS; SUBCUTANEOUS at 05:18

## 2019-01-01 RX ADMIN — QUETIAPINE FUMARATE 25 MG: 25 TABLET, FILM COATED ORAL at 13:10

## 2019-01-01 RX ADMIN — HEPARIN SODIUM 5000 UNITS: 5000 INJECTION INTRAVENOUS; SUBCUTANEOUS at 20:32

## 2019-01-01 RX ADMIN — IPRATROPIUM BROMIDE AND ALBUTEROL SULFATE 3 ML: .5; 3 SOLUTION RESPIRATORY (INHALATION) at 12:59

## 2019-01-01 RX ADMIN — LISINOPRIL 10 MG: 10 TABLET ORAL at 18:27

## 2019-01-01 RX ADMIN — FENTANYL CITRATE 25 MCG: 50 INJECTION INTRAMUSCULAR; INTRAVENOUS at 09:49

## 2019-01-01 RX ADMIN — MORPHINE SULFATE 2 MG: 2 INJECTION, SOLUTION INTRAMUSCULAR; INTRAVENOUS at 17:40

## 2019-01-01 RX ADMIN — MORPHINE SULFATE 2 MG: 2 INJECTION, SOLUTION INTRAMUSCULAR; INTRAVENOUS at 23:06

## 2019-01-01 RX ADMIN — MORPHINE SULFATE 2 MG: 2 INJECTION, SOLUTION INTRAMUSCULAR; INTRAVENOUS at 19:17

## 2019-01-01 RX ADMIN — EPHEDRINE SULFATE 5 MG: 50 INJECTION INTRAMUSCULAR; INTRAVENOUS; SUBCUTANEOUS at 10:00

## 2019-01-01 RX ADMIN — MORPHINE SULFATE 2 MG: 2 INJECTION, SOLUTION INTRAMUSCULAR; INTRAVENOUS at 13:31

## 2019-01-01 RX ADMIN — IPRATROPIUM BROMIDE AND ALBUTEROL SULFATE 3 ML: .5; 3 SOLUTION RESPIRATORY (INHALATION) at 06:09

## 2019-01-01 RX ADMIN — LEVOTHYROXINE SODIUM 125 MCG: 125 TABLET ORAL at 18:27

## 2019-01-01 RX ADMIN — BUDESONIDE AND FORMOTEROL FUMARATE DIHYDRATE 2 PUFF: 80; 4.5 AEROSOL RESPIRATORY (INHALATION) at 06:36

## 2019-01-01 RX ADMIN — HALOPERIDOL 5 MG: 5 TABLET ORAL at 23:34

## 2019-01-01 RX ADMIN — MORPHINE SULFATE 2 MG: 2 INJECTION, SOLUTION INTRAMUSCULAR; INTRAVENOUS at 00:21

## 2019-01-01 RX ADMIN — MORPHINE SULFATE 2 MG: 2 INJECTION, SOLUTION INTRAMUSCULAR; INTRAVENOUS at 05:42

## 2019-01-01 RX ADMIN — FUROSEMIDE 40 MG: 10 INJECTION, SOLUTION INTRAMUSCULAR; INTRAVENOUS at 20:16

## 2019-01-01 RX ADMIN — IPRATROPIUM BROMIDE AND ALBUTEROL SULFATE 3 ML: .5; 3 SOLUTION RESPIRATORY (INHALATION) at 16:26

## 2019-01-01 RX ADMIN — MORPHINE SULFATE 2 MG: 2 INJECTION, SOLUTION INTRAMUSCULAR; INTRAVENOUS at 23:45

## 2019-01-01 RX ADMIN — HEPARIN SODIUM 5000 UNITS: 5000 INJECTION INTRAVENOUS; SUBCUTANEOUS at 20:15

## 2019-01-01 RX ADMIN — IPRATROPIUM BROMIDE AND ALBUTEROL SULFATE 3 ML: .5; 3 SOLUTION RESPIRATORY (INHALATION) at 19:12

## 2019-01-01 RX ADMIN — IPRATROPIUM BROMIDE AND ALBUTEROL SULFATE 3 ML: .5; 3 SOLUTION RESPIRATORY (INHALATION) at 07:25

## 2019-01-01 RX ADMIN — IPRATROPIUM BROMIDE AND ALBUTEROL SULFATE 3 ML: .5; 3 SOLUTION RESPIRATORY (INHALATION) at 12:19

## 2019-01-01 RX ADMIN — SODIUM CHLORIDE, PRESERVATIVE FREE 10 ML: 5 INJECTION INTRAVENOUS at 08:04

## 2019-01-01 RX ADMIN — MORPHINE SULFATE 2 MG: 2 INJECTION, SOLUTION INTRAMUSCULAR; INTRAVENOUS at 15:13

## 2019-01-01 RX ADMIN — MORPHINE SULFATE 2 MG: 2 INJECTION, SOLUTION INTRAMUSCULAR; INTRAVENOUS at 03:50

## 2019-01-01 RX ADMIN — DOXYCYCLINE 100 MG: 100 INJECTION, POWDER, LYOPHILIZED, FOR SOLUTION INTRAVENOUS at 05:37

## 2019-01-01 RX ADMIN — MORPHINE SULFATE 2 MG: 2 INJECTION, SOLUTION INTRAMUSCULAR; INTRAVENOUS at 05:37

## 2019-01-01 RX ADMIN — MORPHINE SULFATE 2 MG: 2 INJECTION, SOLUTION INTRAMUSCULAR; INTRAVENOUS at 13:10

## 2019-01-01 RX ADMIN — DIPHENHYDRAMINE HYDROCHLORIDE 12.5 MG: 50 INJECTION INTRAMUSCULAR; INTRAVENOUS at 03:56

## 2019-01-01 RX ADMIN — HEPARIN SODIUM 5000 UNITS: 5000 INJECTION INTRAVENOUS; SUBCUTANEOUS at 05:35

## 2019-01-01 RX ADMIN — BUDESONIDE AND FORMOTEROL FUMARATE DIHYDRATE 2 PUFF: 80; 4.5 AEROSOL RESPIRATORY (INHALATION) at 18:33

## 2019-01-01 RX ADMIN — MORPHINE SULFATE 2 MG: 2 INJECTION, SOLUTION INTRAMUSCULAR; INTRAVENOUS at 00:16

## 2019-01-01 RX ADMIN — MORPHINE SULFATE 2 MG: 2 INJECTION, SOLUTION INTRAMUSCULAR; INTRAVENOUS at 17:28

## 2019-01-01 RX ADMIN — CEFTRIAXONE 1 G: 1 INJECTION, POWDER, FOR SOLUTION INTRAMUSCULAR; INTRAVENOUS at 16:17

## 2019-01-01 RX ADMIN — IPRATROPIUM BROMIDE AND ALBUTEROL SULFATE 3 ML: .5; 3 SOLUTION RESPIRATORY (INHALATION) at 06:14

## 2019-01-01 RX ADMIN — THIAMINE HYDROCHLORIDE 100 MG: 100 INJECTION, SOLUTION INTRAMUSCULAR; INTRAVENOUS at 09:41

## 2019-01-01 RX ADMIN — PROPOFOL 20 MG: 10 INJECTION, EMULSION INTRAVENOUS at 09:49

## 2019-01-01 RX ADMIN — SODIUM CHLORIDE, POTASSIUM CHLORIDE, SODIUM LACTATE AND CALCIUM CHLORIDE 125 ML/HR: 600; 310; 30; 20 INJECTION, SOLUTION INTRAVENOUS at 08:39

## 2019-01-01 RX ADMIN — DOXYCYCLINE 100 MG: 100 INJECTION, POWDER, LYOPHILIZED, FOR SOLUTION INTRAVENOUS at 16:57

## 2019-01-01 RX ADMIN — ENOXAPARIN SODIUM 30 MG: 30 INJECTION SUBCUTANEOUS at 17:27

## 2019-01-01 RX ADMIN — IPRATROPIUM BROMIDE AND ALBUTEROL SULFATE 3 ML: .5; 3 SOLUTION RESPIRATORY (INHALATION) at 18:33

## 2019-01-01 RX ADMIN — MORPHINE SULFATE 2 MG: 2 INJECTION, SOLUTION INTRAMUSCULAR; INTRAVENOUS at 16:58

## 2019-01-01 RX ADMIN — MORPHINE SULFATE 2 MG: 10 SOLUTION ORAL at 01:31

## 2019-01-15 NOTE — PROGRESS NOTES
[facility] CARDIOLOGY  [address]  [place] [address]  Phone: [phone]  Fax: [phone]    01/15/2019    Chief Complaint   Patient presents with   • Peripheral Vascular Disease        History:   Onel Erickson is a 78 y.o. male seen in consultation, referred by Dr.Ann Karen Davis DPM  for PAD. Patient presents today with a sore on his right lower extremity on the outer aspect mid foot.  Patient does have pain in both legs on walking but he does not do much walking.  His past history include prior amputation of the left hallux.  He had left sided bypass to save amputation which has worked well so far in 2012 by Dr. Browne in [facility].  He then had a stent to the right femoral by Dr. Browne in 2013.  He has difficulty going to [place] and thus wants work done in [facility].      Past Medical History:   Diagnosis Date   • Arthritis    • CAD (coronary artery disease)     Sees Dr. Harley   • COPD (chronic obstructive pulmonary disease) (CMS/[facility])    • Former smoker     Stopped in 2012   • Hernia of abdominal wall    • History of TIAs    • Hyperlipidemia    • Hypertension    • Hypothyroidism    • Inguinal hernia    • PAD (peripheral artery disease) (CMS/[facility])    • Ruptured lumbar disc    • Scrotal hernia     Left   • Stroke (CMS/[facility]) 2011    10 YEARS AGO   • Ventral hernia        Past Surgical History:   Procedure Laterality Date   • AMPUTATION  06/2012    left great toe amputation   • ARTERIAL BYPASS SURGERY  10/2012    left leg - groin to ankle; Dr. Browne; [facility].   • BACK SURGERY      age 30; spinal fusion   • CARDIAC SURGERY  08/2012    Open heart bypass; [facility]   • CAROTID STENT Bilateral    • CATARACT EXTRACTION  03/07/2016   • CORONARY ARTERY BYPASS GRAFT  2012    x5   • HERNIA REPAIR      age 35; inguinal hernia   • INGUINAL HERNIA REPAIR Left 9/15/2016    Procedure: INGUINAL HERNIA REPAIR;  Surgeon: Henry Bullock MD;   Location: [facility] OR;  Service:    • INGUINAL HERNIA REPAIR Left 2016    Procedure: INGUINAL HERNIA REPAIR;  Surgeon: Henry Bullock MD;  Location: [facility] OR;  Service:    • LUNG BIOPSY     • VENTRAL/INCISIONAL HERNIA REPAIR N/A 9/15/2016    Procedure: VENTRAL/INCISIONAL HERNIA REPAIR;  Surgeon: Henry Bullock MD;  Location: [facility] OR;  Service:         Review of Systems:  Please see HPI  Constitution: No chills, no rigors, no unexplained weight loss or weight gain  Eyes:  No diplopia, no blurred vision, no loss of vision, conjunctiva is pink and sclera is anicteric  ENT:  No tinnitus, no otorrhea, no epistaxis, no sore throat   Respiratory: No cough, no hemoptysis  Cardiovascular: see HPI  Gastrointestinal: No nausea, no vomiting, no hematemesis, no diarrhea or constipation, no melena  Genitourinary: No frequency of dysuria no hematuria  Integument: No pruritis and  no skin rash  Hematologic / Lymphatic: No excessive bleeding, easy bruising, fatigue, lymphadenopathy and petechiae  Musculoskeletal: No joint pain, joint stiffness, joint swelling, muscle pain, muscle weakness and neck pain  Neurological: No dizziness, headaches, light headedness, seizures and vertigo  Endocrine: No frequent urination and nocturia, temperature intolerance, weight gain, unintended and weight loss, unintended        Past Social History:  Social History     Socioeconomic History   • Marital status: Unknown     Spouse name: Not on file   • Number of children: Not on file   • Years of education: Not on file   • Highest education level: Not on file   Tobacco Use   • Smoking status: Former Smoker     Packs/day: 1.00     Years: 30.00     Pack years: 30.00     Types: Cigarettes     Last attempt to quit: 2006     Years since quittin.3   • Smokeless tobacco: Never Used   Substance and Sexual Activity   • Alcohol use: No   • Drug use: No   • Sexual activity: Defer       Past Family History:  Family History   Problem Relation  Age of Onset   • Alzheimer's disease Mother    • Heart disease Father        Current Outpatient Medications   Medication Sig Dispense Refill   • aspirin 81 MG EC tablet Take 1 tablet by mouth Daily. 30 tablet 5   • atorvastatin (LIPITOR) 20 MG tablet Take 1 tablet by mouth Daily. 30 tablet 5   • levothyroxine (SYNTHROID, LEVOTHROID) 125 MCG tablet Take 1 tablet by mouth Daily. 30 tablet 2   • lisinopril (PRINIVIL,ZESTRIL) 10 MG tablet Take 1 tablet by mouth Daily. 30 tablet 5   • Multiple Vitamins-Minerals (PX COMPLETE SENIOR MULTIVITS PO) Take  by mouth.     • aclidinium bromide (TUDORZA PRESSAIR) 400 MCG/ACT aerosol powder  powder for inhalation Inhale 1 puff Daily. 1 each 5   • azithromycin (ZITHROMAX) 250 MG tablet Take 2 tablets the first day, then 1 tablet daily for 4 days. 6 tablet 0   • clotrimazole (LOTRIMIN) 1 % cream Apply  topically 2 (Two) Times a Day. Over the toenails 45 g 0   • Fluticasone Furoate-Vilanterol 100-25 MCG/INH aerosol powder  Inhale 1 puff Daily. 31 each 5   • ibuprofen (ADVIL,MOTRIN) 200 MG tablet Take 200 mg by mouth Every 6 (Six) Hours As Needed for mild pain (1-3).       No current facility-administered medications for this visit.         Allergies   Allergen Reactions   • Beta Adrenergic Blockers Other (See Comments)     Bottoms BP out       Objective:  Vitals:    01/15/19 1605   BP: 110/68   Pulse: 85         Comfortable NAD  PERRL, conjunctiva clear  Neck supple, no JVD or thyromegaly appreciated  S1/S2 RRR, no m/r/g  Lungs CTA B, normal effort  Abdomen S/NT/ND (+) BS, no HSM appreciated  Extremities warm, no clubbing, cyanosis, or edema  Normal gait  No visible or palpable skin lesions  A/Ox4, mood and affect appropriate  Pulse exam:   Feet are warm bilateral  Capillary refill is normal  Evidence of a small ulcer on the outer aspect of the right midfoot with a scab  Left helix is amputated with well-healed stump  PULSES  Right DP and PT are 0+ and Left DP and PT are 0+    DATA:               Results for orders placed during the hospital encounter of 06/26/15   ULTRASOUND VENOUS LOWER EXTREMITY BILATERAL    Narrative EXAMINATION: NI DUPLEX KIEL BILAT LEGS-      CLINICAL INDICATION:     Pain and Swelling     TECHNIQUE: Multiplanar gray scale and Doppler vascular sonographic  imaging of the deep veins of left and right lower extremity, without and  with compression.      COMPARISON: NONE      FINDINGS: The visualized deep veins demonstrate flow and are  compressible. No evidence of deep venous thrombosis.           IMPRESSION- No DVT.             Reading Radiologist- AWAIS MCCLOUD       Releasing Radiologist- AWAIS MCCLOUD       Released Date Time- 06/26/15 4956       - MIAN   ------------------------------------------------------------------------------       Procedures     A/P:  Severe peripheral vascular disease with absence of pulses on both lower extremity.  History of peripheral bypass in 2012 on the left side  History of stenting to the right femoral on the right side  Status post amputation of the left hallux 2012      Plan  Schedule arteriogram with runoff in 2 weeks    Patient's Body mass index is 15.51 kg/m². BMI is below normal parameters. Recommendations include: treating the underlying disease process.       No diagnosis found.     Thank you for allowing me to participate in the care of Onel Erickson. Feel free to contact me directly with any further questions or concerns.

## 2019-01-15 NOTE — TELEPHONE ENCOUNTER
----- Message from Deloris Lewis MD sent at 1/14/2019 10:23 PM EST -----  Please let Onel know to increase fluids. His kidney function is stressed again. Thanks.      Reny notified & verbalized understanding.

## 2019-01-15 NOTE — PROGRESS NOTES
"Onel Erickson     VITALS: Blood pressure 95/61, pulse 70, height 167.6 cm (65.98\"), weight 45.4 kg (100 lb).    Subjective  Chief Complaint:   Chief Complaint   Patient presents with   • Cough   • Nasal Congestion        History of Present Illness:  Patient is a 78 y.o. male with a medical history significant for CAD and COPD who presents to clinic secondary to medical followup. He continues to cough on and off. This has been happening for approximately 8 months. He has been treated with antibiotics without any success. CXR is okay. He has had a history of lung nodules. We have tried to continue workup on them. Wife has refused - states that he has has workup on lung nodules \"years ago\" at  and they were considered benign fungal nodules. Have attempted to get old records without success.     Patient also has a history of hyperlipidemia and is currently on atorvastatin 20 mg orally daily. Denies any side effects of the medications. Last lipid panel in 7/2018 and was ok. Denies any muscle weakness, jaundice or itching.   Low cholesterol diet: Yes    Patient does have a history of hypothyroidism and is currently on synthroid 175 mcg orally daily. He is doing well on this medication. Denies any side effects. Denies fatigue, dizziness, palpitations, changes in weight, hair, or nails. Last thyroid panel was in 11/2018 and was abnormal.     Patient has a history of COPD and is currently on Advair 250/50 1 puff twice daily and Spiriva daily. Denies any side effects of the medications. Occasional shortness of breath, but no other coughing, wheezing, or night coughing. Reports that in the past CXR has shown \"fungus spots on his lungs\".  Exacerbation: No  Last utilized albuterol: Does not have albuterol    Patient has a history of CAD. He sees Dr. Harley, and is currently on Aspirin 81 mg daily. He does have an extensive history, including a cath, 5 bypasses, along with a history of PAD with stents in both legs " along with a left arterial bypass in his lower extremity. In the past, he was on plavix, digoxin, imdur, and lasix, but Dr. Harley has since taken him off of these medications as he has been extremely stable in the past. Last saw Dr. Harley some months ago. He quit smoking in 2012.  History of stroke: Yes  No complaints about any of the medications.    The following portions of the patient's history were reviewed and updated as appropriate: allergies, current medications, past family history, past medical history, past social history, past surgical history and problem list.    Past Medical History  Past Medical History:   Diagnosis Date   • Arthritis    • CAD (coronary artery disease)     Sees Dr. Harley   • COPD (chronic obstructive pulmonary disease) (CMS/[facility])    • Former smoker     Stopped in 2012   • Hernia of abdominal wall    • History of TIAs    • Hyperlipidemia    • Hypertension    • Hypothyroidism    • Inguinal hernia    • PAD (peripheral artery disease) (CMS/[facility])    • Ruptured lumbar disc    • Scrotal hernia     Left   • Stroke (CMS/[facility]) 2011    10 YEARS AGO   • Ventral hernia        Review of Systems  Constitutional: Denies any recent history of HAs, dizziness, fevers, chills, itching.  Eyes: Denies any changes in vision. Denies any blurry vision or diplopia.  Ears, Nose, Mouth, Throat: Denies any sore throat, rhinorrhea.  Cardiovascular: Denies any chest pain, pressure, or palpitations.  Respiratory: Denies any shortness of breath or wheezing.  Gastrointestinal: Denies any abdominal pain, nausea, vomiting, diarrhea, or constipation.  Genitourinary: Denies any changes in urination.  Musculoskeletal: Denies any muscle weakness.  Skin and/or breasts: Denies any rashes.  Neurological: Denies any changes in balance or gait.  Psychiatric: Denies any anxiety, depression, or insomnia. Denies any suicidal or homicidal ideations.  Endocrine: Denies any heat or cold intolerance. Denies any voice  changes, polydipsia, or polyuria.  Hematologic/Lymphatic: Denies any anemia or easy bruising.    Surgical History  Past Surgical History:   Procedure Laterality Date   • AMPUTATION  2012    left great toe amputation   • ARTERIAL BYPASS SURGERY  10/2012    left leg - groin to ankle; Dr. Browne; [facility].   • BACK SURGERY      age 30; spinal fusion   • CARDIAC SURGERY  2012    Open heart bypass; [facility]   • CAROTID STENT Bilateral    • CATARACT EXTRACTION  2016   • CORONARY ARTERY BYPASS GRAFT  2012    x5   • HERNIA REPAIR      age 35; inguinal hernia   • INGUINAL HERNIA REPAIR Left 9/15/2016    Procedure: INGUINAL HERNIA REPAIR;  Surgeon: Henry Bullock MD;  Location: [facility];  Service:    • INGUINAL HERNIA REPAIR Left 2016    Procedure: INGUINAL HERNIA REPAIR;  Surgeon: Henry Bullock MD;  Location: [facility];  Service:    • LUNG BIOPSY     • VENTRAL/INCISIONAL HERNIA REPAIR N/A 9/15/2016    Procedure: VENTRAL/INCISIONAL HERNIA REPAIR;  Surgeon: Henry Bullock MD;  Location: [facility] OR;  Service:        Family History  Family History   Problem Relation Age of Onset   • Alzheimer's disease Mother    • Heart disease Father        Social History  Social History     Socioeconomic History   • Marital status: Unknown     Spouse name: Not on file   • Number of children: Not on file   • Years of education: Not on file   • Highest education level: Not on file   Social Needs   • Financial resource strain: Not on file   • Food insecurity - worry: Not on file   • Food insecurity - inability: Not on file   • Transportation needs - medical: Not on file   • Transportation needs - non-medical: Not on file   Occupational History   • Not on file   Tobacco Use   • Smoking status: Former Smoker     Packs/day: 1.00     Years: 30.00     Pack years: 30.00     Types: Cigarettes     Last attempt to quit: 2006     Years since quittin.3   • Smokeless tobacco: Never Used   Substance and  Sexual Activity   • Alcohol use: No   • Drug use: No   • Sexual activity: Defer   Other Topics Concern   • Not on file   Social History Narrative   • Not on file       Objective  Physical Exam  Gen: Patient in NAD. Pleasant and answers appropriately. A&Ox3.    Skin: Warm and dry with normal turgor. No purpura, rashes, or unusual pigmentation noted. Hair is normal in appearance and distribution.    HEENT: NC/AT. No lesions noted. Conjunctiva clear, sclera nonicteric. PERRL. EOMI without nystagmus or strabismus. Fundi appear benign. No hemorrhages or exudates of eyes. Auditory canals are patent bilaterally without lesions. TMs intact,  nonerythematous, nonbulging without lesions. Nasal mucosa pink, nonerythematous, and nonedematous. Frontal and maxillary sinuses are nontender. O/P nonerythematous and moist without exudate.    Neck: Supple without lymph nodes palpated. FROM.     Lungs: CTA B/L without rales, rhonchi, crackles, or wheezes.    Heart: RRR. S1 and S2 normal. No S3 or S4. No MRGT.    Abd: Soft, nontender,nondistended. (+)BSx4 quadrants.     Extrem: No CCE. Radial pulses 2+/4 and equal B/L. FROMx4. No bone, joint, or muscle tenderness noted.    Neuro: No focal motor/sensory deficits.    Procedures    Assessment/Plan  Onel Erickson is a 78 y.o. here for medical followup.  Diagnoses and all orders for this visit:    Renal insufficiency  Will check today.    Cough  -     Comprehensive Metabolic Panel  -     CBC & Differential  -     CBC Auto Differential  -     Osmolality, Calculated; Future  -     Osmolality, Calculated    History of multiple pulmonary nodules  -     CT Chest With Contrast  Discussed importance of getting a CT chest with patient especially with the ongoing cough. Discussed that the CT now compared to the CT from previous workups could be different and it is important to get a new CT to evaluate status of the nodules and to continue to find a reason for the cough, especially with the  decreasing weight.     Thyroid function test abnormal  -     US Thyroid  Secondary to continued abnormal function tests, will get thyroid U/S for evaluation.    Patient's Body mass index is 16.15 kg/m². BMI is below normal parameters. Recommendations include: treating the underlying disease process.     Findings and plans discussed with patient who verbalizes understanding and agreement. Will followup with patient once results are in. Patient tofollowup at clinic PRN or in one month for further medical followup.    Deloris Lewis MD

## 2019-01-21 PROBLEM — Z89.429 HISTORY OF AMPUTATION OF TOE (HCC): Status: ACTIVE | Noted: 2019-01-01

## 2019-01-22 NOTE — TELEPHONE ENCOUNTER
----- Message from Deloris Lewis MD sent at 1/22/2019 12:09 AM EST -----  Please call.   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 They are calcified and are of no worries. The new nodules that we were worried about are gone. However - he has emphysema and advanced COPD - will need to work on inhalers at his new visit. Also, he has a widened aorta and chronic pancreatitis. Will discuss if he is interested in the workup of his aorta at his next visit. The chronic pancreatitis is probably what is decreasing his appetite - will discuss what we can do about it at his next visit.    Spoke with Reny & she verbalized understanding of all.

## 2019-02-14 NOTE — TELEPHONE ENCOUNTER
Called pts wife Reny, On hippa and advised that we rescheduled pts appt that he missed. Appt confirmed for 2/21/2019 @945 with Dr. Lewis.

## 2019-02-22 NOTE — TELEPHONE ENCOUNTER
----- Message from Deloris Lewis MD sent at 2/22/2019  8:51 AM EST -----  Labs stable. Okay to either call or send letter to patient. Thanks.      Stable letter mailed.

## 2019-02-27 NOTE — TELEPHONE ENCOUNTER
"Patient called wanting to know about \"scan of arteries in lower leg.\" He talked with Ruby who has given me this message. I have looked back over my records and it appears he \"no showed\" his procedure that was scheduled for 1/21/19.    I have attempted to call patient back to reschedule this and there was no answer.  No answering machine or voicemail picked up to leave message.   "

## 2019-03-11 PROBLEM — D69.6 THROMBOCYTOPENIA (HCC): Status: ACTIVE | Noted: 2019-01-01

## 2019-03-12 NOTE — PROGRESS NOTES
"Onel Erickson     VITALS: Blood pressure 151/82, pulse 68, temperature 97.5 °F (36.4 °C), temperature source Oral, height 172.7 cm (67.99\"), weight 48.5 kg (107 lb), SpO2 95 %.    Subjective  Chief Complaint:   Chief Complaint   Patient presents with   • Diarrhea        History of Present Illness:  Patient is a 78 y.o. male with a medical history significant for CAD and COPD who presents to clinic secondary to medical followup.   No new or acute concerns today. Patient is doing well. He continues to cough on and off. This has been happening for approximately a year. He has been treated with antibiotics without any success. CXR is okay. He has had a history of lung nodules. We have tried to continue workup on them. Wife has refused - states that he has has workup on lung nodules \"years ago\" at  and they were considered benign fungal nodules. Have attempted to get old records without success.   He did have a CT scan done since his last appointment.  Results are benign but it did show a widened aorta and chronic pancreatitis.  He does complain of a little diarrhea today.  He states that his stools are looser but not watery.  He says that he may have 1 or 2 episodes a day.  This has been going on for a while.    Patient also has a history of hyperlipidemia and is currently on atorvastatin 20 mg orally daily. Denies any side effects of the medications. Last lipid panel in 7/2018 and was ok. Denies any muscle weakness, jaundice or itching.   Low cholesterol diet: Yes    Patient does have a history of hypothyroidism and is currently on synthroid 175 mcg orally daily. He is doing well on this medication. Denies any side effects. Denies fatigue, dizziness, palpitations, changes in weight, hair, or nails. Last thyroid panel was in 11/2018 and was abnormal.     Patient has a history of COPD and is currently on Advair 250/50 1 puff twice daily and Spiriva daily. Denies any side effects of the medications. " "Occasional shortness of breath, but no other coughing, wheezing, or night coughing. Reports that in the past CXR has shown \"fungus spots on his lungs\".  Exacerbation: No  Last utilized albuterol: Does not have albuterol    Patient has a history of CAD. He sees Dr. Harley, and is currently on Aspirin 81 mg daily. He does have an extensive history, including a cath, 5 bypasses, along with a history of PAD with stents in both legs along with a left arterial bypass in his lower extremity. In the past, he was on plavix, digoxin, imdur, and lasix, but Dr. Harley has since taken him off of these medications as he has been extremely stable in the past. Last saw Dr. Harley some months ago. He quit smoking in 2012.  Most recently, his podiatrist sent him to Dr. Fuentes for claudication.  He has a history of severe peripheral vascular disease with absence of pulses on both lower extremities with a history of peripheral bypass in 2012 on the left side And a history of stenting to the right femoral on the right side.  Status post amputation of the left hallux 2012.  Dr. Fuentes had scheduled him for an arteriogram runoff in January 2019, but Onel had no showed.  History of stroke: Yes    No complaints about any of the medications.    The following portions of the patient's history were reviewed and updated as appropriate: allergies, current medications, past family history, past medical history, past social history, past surgical history and problem list.    Past Medical History  Past Medical History:   Diagnosis Date   • Arthritis    • CAD (coronary artery disease)     Sees Dr. Harley   • COPD (chronic obstructive pulmonary disease) (CMS/[facility])    • Former smoker     Stopped in 2012   • Hernia of abdominal wall    • History of TIAs    • Hyperlipidemia    • Hypertension    • Hypothyroidism    • Inguinal hernia    • PAD (peripheral artery disease) (CMS/[facility])    • Ruptured lumbar disc    • Scrotal hernia     Left   • " Stroke (CMS/[facility]) 2011    10 YEARS AGO   • Ventral hernia        Review of Systems  Constitutional: Denies any recent history of HAs, dizziness, fevers, chills, itching.  Eyes: Denies any changes in vision. Denies any blurry vision or diplopia.  Ears, Nose, Mouth, Throat: Denies any sore throat, rhinorrhea, or cough.  Cardiovascular: Denies any chest pain, pressure, or palpitations.  Respiratory: Denies any shortness of breath or wheezing.  Gastrointestinal: Denies any abdominal pain, nausea, vomiting, or constipation.  Genitourinary: Denies any changes in urination.  Musculoskeletal: Denies any muscle weakness.  Skin and/or breasts: Denies any rashes.  Neurological: Denies any changes in balance or gait.  Psychiatric: Denies any anxiety, depression, or insomnia. Denies any suicidal or homicidal ideations.  Endocrine: Denies any heat or cold intolerance. Denies any voice changes, polydipsia, or polyuria.  Hematologic/Lymphatic: Denies any anemia or easy bruising.    Surgical History  Past Surgical History:   Procedure Laterality Date   • AMPUTATION  06/2012    left great toe amputation   • ARTERIAL BYPASS SURGERY  10/2012    left leg - groin to ankle; Dr. Browne; [facility].   • BACK SURGERY      age 30; spinal fusion   • CARDIAC SURGERY  08/2012    Open heart bypass; [facility]   • CAROTID STENT Bilateral    • CATARACT EXTRACTION  03/07/2016   • CORONARY ARTERY BYPASS GRAFT  2012    x5   • HERNIA REPAIR      age 35; inguinal hernia   • INGUINAL HERNIA REPAIR Left 9/15/2016    Procedure: INGUINAL HERNIA REPAIR;  Surgeon: Henry Bullock MD;  Location: [facility];  Service:    • INGUINAL HERNIA REPAIR Left 11/18/2016    Procedure: INGUINAL HERNIA REPAIR;  Surgeon: Henry Bullock MD;  Location: [facility] OR;  Service:    • LUNG BIOPSY  2012   • VENTRAL/INCISIONAL HERNIA REPAIR N/A 9/15/2016    Procedure: VENTRAL/INCISIONAL HERNIA REPAIR;  Surgeon: Henry Bullock MD;  Location: [facility] OR;  Service:         Family History  Family History   Problem Relation Age of Onset   • Alzheimer's disease Mother    • Heart disease Father        Social History  Social History     Socioeconomic History   • Marital status: Unknown     Spouse name: Not on file   • Number of children: Not on file   • Years of education: Not on file   • Highest education level: Not on file   Social Needs   • Financial resource strain: Not on file   • Food insecurity - worry: Not on file   • Food insecurity - inability: Not on file   • Transportation needs - medical: Not on file   • Transportation needs - non-medical: Not on file   Occupational History   • Not on file   Tobacco Use   • Smoking status: Former Smoker     Packs/day: 1.00     Years: 30.00     Pack years: 30.00     Types: Cigarettes     Last attempt to quit: 2006     Years since quittin.4   • Smokeless tobacco: Never Used   Substance and Sexual Activity   • Alcohol use: No   • Drug use: No   • Sexual activity: Defer   Other Topics Concern   • Not on file   Social History Narrative   • Not on file       Objective  Physical Exam    Gen: Patient in NAD. Pleasant and answers appropriately. A&Ox3.    Skin: Warm and dry with normal turgor. No purpura, rashes, or unusual pigmentation noted. Hair is normal in appearance and distribution.    HEENT: NC/AT. No lesions noted. Conjunctiva clear, sclera nonicteric. PERRL. EOMI without nystagmus or strabismus. Fundi appear benign. No hemorrhages or exudates of eyes. Auditory canals are patent bilaterally without lesions. TMs intact,  nonerythematous, nonbulging without lesions. Nasal mucosa pink, nonerythematous, and nonedematous. Frontal and maxillary sinuses are nontender. O/P nonerythematous and moist without exudate.    Neck: Supple without lymph nodes palpated. FROM.     Lungs: CTA B/L without rales, rhonchi, crackles, or wheezes.    Heart: RRR. S1 and S2 normal. No S3 or S4. No MRGT.    Abd: Soft, nontender,nondistended. (+)BSx4  quadrants.     Extrem: No CCE. Radial pulses 2+/4 and equal B/L. FROMx4. No bone, joint, or muscle tenderness noted.    Neuro: No focal motor/sensory deficits.    Procedures    Assessment/Plan  Onel Erickson is a 78 y.o. here for medical followup.  Diagnoses and all orders for this visit:    Other specified hypothyroidism  -     TSH  -     T4, Free  -     levothyroxine (SYNTHROID, LEVOTHROID) 125 MCG tablet; Take 1 tablet by mouth Daily.  Check thyroid panel today. Continue synthroid 125 mcg orally daily.     Renal insufficiency  -     Comprehensive Metabolic Panel  -     CBC & Differential  -     CBC Auto Differential  -     Osmolality, Calculated; Future  -     Osmolality, Calculated  Has history.  We will check today.    History of thrombocytopenia  -     CBC & Differential  -     CBC Auto Differential  History of.  Will check CBC today.  Uncertain etiology.     Hypertension  Elevated today. Continue lisinopril 10 mg orally daily.  Continue to monitor.  Recheck at next visit.        Hyperlipidemia  Stable. Continue atorvastatin 20 mg orally daily. Next lipids 7/2019.      COPD  Stable. Continue Tudorza daily and Breo 2 puffs BID.        Chronic pancreatitis  Most likely the etiology to his diarrhea.  Continue to monitor.  May benefit from some creon?    Widened aorta  Discussed with patient to discuss with interventional cardiology.    Preventative Medicine  PSA 10/2018 WNL. Declines pneumo vaccines. Flu shot declined.    Patient's Body mass index is 16.27 kg/m². BMI is below normal parameters. Recommendations include: treating the underlying disease process.     Findings and plans discussed with patient who verbalizes understanding and agreement. Will followup with patient once results are in. Patient to followup at clinic PRN or in 1 month for further medical followup.    Deloris Lewis MD

## 2019-03-19 NOTE — TELEPHONE ENCOUNTER
S/w patient's wife who was concerned about bone being exposed in his foot. She wanted to know if there was any way for Onel to be seen tomorrow morning or afternoon. I explained that the only reason we had the opening today was due to another patient cancelling right before they called. Dr. Fuentes would not be in office tomorrow due to spending the day in the cath lab doing procedures and he was booked completely for Thursday. The next day he would be back would be April 1st. She states that she will try and get him here today for the 3:30 appointment but whoever is bringing them may have to  kids at school first. I told her that I would keep Onel on the schedule for this afternoon just in case.

## 2019-03-19 NOTE — PROGRESS NOTES
[facility] CARDIOLOGY  [address]  [place] [address]  Phone: [phone]  Fax: [phone]    03/19/2019    Chief Complaint   Patient presents with   • Peripheral Vascular Disease        History:   Onel Erickson is a 78 y.o. male seen in follow-up for PVD. Patient's wife called in today stating that his bone was exposed now on the left foot. He was scheduled for an Arteriogram on 01/28/19 and was a no show. The patient states his left big toe has been amputated and now the toe next to it is giving him the trouble.     Past Medical History:   Diagnosis Date   • Arthritis    • CAD (coronary artery disease)     Sees Dr. Harley   • COPD (chronic obstructive pulmonary disease) (CMS/[facility])    • Former smoker     Stopped in 2012   • Hernia of abdominal wall    • History of TIAs    • Hyperlipidemia    • Hypertension    • Hypothyroidism    • Inguinal hernia    • PAD (peripheral artery disease) (CMS/[facility])    • Ruptured lumbar disc    • Scrotal hernia     Left   • Stroke (CMS/[facility]) 2011    10 YEARS AGO   • Ventral hernia        Past Surgical History:   Procedure Laterality Date   • AMPUTATION  06/2012    left great toe amputation   • ARTERIAL BYPASS SURGERY  10/2012    left leg - groin to ankle; Dr. Browne; [facility].   • BACK SURGERY      age 30; spinal fusion   • CARDIAC SURGERY  08/2012    Open heart bypass; [facility]   • CAROTID STENT Bilateral    • CATARACT EXTRACTION  03/07/2016   • CORONARY ARTERY BYPASS GRAFT  2012    x5   • HERNIA REPAIR      age 35; inguinal hernia   • INGUINAL HERNIA REPAIR Left 9/15/2016    Procedure: INGUINAL HERNIA REPAIR;  Surgeon: Henry Bullock MD;  Location: [facility] OR;  Service:    • INGUINAL HERNIA REPAIR Left 11/18/2016    Procedure: INGUINAL HERNIA REPAIR;  Surgeon: Henry Bullock MD;  Location: [facility] OR;  Service:    • LUNG BIOPSY  2012   • VENTRAL/INCISIONAL HERNIA REPAIR N/A 9/15/2016    Procedure: VENTRAL/INCISIONAL  HERNIA REPAIR;  Surgeon: Henry Bullock MD;  Location: [facility];  Service:         Past Social History:  Social History     Socioeconomic History   • Marital status: Unknown     Spouse name: Not on file   • Number of children: Not on file   • Years of education: Not on file   • Highest education level: Not on file   Tobacco Use   • Smoking status: Former Smoker     Packs/day: 1.00     Years: 30.00     Pack years: 30.00     Types: Cigarettes     Last attempt to quit: 2006     Years since quittin.5   • Smokeless tobacco: Never Used   Substance and Sexual Activity   • Alcohol use: No   • Drug use: No   • Sexual activity: Defer       Past Family History:  Family History   Problem Relation Age of Onset   • Alzheimer's disease Mother    • Heart disease Father        Review of Systems:   Please see HPI  Constitution: No chills, no rigors, no unexplained weight loss or weight gain  Eyes:  No diplopia, no blurred vision, no loss of vision, conjunctiva is pink and sclera is anicteric  ENT:  No tinnitus, no otorrhea, no epistaxis, no sore throat   Respiratory: No cough, no hemoptysis  Cardiovascular: see HPI  Gastrointestinal: No nausea, no vomiting, no hematemesis, no diarrhea or constipation, no melena  Genitourinary: No frequency of dysuria no hematuria  Integument: No pruritis and  no skin rash  Hematologic / Lymphatic: No excessive bleeding, easy bruising, fatigue, lymphadenopathy and petechiae  Musculoskeletal: No joint pain, joint stiffness, joint swelling, muscle pain, muscle weakness and neck pain  Neurological: No dizziness, headaches, light headedness, seizures and vertigo  Endocrine: No frequent urination and nocturia, temperature intolerance, weight gain, unintended and weight loss, unintended      Current Outpatient Medications   Medication Sig Dispense Refill   • aclidinium bromide (TUDORZA PRESSAIR) 400 MCG/ACT aerosol powder  powder for inhalation Inhale 1 puff Daily. 1 each 5   • aspirin 81 MG EC  tablet Take 1 tablet by mouth Daily. 30 tablet 5   • atorvastatin (LIPITOR) 20 MG tablet Take 1 tablet by mouth Daily. 30 tablet 5   • azithromycin (ZITHROMAX) 250 MG tablet Take 2 tablets the first day, then 1 tablet daily for 4 days. 6 tablet 0   • clindamycin (CLEOCIN) 300 MG capsule      • clotrimazole (LOTRIMIN) 1 % cream Apply  topically 2 (Two) Times a Day. Over the toenails 45 g 0   • Fluticasone Furoate-Vilanterol 100-25 MCG/INH aerosol powder  Inhale 1 puff Daily. 31 each 5   • ibuprofen (ADVIL,MOTRIN) 200 MG tablet Take 200 mg by mouth Every 6 (Six) Hours As Needed for mild pain (1-3).     • levothyroxine (SYNTHROID, LEVOTHROID) 125 MCG tablet Take 1 tablet by mouth Daily. 30 tablet 2   • lisinopril (PRINIVIL,ZESTRIL) 10 MG tablet Take 1 tablet by mouth Daily. 30 tablet 5   • Multiple Vitamins-Minerals (PX COMPLETE SENIOR MULTIVITS PO) Take  by mouth.       No current facility-administered medications for this visit.         Allergies   Allergen Reactions   • Beta Adrenergic Blockers Other (See Comments)     Bottoms BP out       Objective:  Vitals:    03/19/19 1550   BP: 109/64   Pulse: 60   SpO2: 91%     Comfortable NAD  PERRL, conjunctiva clear  Neck supple, no JVD or thyromegaly appreciated  S1/S2 RRR, no m/r/g  Lungs CTA B, normal effort  Abdomen S/NT/ND (+) BS, no HSM appreciated  Extremities warm, no clubbing, cyanosis, or edema  Normal gait  No visible or palpable skin lesions  A/Ox4, mood and affect appropriate  Pulse exam:    Feet are warm bilateral  1+ edema bilateral  Capillary refill is normal  No evidence of ulceration or color change of the toes  PULSES  Right DP and PT are 1+ and Left DP and PT are 2+    DATA:             Procedures       A/P:    Plan for left focus arteriogram for April 1, 2019. Labs completed in February are sufficient.  Add Plavix 75 mg, once daily.         Patient's Body mass index is 16.09 kg/m². BMI is below normal parameters. Recommendations include: treating the  underlying disease process.         ICD-10-CM ICD-9-CM   1. PAD (peripheral artery disease) (CMS/[facility]) I73.9 443.9   2. Mixed hyperlipidemia E78.2 272.2   3. Coronary artery disease involving native heart with angina pectoris, unspecified vessel or lesion type (CMS/[facility]) I25.119 414.01     413.9        Thank you for allowing me to participate in the care of Onel Erickson. Feel free to contact me directly with any further questions or concerns.

## 2019-03-19 NOTE — TELEPHONE ENCOUNTER
Patients wife called and stated that Onel needed to be seen. We had an opening today but they could not make it. She wanted him to be put on for tomorrow but Dr Fuentes does not have clinic tomorrow. I let Hope speak with her next to try and figure out a day.

## 2019-03-19 NOTE — TELEPHONE ENCOUNTER
I spoke with patient's wife and explained to her that we could have him come in today at 3:30 to see Dr. Fuentes and reevaluate his case. I had also stated that the patient did have a procedure in January that he no showed. She explained that neither of them drive and they would try to get here today and if they couldn't make it she would call back.

## 2019-03-22 NOTE — TELEPHONE ENCOUNTER
----- Message from Deloris Lewis MD sent at 3/22/2019  7:31 AM EDT -----  Please call and let Onel and Reny know that labs are stable.  No white blood cell count. He needs to drink more water, but his kidney function is better than it was several months ago.        Reny notified & verbalized understanding.

## 2019-03-25 PROBLEM — M86.272 SUBACUTE OSTEOMYELITIS OF LEFT FOOT (HCC): Status: ACTIVE | Noted: 2019-01-01

## 2019-04-02 PROBLEM — M86.272 SUBACUTE OSTEOMYELITIS OF LEFT FOOT (HCC): Status: RESOLVED | Noted: 2019-01-01 | Resolved: 2019-01-01

## 2019-04-02 NOTE — OP NOTE
Onel Erickson  4/2/2019      Operative Progress Note:    Surgeon and Assistant: Dr. Dinorah Davis DPM    Pre-Operative Diagnosis: Osteomyelitis left second toe    Post-Operative Diagnosis: Osteomyelitis left second toe    Procedure(s): Amputation left second digit at the metatarsal phalangeal joint    Type of Anesthesia Administered: MAC with local block consisting of 10 cc 50-50 mix 0.5% Marcaine and 1% lidocaine    Estimated Blood Loss: 10mL    Hemostasis: Anatomic    Blood Products: None    Specimen Obtained/Removed: Left second toe sent to pathology    Complication(s):  None    Graft/Implant/Prosthetics/Implanted Device/Transplants: None    Indication: 78-year-old male with history of left partial first ray amputation and peripheral vascular disease.  He has a nonhealing ulcer to the dorsal aspect of the proximal interphalangeal joint of the left second toe with exposed bone and joint.  He does have a scheduled arteriogram within the next few weeks for the left lower extremity.  He elects to proceed with amputation of digit today with the understanding that a nonhealing wound may result in the need for a more proximal or transmetatarsal amputation.    Findings: 1.2 cm diameter ulceration to the dorsal aspect of the left second proximal interphalangeal joint with exposed joint and bone digit has erythema and edema, no malodor no purulence.    Operative Report:  She was identified in the preoperative holding area formal consent was signed and the left second toe was marked as the correct site.  Patient was brought to the operating suite and placed in the operating table in the supine position.  Timeout was performed the patient underwent anesthesia as dictated above.  A well-padded left ankle tourniquet was applied, but was not inflated during the procedure.  The left foot was then scrubbed prepped and draped in the usual sterile manner.  Attention was then focused to the left second digit where jane goldstein  incision was made surrounding the base of the toe.  Dissection was carried out to the metatarsal phalangeal joint which was disarticulated, the toe was removed from the surgical field to be sent to pathology.  The the amputation site surrounding the metatarsal head had no signs of necrosis or deep infection.  There was no tracking and the metatarsal head was intact with a firm cortex.  The surgical site was copiously irrigated, vancomycin powder was packed within the site and the skin was closed with 3-0 nylon.  A dry sterile dressing was applied.  The patient was transferred to PACU with vital signs stable and vascular status intact to the left foot.  The patient will be discharged home today.       Electronically Signed by: Dinorah Davis DPM        Dictated Utilizing Dragon Dictation

## 2019-04-02 NOTE — ANESTHESIA PREPROCEDURE EVALUATION
Anesthesia Evaluation     Patient summary reviewed and Nursing notes reviewed   no history of anesthetic complications:  NPO Solid Status: > 8 hours  NPO Liquid Status: > 8 hours           Airway   Mallampati: I  TM distance: >3 FB  Neck ROM: full  no difficulty expected  Dental - normal exam   (+) edentulous    Pulmonary - normal exam   (+) a smoker Former, COPD, decreased breath sounds,   (-) asthma  Cardiovascular - normal exam  Exercise tolerance: good (4-7 METS)    NYHA Classification: II  PT is on anticoagulation therapy    (+) hypertension, CAD, CABG (x4 in 2012), PVD, hyperlipidemia,   (-) past MI, dysrhythmias, angina, CHF      Neuro/Psych  (+) TIA (2011),     (-) seizures, CVA  GI/Hepatic/Renal/Endo    (+)  hiatal hernia,  liver disease, hypothyroidism,     Musculoskeletal     (+) back pain,   Abdominal  - normal exam    Bowel sounds: normal.   Substance History - negative use     OB/GYN negative ob/gyn ROS         Other   (+) arthritis                       Anesthesia Plan    ASA 3     MAC     intravenous induction   Anesthetic plan, all risks, benefits, and alternatives have been provided, discussed and informed consent has been obtained with: patient and spouse/significant other.  Use of blood products discussed with patient and spouse/significant other  Consented to blood products.

## 2019-04-02 NOTE — PERIOPERATIVE NURSING NOTE
Dr Rose saw pt for preop AM of surgery.  States OK to proceed with surgery.  Cardiac clearance not necessary.   Per Lynda Berrios, ok to proceed from  standpoint with last dose of Plavix at 4:30 this AM.

## 2019-04-02 NOTE — H&P
PODIATRIC SURGERY  History and Physical      Principal problem: Subacute osteomyelitis of left foot (CMS/HCC)    Subjective .     History of present illness:    Mr. Onel Erickson is a 78 y.o. years old male with past medical history significant for CAD, PVD, COPD. History of left foot partial 1st ray amputation. Non-healing ulceration with exposed bone left 2nd toe. He is scheduled for arteriogram within the next few weeks for PVD. He presents for amputation of the digit. No changes to medical history since last seen as an outpatient.     History taken from: patient    Case was discussed with patient    Review of Systems    Constitutional: no fever, chills and night sweats. No appetite change or unexpected weight change. No fatigue.  Eyes: no eye drainage, itching or redness.  HEENT: no mouth sores, dysphagia or nose bleed.  Respiratory: no for shortness of breath, cough or production of sputum.  Cardiovascular: no chest pain, no palpitations, no orthopnea.  Gastrointestinal: no nausea, vomiting or diarrhea. No abdominal pain, hematemesis or rectal bleeding.  Genitourinary: no dysuria or polyuria.  Hematologic/lymphatic: no lymph node abnormalities, no easy bruising or easy bleeding.  Musculoskeletal: no muscle or joint pain.  Skin: No rash and no itching.  Neurological: no loss of consciousness, no seizure, no headache.  Behavioral/Psych: no depression or suicidal ideation.  Endocrine: no hot flashes.  Immunologic: negative.    Past Medical History    Past Medical History:   Diagnosis Date   • Arthritis    • CAD (coronary artery disease)     Sees Dr. Harley   • Cataracts, bilateral    • COPD (chronic obstructive pulmonary disease) (CMS/HCC)    • Elevated cholesterol    • Former smoker     Stopped in 2012   • Hernia of abdominal wall    • Hiatal hernia    • History of TIAs    • Hyperlipidemia    • Hypertension    • Hypothyroidism    • Inguinal hernia    • Kidney disease    • PAD (peripheral artery disease)  "(CMS/[facility])    • Ruptured lumbar disc    • Scrotal hernia     Left   • Stroke (CMS/[facility]) 2011    10 YEARS AGO   • Ventral hernia        Past Surgical History    Past Surgical History:   Procedure Laterality Date   • AMPUTATION  2012    left great toe amputation   • ARTERIAL BYPASS SURGERY  10/2012    left leg - groin to ankle; Dr. Browne; [facility].   • BACK SURGERY      age 30; spinal fusion   • CARDIAC SURGERY  2012    Open heart bypass; [facility]   • CAROTID STENT Bilateral     Cage in right carotid   • CATARACT EXTRACTION  2016   • CORONARY ARTERY BYPASS GRAFT  2012    x5   • HERNIA REPAIR      age 35; inguinal hernia   • INGUINAL HERNIA REPAIR Left 9/15/2016    Procedure: INGUINAL HERNIA REPAIR;  Surgeon: Henry Bullock MD;  Location:  [facility] OR;  Service:    • INGUINAL HERNIA REPAIR Left 2016    Procedure: INGUINAL HERNIA REPAIR;  Surgeon: Henry Bullock MD;  Location: [facility] OR;  Service:    • LUNG BIOPSY     • VASCULAR SURGERY     • VENTRAL/INCISIONAL HERNIA REPAIR N/A 9/15/2016    Procedure: VENTRAL/INCISIONAL HERNIA REPAIR;  Surgeon: Henry Bullock MD;  Location:  [facility] OR;  Service:        Family History    Family History   Problem Relation Age of Onset   • Alzheimer's disease Mother    • Heart disease Father        Social History    Social History     Tobacco Use   • Smoking status: Former Smoker     Packs/day: 1.00     Years: 30.00     Pack years: 30.00     Types: Cigarettes     Last attempt to quit: 2012     Years since quittin.5   • Smokeless tobacco: Never Used   Substance Use Topics   • Alcohol use: No   • Drug use: No       Allergies    Beta adrenergic blockers    Objective     /63 (BP Location: Right arm, Patient Position: Lying)   Pulse 58   Temp 97.6 °F (36.4 °C) (Oral)   Resp 20   Ht 170.2 cm (67\")   Wt 46.3 kg (102 lb)   SpO2 97%   BMI 15.98 kg/m²     Temp:  [97.6 °F (36.4 °C)] 97.6 °F (36.4 °C)      No intake or output data in the " 24 hours ending 04/02/19 0913      Physical Exam:     General Appearance:    Alert, cooperative, in no acute distress   Head:    Normocephalic, without obvious abnormality, atraumatic    Heart:    Regular rhythm and normal rate     Chest Wall:    No abnormalities observed   Abdomen:    Soft non-tender, non-distended, no guarding, no rebound                tenderness   Extremities:   Moves all extremities well. Ulceration with exposed bone dorsal left 2nd PIPJ. Edema and necrosis noted to the digit.   Pulses:   0/4 DP and PT left foot   Skin:   No bleeding, bruising or rash   Lymph nodes:   No palpable adenopathy                   Lab Results   Component Value Date    NEUTROABS 4.59 03/21/2019                   Imaging Results (last 24 hours)     ** No results found for the last 24 hours. **            Results Review:    I have personally reviewed laboratory data, culture results, radiology studies and antimicrobial therapy.    Hospital Medications (active)       Dose Frequency Start End    ceFAZolin Sodium-Dextrose (ANCEF) IVPB (duplex) 2 g 2 g Once 4/2/2019     Sig - Route: Infuse 2,000 mg into a venous catheter 1 (One) Time. - Intravenous    lactated ringers infusion 125 mL/hr Continuous 4/2/2019     Sig - Route: Infuse 125 mL/hr into a venous catheter Continuous. - Intravenous    sodium chloride 0.9 % flush 3 mL 3 mL Every 12 Hours Scheduled 4/2/2019     Sig - Route: Infuse 3 mL into a venous catheter Every 12 (Twelve) Hours. - Intravenous    sodium chloride 0.9 % flush 3-10 mL 3-10 mL As Needed 4/2/2019     Sig - Route: Infuse 3-10 mL into a venous catheter As Needed for Line Care. - Intravenous            PROBLEM LIST:    Patient Active Problem List   Diagnosis   • Hypothyroidism   • Hyperlipidemia   • COPD (chronic obstructive pulmonary disease) (CMS/[facility])   • PAD (peripheral artery disease) (CMS/[facility])   • CAD (coronary artery disease)   • History of TIAs   • Elevated liver enzymes   • Renal insufficiency   •  History of amputation of toe (CMS/HCC)   • Thrombocytopenia (CMS/HCC)   • Subacute osteomyelitis of left foot (CMS/HCC)       Assessment/Plan     ASSESSMENT:    Left 2nd toe osteomyelitis and gangrene    PLAN:    -Proceeding with amputation of left 2nd toe after thorough discussion of all alternatives, risks, and benefits.      Patients findings and recommendations were discussed with patient    Code Status:   There are no questions and answers to display.       Dinorah Davis DPM  04/02/19  9:13 AM

## 2019-04-02 NOTE — ANESTHESIA POSTPROCEDURE EVALUATION
Patient: Onel Erickson    Procedure Summary     Date:  04/02/19 Room / Location:   [facility] OR 01 /  [facility]    Anesthesia Start:  0926 Anesthesia Stop:  1013    Procedure:  AMPUTATION DIGIT LEFT 2ND (Left Toe Second) Diagnosis:       Subacute osteomyelitis of left foot (CMS/HCC)      (Subacute osteomyelitis of left foot (CMS/HCC) [M86.272])    Surgeon:  Dinorah Davis DPM Provider:  Adi Rose MD    Anesthesia Type:  MAC ASA Status:  3          Anesthesia Type: MAC  Last vitals  BP   108/58 (04/02/19 1014)   Temp   98.4 °F (36.9 °C) (04/02/19 1014)   Pulse   69 (04/02/19 1019)   Resp   18 (04/02/19 1019)     SpO2   100 % (04/02/19 1019)     Post Anesthesia Care and Evaluation    Patient location during evaluation: PHASE II  Patient participation: complete - patient participated  Level of consciousness: awake and alert  Pain score: 1  Pain management: adequate  Airway patency: patent  Anesthetic complications: No anesthetic complications  PONV Status: controlled  Cardiovascular status: acceptable  Respiratory status: acceptable  Hydration status: acceptable

## 2019-04-09 NOTE — PROGRESS NOTES
"Onel Erickson     VITALS: Pulse 69, height 170.2 cm (67.01\"), weight 46.3 kg (102 lb), SpO2 94 %.    Subjective  Chief Complaint:   Chief Complaint   Patient presents with   • Wound Check        History of Present Illness:  Patient is a 78 y.o. male with a medical history significant for CAD and COPD who presents to clinic secondary to medical followup.   No new or acute concerns today. Patient is here today with his wife Reny.  They would like to discuss an impending surgery for toe amputation.  Apparently his left second toe has had osteomyelitis set in.  His podiatrist, Dinorah Davis, would like to amputate it.  However, he does have an extensive history of PAD and requires new workup.  Patient and wife are concerned about delaying one for the other.    He continues to cough on and off. This has been happening for approximately a year. He has been treated with antibiotics without any success. CXR is okay. He has had a history of lung nodules. We have tried to continue workup on them. Wife has refused - states that he has has workup on lung nodules \"years ago\" at  and they were considered benign fungal nodules. Have attempted to get old records without success.   He did have a CT scan done since his last appointment.  Results are benign but it did show a widened aorta and chronic pancreatitis.  He does complain of a little diarrhea today.  He states that his stools are looser but not watery.  He says that he may have 1 or 2 episodes a day.  This has been going on for a while.    Patient also has a history of hyperlipidemia and is currently on atorvastatin 20 mg orally daily. Denies any side effects of the medications. Last lipid panel in 7/2018 and was ok. Denies any muscle weakness, jaundice or itching.   Low cholesterol diet: Yes    Patient does have a history of hypothyroidism and is currently on synthroid 175 mcg orally daily. He is doing well on this medication. Denies any side effects. Denies fatigue, " "dizziness, palpitations, changes in weight, hair, or nails. Last thyroid panel was in 11/2018 and was abnormal.     Patient has a history of COPD and is currently on Advair 250/50 1 puff twice daily and Spiriva daily. Denies any side effects of the medications. Occasional shortness of breath, but no other coughing, wheezing, or night coughing. Reports that in the past CXR has shown \"fungus spots on his lungs\".  Exacerbation: No  Last utilized albuterol: Does not have albuterol    Patient has a history of CAD. He sees Dr. Harley, and is currently on Aspirin 81 mg daily. He does have an extensive history, including a cath, 5 bypasses, along with a history of PAD with stents in both legs along with a left arterial bypass in his lower extremity. In the past, he was on plavix, digoxin, imdur, and lasix, but Dr. Harley has since taken him off of these medications as he has been extremely stable in the past. Last saw Dr. Harley some months ago. He quit smoking in 2012.  Most recently, his podiatrist sent him to Dr. Fuentes for claudication.  He has a history of severe peripheral vascular disease with absence of pulses on both lower extremities with a history of peripheral bypass in 2012 on the left side And a history of stenting to the right femoral on the right side.  Status post amputation of the left hallux 2012.  Dr. Fuentes had scheduled him for an arteriogram runoff in January 2019, but Onel had no showed.  History of stroke: Yes    No complaints about any of the medications.    The following portions of the patient's history were reviewed and updated as appropriate: allergies, current medications, past family history, past medical history, past social history, past surgical history and problem list.    Past Medical History  Past Medical History:   Diagnosis Date   • Arthritis    • CAD (coronary artery disease)     Sees Dr. Harley   • Cataracts, bilateral    • COPD (chronic obstructive pulmonary " disease) (CMS/[facility])    • Elevated cholesterol    • Former smoker     Stopped in 2012   • Hernia of abdominal wall    • Hiatal hernia    • History of TIAs    • Hyperlipidemia    • Hypertension    • Hypothyroidism    • Inguinal hernia    • Kidney disease    • PAD (peripheral artery disease) (CMS/[facility])    • Ruptured lumbar disc    • Scrotal hernia     Left   • Stroke (CMS/[facility]) 2011    10 YEARS AGO   • Ventral hernia        Review of Systems  Constitutional: Denies any recent history of HAs, dizziness, fevers, chills, itching.  Eyes: Denies any changes in vision. Denies any blurry vision or diplopia.  Ears, Nose, Mouth, Throat: Denies any sore throat, rhinorrhea, or cough.  Cardiovascular: Denies any chest pain, pressure, or palpitations.  Respiratory: Denies any shortness of breath or wheezing.  Gastrointestinal: Denies any abdominal pain, nausea, vomiting, diarrhea, or constipation.  Genitourinary: Denies any changes in urination.  Musculoskeletal: Denies any muscle weakness.  Skin and/or breasts: Denies any rashes.  Neurological: Denies any changes in balance or gait.  Psychiatric: Denies any anxiety, depression, or insomnia. Denies any suicidal or homicidal ideations.  Endocrine: Denies any heat or cold intolerance. Denies any voice changes, polydipsia, or polyuria.  Hematologic/Lymphatic: Denies any anemia or easy bruising.    Surgical History  Past Surgical History:   Procedure Laterality Date   • AMPUTATION  06/2012    left great toe amputation   • AMPUTATION DIGIT Left 4/2/2019    Procedure: AMPUTATION DIGIT LEFT 2ND;  Surgeon: Dinorah Davis DPM;  Location: [facility];  Service: Podiatry   • ARTERIAL BYPASS SURGERY  10/2012    left leg - groin to ankle; Dr. Browne; [facility].   • BACK SURGERY      age 30; spinal fusion   • CARDIAC SURGERY  08/2012    Open heart bypass; [facility]   • CAROTID STENT Bilateral     Cage in right carotid   • CATARACT EXTRACTION  03/07/2016   • CORONARY ARTERY  BYPASS GRAFT  2012    x5   • HERNIA REPAIR      age 35; inguinal hernia   • INGUINAL HERNIA REPAIR Left 9/15/2016    Procedure: INGUINAL HERNIA REPAIR;  Surgeon: Henry Bullock MD;  [facility];  Service:    • INGUINAL HERNIA REPAIR Left 2016    Procedure: INGUINAL HERNIA REPAIR;  Surgeon: Henry Bullock MD;  [facility]:  [facility];  Service:    • LUNG BIOPSY     • VASCULAR SURGERY     • VENTRAL/INCISIONAL HERNIA REPAIR N/A 9/15/2016    Procedure: VENTRAL/INCISIONAL HERNIA REPAIR;  Surgeon: Henry Bullock MD;  Location:  [facility];  Service:        Family History  Family History   Problem Relation Age of Onset   • Alzheimer's disease Mother    • Heart disease Father        Social History  Social History     Socioeconomic History   • Marital status:      Spouse name: Not on file   • Number of children: Not on file   • Years of education: Not on file   • Highest education level: Not on file   Tobacco Use   • Smoking status: Former Smoker     Packs/day: 1.00     Years: 30.00     Pack years: 30.00     Types: Cigarettes     Last attempt to quit: 2012     Years since quittin.5   • Smokeless tobacco: Never Used   Substance and Sexual Activity   • Alcohol use: No   • Drug use: No   • Sexual activity: Defer       Objective  Physical Exam    Gen: Patient in NAD. Pleasant and answers appropriately. A&Ox3.    Skin: Warm and dry with normal turgor. No purpura, rashes, or unusual pigmentation noted. Hair is normal in appearance and distribution.    HEENT: NC/AT. No lesions noted. Conjunctiva clear, sclera nonicteric. PERRL. EOMI without nystagmus or strabismus. Fundi appear benign. No hemorrhages or exudates of eyes. Auditory canals are patent bilaterally without lesions. TMs intact,  nonerythematous, nonbulging without lesions. Nasal mucosa pink, nonerythematous, and nonedematous. Frontal and maxillary sinuses are nontender. O/P nonerythematous and moist without exudate.    Neck: Supple without  lymph nodes palpated. FROM.     Lungs: CTA B/L without rales, rhonchi, crackles, or wheezes.    Heart: RRR. S1 and S2 normal. No S3 or S4. No MRGT.    Abd: Soft, nontender,nondistended. (+)BSx4 quadrants.     Extrem: No CCE. Radial pulses 2+/4 and equal B/L. FROMx4. No bone, joint, or muscle tenderness noted.    Neuro: No focal motor/sensory deficits.    Procedures    Assessment/Plan  Onel Erickson is a 78 y.o. here for medical followup.  Diagnoses and all orders for this visit:    Renal insufficiency  -     CBC & Differential  -     Comprehensive Metabolic Panel  -     CBC Auto Differential    Elevated liver enzymes  CMP today for monitoring.    Other chronic osteomyelitis of left foot (CMS/HCC)  Per podiatry.    PAD (peripheral artery disease) (CMS/HCC)  Per cardiology.    Patient's Body mass index is 15.97 kg/m². BMI is below normal parameters. Recommendations include: treating the underlying disease process.      Findings and plans discussed with patient who verbalizes understanding and agreement. Will followup with patient once results are in. Patient to followup at clinic PRN or in one month for further medical followup.    Deloris Lewis MD    EMR Dragon/Transcription Disclaimer:  Much of this encounter note is an electronic transcription/translation of spoken language to printed text.  The electronic translation of spoken language may permit erroneous, or at times, nonsensical words or phrases to be inadvertently transcribed.  Although I have reviewed the note for such errors, some may still exist.

## 2019-04-11 PROBLEM — I73.9 PVD (PERIPHERAL VASCULAR DISEASE) WITH CLAUDICATION (HCC): Status: ACTIVE | Noted: 2019-01-01

## 2019-04-15 NOTE — NURSING NOTE
Pt/family given discharge instructions/teaching. Verbalizes understanding. Pt discharged from [facility]. Taken to [facility] per cath lab staff.

## 2019-05-06 NOTE — PROGRESS NOTES
"Onel Erickson     VITALS: Blood pressure 110/71, pulse 53, height 175.3 cm (69.02\"), weight 47.2 kg (104 lb), SpO2 97 %.    Subjective  Chief Complaint:   Chief Complaint   Patient presents with   • Hypothyroidism   • Toe Pain     amputation         History of Present Illness:  Patient is a 78 y.o. male with a medical history significant for CAD and COPD who presents to clinic secondary to medical followup.   No new or acute concerns today. Patient is here today with his wife Reny.  Toe amputation has been done secondary to osteomyelitis.  He is recovering well.  He has follow-up with his podiatrist today this afternoon.  He continues to do a PAD workup.      He continues to cough on and off. This has been happening for approximately a year. He has been treated with antibiotics without any success. CXR is okay along with CT chest. He has had a history of lung nodules. We have tried to continue workup on them. Wife has refused - states that he has has workup on lung nodules \"years ago\" at  and they were considered benign fungal nodules. Have attempted to get old records without success.   He did have a CT scan done since his last appointment.  Results are benign but it did show a widened aorta and chronic pancreatitis.  He does complain of a little diarrhea today.  He states that his stools are looser but not watery.  He says that he may have 1 or 2 episodes a day.  This has been going on for a while.    Patient also has a history of hyperlipidemia and is currently on atorvastatin 20 mg orally daily. Denies any side effects of the medications. Last lipid panel in 7/2018 and was ok. Denies any muscle weakness, jaundice or itching.   Low cholesterol diet: Yes    Patient does have a history of hypothyroidism and is currently on synthroid 175 mcg orally daily. He is doing well on this medication. Denies any side effects. Denies fatigue, dizziness, palpitations, changes in weight, hair, or nails. Last thyroid panel " "was in 2/2019 and was normal.     Patient has a history of COPD and is currently on Advair 250/50 1 puff twice daily and Spiriva daily.  He also has samples of Breo, Spiriva, and trilogy at home.  Denies any side effects of the medications. Occasional shortness of breath, but no other coughing, wheezing, or night coughing. Reports that in the past CXR has shown \"fungus spots on his lungs\".  Exacerbation: No  Last utilized albuterol: Does not have albuterol    Patient has a history of CAD. He sees Dr. Harley, and is currently on Aspirin 81 mg daily. He does have an extensive history, including a cath, 5 bypasses, along with a history of PAD with stents in both legs along with a left arterial bypass in his lower extremity. In the past, he was on plavix, digoxin, imdur, and lasix, but Dr. Harley has since taken him off of these medications as he has been extremely stable in the past. Last saw Dr. Harley some months ago. He quit smoking in 2012.  Most recently, his podiatrist sent him to Dr. Fuentes for claudication.  He has a history of severe peripheral vascular disease with absence of pulses on both lower extremities with a history of peripheral bypass in 2012 on the left side And a history of stenting to the right femoral on the right side.  Status post amputation of the left hallux 2012.  Dr. Fuentes had scheduled him for an arteriogram runoff in January 2019, but Onel had no showed.  History of stroke: Yes    No complaints about any of the medications.    The following portions of the patient's history were reviewed and updated as appropriate: allergies, current medications, past family history, past medical history, past social history, past surgical history and problem list.    Past Medical History  Past Medical History:   Diagnosis Date   • Arthritis    • CAD (coronary artery disease)     Sees Dr. Harley   • Cataracts, bilateral    • COPD (chronic obstructive pulmonary disease) (CMS/[facility])    • " Elevated cholesterol    • Former smoker     Stopped in 2012   • Hernia of abdominal wall    • Hiatal hernia    • History of TIAs    • Hyperlipidemia    • Hypertension    • Hypothyroidism    • Inguinal hernia    • Kidney disease    • PAD (peripheral artery disease) (CMS/HCC)    • Ruptured lumbar disc    • Scrotal hernia     Left   • Stroke (CMS/HCC) 2011    10 YEARS AGO   • Ventral hernia        Review of Systems  Constitutional: Denies any recent history of HAs, dizziness, fevers, chills, itching.  Eyes: Denies any changes in vision. Denies any blurry vision or diplopia.  Ears, Nose, Mouth, Throat: Denies any sore throat, rhinorrhea, or cough.  Cardiovascular: Denies any chest pain, pressure, or palpitations.  Respiratory: Denies any shortness of breath or wheezing.  Gastrointestinal: Denies any abdominal pain, nausea, vomiting, diarrhea, or constipation.  Genitourinary: Denies any changes in urination.  Musculoskeletal: Denies any muscle weakness.  Skin and/or breasts: Denies any rashes.  Neurological: Denies any changes in balance or gait.  Psychiatric: Denies any anxiety, depression, or insomnia. Denies any suicidal or homicidal ideations.  Endocrine: Denies any heat or cold intolerance. Denies any voice changes, polydipsia, or polyuria.  Hematologic/Lymphatic: Denies any anemia or easy bruising.    Surgical History  Past Surgical History:   Procedure Laterality Date   • AMPUTATION  06/2012    left great toe amputation   • AMPUTATION DIGIT Left 4/2/2019    Procedure: AMPUTATION DIGIT LEFT 2ND;  Surgeon: Dinorah Davis DPM;  Location: [facility] OR;  Service: Podiatry   • ARTERIAL BYPASS SURGERY  10/2012    left leg - groin to ankle; Dr. Browne; [facility]   • ARTERIOGRAM N/A 4/15/2019    Procedure: Arteriogram;  Surgeon: Valentin Fuentes MD;  Location: [facility] INVAS[facility] LOC[facility];  Service: Cardiology   • BACK SURGERY      age 30; spinal fusion   • CARDIAC SURGERY  08/2012    Open heart bypass; [facility]   • CAROTID STENT Bilateral     Cage in right carotid   • CATARACT EXTRACTION  2016   • CORONARY ARTERY BYPASS GRAFT  2012    x5   • HERNIA REPAIR      age 35; inguinal hernia   • INGUINAL HERNIA REPAIR Left 9/15/2016    Procedure: INGUINAL HERNIA REPAIR;  Surgeon: Henry Bullock MD;  [facility];  Service:    • INGUINAL HERNIA REPAIR Left 2016    Procedure: INGUINAL HERNIA REPAIR;  Surgeon: Henry Bullock MD;  [facility];  Service:    • LUNG BIOPSY     • VASCULAR SURGERY     • VENTRAL/INCISIONAL HERNIA REPAIR N/A 9/15/2016    Procedure: VENTRAL/INCISIONAL HERNIA REPAIR;  Surgeon: Henry Bullock MD;  [facility];  Service:        Family History  Family History   Problem Relation Age of Onset   • Alzheimer's disease Mother    • Heart disease Father        Social History  Social History     Socioeconomic History   • Marital status:      Spouse name: Not on file   • Number of children: Not on file   • Years of education: Not on file   • Highest education level: Not on file   Tobacco Use   • Smoking status: Former Smoker     Packs/day: 1.00     Years: 30.00     Pack years: 30.00     Types: Cigarettes     Last attempt to quit: 2012     Years since quittin.6   • Smokeless tobacco: Never Used   Substance and Sexual Activity   • Alcohol use: No   • Drug use: No   • Sexual activity: Defer       Objective  Physical Exam    Gen: Patient in NAD. Pleasant and answers appropriately. A&Ox3.    Skin: Warm and dry with normal turgor. No purpura, rashes, or unusual pigmentation noted. Hair is normal in appearance and distribution.    HEENT: NC/AT. No lesions noted. Conjunctiva clear, sclera nonicteric. PERRL. EOMI without nystagmus or strabismus. Fundi appear benign. No hemorrhages or exudates of eyes. Auditory canals are patent bilaterally without lesions. TMs intact,  nonerythematous, nonbulging without lesions. Nasal mucosa pink, nonerythematous, and  nonedematous. Frontal and maxillary sinuses are nontender. O/P nonerythematous and moist without exudate.    Neck: Supple without lymph nodes palpated. FROM.     Lungs: CTA B/L without rales, rhonchi, crackles, or wheezes.    Heart: RRR. S1 and S2 normal. No S3 or S4. No MRGT.    Abd: Soft, nontender,nondistended. (+)BSx4 quadrants.     Extrem: No CCE. Radial pulses 2+/4 and equal B/L. FROMx4. No bone, joint, or muscle tenderness noted.    Neuro: No focal motor/sensory deficits.    Procedures    Assessment/Plan  Onel Erickson is a 78 y.o. here for medical followup.  Diagnoses and all orders for this visit:    Other chronic osteomyelitis of left foot (CMS/HCC)  Stable.  Per podiatry.  Recent amputation.  Continue to monitor.    PAD (peripheral artery disease) (CMS/HCC)  Pending work-up with vascular surgery.    Other emphysema (CMS/HCC)  We will continue to work on.  Currently trying samples of trelogy, Spiriva, and Breo at home.  He is not sure what helps him the most.    Thrombocytopenia (CMS/HCC)  Stable.  Continue to monitor.  Will recheck at next visit.      Other specified hypothyroidism  Stable.  Continue Synthroid 125 mcg orally daily.  Recheck in May 2019.    Patient's Body mass index is 15.35 kg/m². BMI is below normal parameters. Recommendations include: treating the underlying disease process.     Findings and plans discussed with patient who verbalizes understanding and agreement. Will followup with patient once results are in. Patient to followup at clinic PRN or in two months for further medical followup.    Deloris Lewis MD    EMR Dragon/Transcription Disclaimer:  Much of this encounter note is an electronic transcription/translation of spoken language to printed text.  The electronic translation of spoken language may permit erroneous, or at times, nonsensical words or phrases to be inadvertently transcribed.  Although I have reviewed the note for such errors, some may still exist.

## 2019-06-26 NOTE — TELEPHONE ENCOUNTER
----- Message from Deloris Lewis MD sent at 6/26/2019  3:28 PM EDT -----  Please call Onel. Thyroid is off. Is he taking his synthroid 125 mcg? If he is, can we increase his synthroid to 137 mcg orally daily? If he's okay with it, please send #[phone] with 2 refills to his pharmacy. Please also let him know that his kidneys are a little stressed out; he needs to increase fluids. I know I put him out for four months, but please make him an appointment for early to mid-August - I need to recheck his kidneys. Thanks.      Onel's phone is not accepting calls at this time.    Spoke with( EC) Yanira,patient was present ,placed wife Reny on the phone & she verbalized understanding,he has been taking the 125 of Synthroid,agreeable to change,sent to pharmacy,she reports it looks like he missed 3 doses last month,she will call back & scheduled for mid august ,declined making an apt. Today.

## 2019-07-14 NOTE — PROGRESS NOTES
QUICK REFERENCE INFORMATION:  The ABCs of the Annual Wellness Visit    Subsequent Medicare Wellness Visit     HEALTH RISK ASSESSMENT    : 1940    Recent Hospitalizations:  Recently treated at the following:  [facility].  [facility]      Current Medical Providers:  Patient Care Team:  Deloris Lewis MD as PCP - General (Family Medicine)        Smoking Status:  Social History     Tobacco Use   Smoking Status Former Smoker   • Packs/day: 1.00   • Years: 30.00   • Pack years: 30.00   • Types: Cigarettes   • Last attempt to quit: 2012   • Years since quittin.8   Smokeless Tobacco Never Used       Alcohol Consumption:  Social History     Substance and Sexual Activity   Alcohol Use No       Depression Screen:   PHQ-2/PHQ-9 Depression Screening 2019   Little interest or pleasure in doing things 0   Feeling down, depressed, or hopeless 0   Total Score 0       Health Habits and Functional and Cognitive Screening:  Functional & Cognitive Status 2019   Do you have difficulty preparing food and eating? No   Do you have difficulty bathing yourself, getting dressed or grooming yourself? No   Do you have difficulty using the toilet? No   Do you have difficulty moving around from place to place? No   Do you have trouble with steps or getting out of a bed or a chair? No   In the past year have you fallen or experienced a near fall? No   Current Diet Well Balanced Diet   Dental Exam Not up to date   Eye Exam Up to date   Exercise (times per week) 2 times per week   Current Exercise Activities Include Walking   Do you need help using the phone?  No   Are you deaf or do you have serious difficulty hearing?  No   Do you need help with transportation? No   Do you need help shopping? No   Do you need help preparing meals?  No   Do you need help with housework?  No   Do you need help with laundry? No   Do you need help taking your medications? No   Do you need help managing money? No   Do you ever drive or  ride in a car without wearing a seat belt? No   Have you felt unusual stress, anger or loneliness in the last month? No   Who do you live with? Spouse   If you need help, do you have trouble finding someone available to you? No   Have you been bothered in the last four weeks by sexual problems? No   Do you have difficulty concentrating, remembering or making decisions? No           Does the patient have evidence of cognitive impairment? Yes    Asiprin use counseling: Taking ASA appropriately as indicated      Recent Lab Results:    Lab Results   Component Value Date    GLU 76 09/09/2016     Lab Results   Component Value Date    HGBA1C 5.80 (H) 08/17/2016     Lab Results   Component Value Date    CHOL 134 07/27/2018    TRIG 65 07/27/2018    HDL 54 (L) 07/27/2018    VLDL 13 07/27/2018    LDLHDL 1.24 07/27/2018           Age-appropriate Screening Schedule:  Refer to the list below for future screening recommendations based on patient's age, sex and/or medical conditions. Orders for these recommended tests are listed in the plan section. The patient has been provided with a written plan.    Health Maintenance   Topic Date Due   • TDAP/TD VACCINES (1 - Tdap) 05/22/1959   • PNEUMOCOCCAL VACCINES (65+ LOW/MEDIUM RISK) (1 of 2 - PCV13) 05/22/2005   • ZOSTER VACCINE (2 of 2) 07/12/2018   • LIPID PANEL  07/27/2019   • INFLUENZA VACCINE  08/01/2019        Subjective   History of Present Illness    Onel Erickson is a 79 y.o. male who presents for an Annual Wellness Visit.    The following portions of the patient's history were reviewed and updated as appropriate: allergies, current medications, past family history, past medical history, past social history, past surgical history and problem list.    Outpatient Medications Prior to Visit   Medication Sig Dispense Refill   • aclidinium bromide (TUDORZA PRESSAIR) 400 MCG/ACT aerosol powder  powder for inhalation Inhale 1 puff Daily. 1 each 5   • aspirin 81 MG EC tablet Take 1  tablet by mouth Daily. 30 tablet 5   • atorvastatin (LIPITOR) 20 MG tablet Take 1 tablet by mouth Daily. 30 tablet 5   • clopidogrel (PLAVIX) 75 MG tablet Take 1 tablet by mouth Daily. 30 tablet 11   • clotrimazole (LOTRIMIN) 1 % cream Apply  topically 2 (Two) Times a Day. Over the toenails 45 g 0   • ibuprofen (ADVIL,MOTRIN) 200 MG tablet Take 200 mg by mouth Every 6 (Six) Hours As Needed for mild pain (1-3).     • lisinopril (PRINIVIL,ZESTRIL) 10 MG tablet Take 1 tablet by mouth Daily. 30 tablet 5   • Multiple Vitamins-Minerals (PX COMPLETE SENIOR MULTIVITS PO) Take  by mouth.     • levothyroxine (SYNTHROID, LEVOTHROID) 125 MCG tablet Take 1 tablet by mouth Daily. 30 tablet 2   • HYDROcodone-acetaminophen (NORCO) 5-325 MG per tablet Take one tablets every 4-6 hours prn pain 20 tablet 0     No facility-administered medications prior to visit.        Patient Active Problem List   Diagnosis   • Hypothyroidism   • Hyperlipidemia   • COPD (chronic obstructive pulmonary disease) (CMS/HCC)   • PAD (peripheral artery disease) (CMS/[facility])   • CAD (coronary artery disease)   • History of TIAs   • Elevated liver enzymes   • Renal insufficiency   • History of amputation of toe (CMS/HCC)   • Thrombocytopenia (CMS/HCC)   • PVD (peripheral vascular disease) with claudication (CMS/[facility])       Advance Care Planning:  Patient does not have an advance directive - information provided to the patient today    Identification of Risk Factors:  Risk factors include: Advance Directive Discussion  Cardiovascular risk  Dementia/Memory   Depression/Dysphoria  Fall Risk  Inadequate Social Support, Isolation, Loneliness, Lack of Transportation, Financial Difficulties, or Caregiver Stress   Inactivity/Sedentary  Polypharmacy.    Review of Systems   Constitutional: Negative for chills, fatigue and fever.   HENT: Negative for congestion, ear pain, facial swelling, rhinorrhea, sinus pressure, sore throat and trouble swallowing.    Eyes: Negative for  photophobia, discharge, itching and visual disturbance.   Respiratory: Negative for cough, shortness of breath and wheezing.    Cardiovascular: Negative for chest pain, palpitations and leg swelling.   Gastrointestinal: Negative for abdominal pain, constipation, diarrhea, nausea and vomiting.   Endocrine: Negative for cold intolerance, heat intolerance, polydipsia and polyuria.   Genitourinary: Negative for difficulty urinating, dysuria and hematuria.   Skin: Negative for rash.   Neurological: Negative for dizziness, light-headedness and headaches.   Psychiatric/Behavioral: Negative for agitation and suicidal ideas. The patient is not nervous/anxious.        Compared to one year ago, the patient feels his physical health is the same.  Compared to one year ago, the patient feels his mental health is the same.    Objective     Physical Exam   Constitutional: He is oriented to person, place, and time. Vital signs are normal. He appears well-developed and well-nourished. No distress.   NAD. A&Ox3.   HENT:   Head: Normocephalic and atraumatic.   Right Ear: External ear normal.   Left Ear: External ear normal.   Mouth/Throat: Oropharynx is clear and moist. No oropharyngeal exudate.   Auditory canals bilaterally nonerythematous. TMs bilaterally nonerythematous, nonbulging. Nasal mucosa nonerythematous, nonedematous. No nasal polyps. O/P nonerythematous, nonedematous, without exudate.   Eyes: Conjunctivae and EOM are normal. Pupils are equal, round, and reactive to light. No scleral icterus.   Neck: Full passive range of motion without pain. Neck supple. Carotid bruit is not present. No thyromegaly present.   Cardiovascular: Normal rate, regular rhythm, S1 normal, S2 normal, normal heart sounds and intact distal pulses. Exam reveals no gallop, no S3, no S4 and no friction rub.   No murmur heard.  No thrills. Pulses 2+/4 B/L in UE and LE.    Pulmonary/Chest: Effort normal and breath sounds normal. No respiratory distress. He  "has no wheezes. He has no rhonchi. He has no rales. He exhibits no tenderness.   CTA B/L w/o RRW.   Abdominal: Soft. Bowel sounds are normal. He exhibits no distension, no abdominal bruit and no mass. There is no hepatosplenomegaly. There is no tenderness. No hernia.   Nondistended, nontender. (+)BSx4.    Musculoskeletal: Normal range of motion. He exhibits no edema or deformity.   FROMx4, equal B/L.    Lymphadenopathy:     He has no cervical adenopathy.   Neurological: He is alert and oriented to person, place, and time. He has normal reflexes. He displays no atrophy and no tremor. No cranial nerve deficit or sensory deficit. He exhibits normal muscle tone. He displays a negative Romberg sign. Coordination and gait normal.   Skin: Skin is warm and dry. No rash noted. He is not diaphoretic. No erythema.   Psychiatric: He has a normal mood and affect. His behavior is normal. Judgment and thought content normal. He expresses no homicidal and no suicidal ideation. He expresses no suicidal plans and no homicidal plans.   Nursing note and vitals reviewed.      Vitals:    06/25/19 1010   BP: 100/69   BP Location: Left arm   Patient Position: Sitting   Pulse: 73   Temp: 98.5 °F (36.9 °C)   TempSrc: Oral   SpO2: 98%   Weight: 45.4 kg (100 lb)   Height: 175.3 cm (69.02\")       Patient's Body mass index is 14.76 kg/m². BMI is below normal parameters. Recommendations include: treating the underlying disease process.      Assessment/Plan   Patient Self-Management and Personalized Health Advice  The patient has been provided with information about: prevention of cardiac or vascular disease, fall prevention, designing advance directives and mental health concerns and preventive services including:   · Advance directive, Alcohol use counseling completed, Counseling for cardiovascular disease risk reduction, Diabetes screening, see lab orders, Exercise counseling provided, Fall Risk assessment done, Fall Risk plan of care done, " Glaucoma screening recommended, Nutrition counseling provided, Prostate cancer screening discussed.    Visit Diagnoses:    ICD-10-CM ICD-9-CM   1. Medicare annual wellness visit, subsequent Z00.00 V70.0   2. Other specified hypothyroidism E03.8 244.8       Orders Placed This Encounter   Procedures   • TSH     Standing Status:   Future     Number of Occurrences:   1     Standing Expiration Date:   6/25/2020   • T4, Free     Standing Status:   Future     Number of Occurrences:   1     Standing Expiration Date:   6/25/2020   • Comprehensive Metabolic Panel     Standing Status:   Future     Number of Occurrences:   1     Standing Expiration Date:   6/25/2020       Outpatient Encounter Medications as of 6/25/2019   Medication Sig Dispense Refill   • aclidinium bromide (TUDORZA PRESSAIR) 400 MCG/ACT aerosol powder  powder for inhalation Inhale 1 puff Daily. 1 each 5   • aspirin 81 MG EC tablet Take 1 tablet by mouth Daily. 30 tablet 5   • atorvastatin (LIPITOR) 20 MG tablet Take 1 tablet by mouth Daily. 30 tablet 5   • clopidogrel (PLAVIX) 75 MG tablet Take 1 tablet by mouth Daily. 30 tablet 11   • clotrimazole (LOTRIMIN) 1 % cream Apply  topically 2 (Two) Times a Day. Over the toenails 45 g 0   • ibuprofen (ADVIL,MOTRIN) 200 MG tablet Take 200 mg by mouth Every 6 (Six) Hours As Needed for mild pain (1-3).     • lisinopril (PRINIVIL,ZESTRIL) 10 MG tablet Take 1 tablet by mouth Daily. 30 tablet 5   • Multiple Vitamins-Minerals (PX COMPLETE SENIOR MULTIVITS PO) Take  by mouth.     • [DISCONTINUED] levothyroxine (SYNTHROID, LEVOTHROID) 125 MCG tablet Take 1 tablet by mouth Daily. 30 tablet 2   • [DISCONTINUED] HYDROcodone-acetaminophen (NORCO) 5-325 MG per tablet Take one tablets every 4-6 hours prn pain 20 tablet 0     No facility-administered encounter medications on file as of 6/25/2019.        Reviewed use of high risk medication in the elderly: yes  Reviewed for potential of harmful drug interactions in the elderly:  yes    Follow Up:  Return in about 3 months (around 9/25/2019).     An After Visit Summary and PPPS with all of these plans were given to the patient.

## 2019-07-18 NOTE — PROGRESS NOTES
[facility] CARDIOLOGY  [address]  [place] [address]  Phone: [phone]  Fax: [phone]    07/24/2019    Chief Complaint   Patient presents with   • Peripheral Vascular Disease   • Claudication        History:     Onel Erickson is a 79 y.o. male presents today for a regular follow up. PAD and claudication.Today he has complaints of dyspnea.   He denies any chest pain or palpitations, He was scheduled for an Arteriogram on 01/28/19 and was a no show. The patient states his left big toe has been amputated. Blood pressure and heart rate is low today.  He continues to loose weight.  He is still weak.    The chart and medications were reviewed.    Past Medical History:   Diagnosis Date   • Arthritis    • CAD (coronary artery disease)     Sees Dr. Harley   • Cataracts, bilateral    • COPD (chronic obstructive pulmonary disease) (CMS/[facility])    • Elevated cholesterol    • Former smoker     Stopped in 2012   • Hernia of abdominal wall    • Hiatal hernia    • History of TIAs    • Hyperlipidemia    • Hypertension    • Hypothyroidism    • Inguinal hernia    • Kidney disease    • PAD (peripheral artery disease) (CMS/[facility])    • Ruptured lumbar disc    • Scrotal hernia     Left   • Stroke (CMS/[facility]) 2011    10 YEARS AGO   • Ventral hernia        Past Surgical History:   Procedure Laterality Date   • AMPUTATION  06/2012    left great toe amputation   • AMPUTATION DIGIT Left 4/2/2019    Procedure: AMPUTATION DIGIT LEFT 2ND;  Surgeon: Dinorah Davis DPM;  Location: [facility] OR;  Service: Podiatry   • ARTERIAL BYPASS SURGERY  10/2012    left leg - groin to ankle; Dr. Browne; [facility].   • ARTERIOGRAM N/A 4/15/2019    Procedure: Arteriogram;  Surgeon: Valentin Fuentes MD;  Location: [facility] INVASIVE LOCATION;  Service: Cardiology   • BACK SURGERY      age 30; spinal fusion   • CARDIAC SURGERY  08/2012    Open heart bypass; [facility]   • CAROTID STENT Bilateral      Cage in right carotid   • CATARACT EXTRACTION  2016   • CORONARY ARTERY BYPASS GRAFT  2012    x5   • HERNIA REPAIR      age 35; inguinal hernia   • INGUINAL HERNIA REPAIR Left 9/15/2016    Procedure: INGUINAL HERNIA REPAIR;  Surgeon: Henry Bullock MD;  Location: [facility] OR;  Service:    • INGUINAL HERNIA REPAIR Left 2016    Procedure: INGUINAL HERNIA REPAIR;  Surgeon: Henry Bullock MD;  Location: [facility] OR;  Service:    • LUNG BIOPSY     • VASCULAR SURGERY     • VENTRAL/INCISIONAL HERNIA REPAIR N/A 9/15/2016    Procedure: VENTRAL/INCISIONAL HERNIA REPAIR;  Surgeon: Henry Bullock MD;  Location: [facility] OR;  Service:         Past Social History:  Social History     Socioeconomic History   • Marital status:      Spouse name: Not on file   • Number of children: Not on file   • Years of education: Not on file   • Highest education level: Not on file   Tobacco Use   • Smoking status: Former Smoker     Packs/day: 1.00     Years: 30.00     Pack years: 30.00     Types: Cigarettes     Last attempt to quit: 2012     Years since quittin.8   • Smokeless tobacco: Never Used   Substance and Sexual Activity   • Alcohol use: No   • Drug use: No   • Sexual activity: Defer       Past Family History:  Family History   Problem Relation Age of Onset   • Alzheimer's disease Mother    • Heart disease Father        Review of Systems:   Please see HPI  Constitution: No chills, no rigors, no unexplained weight loss or weight gain  Eyes:  No diplopia, no blurred vision, no loss of vision, conjunctiva is pink and sclera is anicteric  ENT:  No tinnitus, no otorrhea, no epistaxis, no sore throat   Respiratory: No cough, no hemoptysis  Cardiovascular: see HPI  Gastrointestinal: No nausea, no vomiting, no hematemesis, no diarrhea or constipation, no melena  Genitourinary: No frequency of dysuria no hematuria  Integument: No pruritis and  no skin rash  Hematologic / Lymphatic: No excessive bleeding, easy bruising,  fatigue, lymphadenopathy and petechiae  Musculoskeletal: No joint pain, joint stiffness, joint swelling, muscle pain, muscle weakness and neck pain  Neurological: No dizziness, headaches, light headedness, seizures and vertigo  Endocrine: No frequent urination and nocturia, temperature intolerance, weight gain, unintended and weight loss, unintended      Current Outpatient Medications   Medication Sig Dispense Refill   • aclidinium bromide (TUDORZA PRESSAIR) 400 MCG/ACT aerosol powder  powder for inhalation Inhale 1 puff Daily. 1 each 5   • aspirin 81 MG EC tablet Take 1 tablet by mouth Daily. 30 tablet 5   • atorvastatin (LIPITOR) 20 MG tablet Take 1 tablet by mouth Daily. 30 tablet 5   • ibuprofen (ADVIL,MOTRIN) 200 MG tablet Take 200 mg by mouth Every 6 (Six) Hours As Needed for mild pain (1-3).     • levothyroxine (SYNTHROID, LEVOTHROID) 137 MCG tablet Take 1 tablet by mouth Daily. 30 tablet 2   • lisinopril (PRINIVIL,ZESTRIL) 10 MG tablet Take 1 tablet by mouth Daily. 30 tablet 5   • Multiple Vitamins-Minerals (PX COMPLETE SENIOR MULTIVITS PO) Take  by mouth.     • clopidogrel (PLAVIX) 75 MG tablet Take 1 tablet by mouth Daily. 30 tablet 11   • clotrimazole (LOTRIMIN) 1 % cream Apply  topically 2 (Two) Times a Day. Over the toenails 45 g 0     No current facility-administered medications for this visit.         Allergies   Allergen Reactions   • Beta Adrenergic Blockers Other (See Comments)     Bottoms BP out       Objective:  Vitals:    07/24/19 1357   BP: (!) 87/57   Pulse: 56     Comfortable NAD  PERRL, conjunctiva clear  Neck supple, no JVD or thyromegaly appreciated  S1/S2 RRR, no m/r/g  Lungs CTA B, normal effort  Abdomen S/NT/ND (+) BS, no HSM appreciated  Extremities warm, no clubbing, cyanosis, or edema  Normal gait  No visible or palpable skin lesions  A/Ox4, mood and affect appropriate  Pulse exam:    Feet are warm bilateral  1+ edema bilateral  Capillary refill is normal  No evidence of ulceration  or color change of the toes  PULSES  Right DP and PT are 1+ and Left DP and PT are 2+    DATA:            Results for orders placed during the hospital encounter of 04/15/19   Arteriogram    Narrative PERIPHERAL ARTERIOGRAM / INTERVENTION REPORT     DATE OF PROCEDURE: 04/15/19     INDICATION FOR PROCEDURE: Critical Limb Ischemia. Non-healing ulcers   bilaterals. There is subacute osteomyelitis of the left foot wound. There   is remote hx of bilateral Fem-Pop bypasses.          PROCEDURE PERFORMED:   Right femoral artery access with 5 Sudanese sheath  Aortogram with bilateral lower extremity runoff       PROCEDURE COMMENTS:  Onel Erickson was brought to the cath lab and placed on the cardiac   catherization table in the postabsortive state. Time out was taken and   patient received moderate sedation. The patient was prepped and draped   according to the cath lab protocol under strict aseptic and sterile   condition. Patient's right femoral artery sight was prepped and draped.   Under fluoroscopic guidance theright femoral artery was punctured using a   21 gauge needle utilizing the modified Seldinger technique. 4 Kazakh   sheath was introduced over a wire. After aspirating for blood it was   flushed with heparinized saline.    Right extremity imaging was performed via the sheath. A 4F RIM catheter   was then advanced to the abdominal aorta at the level of L1 and aortogram   was performed. Catheter was then pulled back at the bifurcation at L5 and   engaged in the contralateral DONTE. Left extremity imaging was then   performed. After the procedure was completed the sheath was removed in the   cath lab and hemostasis achieved via manual compression. No complications   occurred. Patient tolerated the procedure well.     Findings:  Aorta:  Abdominal aorta shows only mild disease with heavy calcifications  Bilateral renal arteries are not visualized well due to poor filling  Patent Fem-Pop bypass bilateral  No vessels  seen below anastomosis.    Right Lower Extremity:   Common  Iliac artery Large fusiform aneurysm and heavy calcification  SFA shows no significant disease  Popliteal Artery was patent with no disease  Tibio-peroneal trunk patent with no disease  Anterior tibial artery was patent  Posterior tibial artery was patent  3 Vessel run off present    Left Lower Extremity:  Common iliac artery shows Small to medium size fusiform aneurysm and heavy   calcifications are present.  SFA is occluded. Fem-Pop bypass is patent but with very slow filling dur   to poor distal runoff and possible distal anastomosis stenosis.  Tibio-peroneal trunk could not be visualized due to poor filling.  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  Patent bilateral Fem-Pop bypasses  Severe distal disease with no runoff on both legs.     Recommendations:  I will discuss these findings with Dr. Sachin Browne who has done the Fem-Pop   bypasses to see if he would be a candidate for any intervention for   Aneurysm repair.  No targets are present and thus no options for his lower legs for   revascularization available.  He may need amputations and it appears that potential for stump healing   for BKA would be marginal but present.     EBL: 50 ML  No specimens were removed.    Plan  Patient will be set up to see Dr. Browne Vascular surgeon who has done   bypass for him in the past.     Valentin Fuentes MD    04/15/19        Director, Cardiac Cath Lab     Procedures       Assessment:    Critical Limb Ischemia  Non-healing ulcers bilaterally  Hypertension low today      Plan:    Medical management for PAD,   He is non-compliant with his healthcare.  Follow up in 4 months unless otherwise needed.       Patient's Body mass index is 14.24 kg/m². BMI is within normal parameters. No follow-up required..       No diagnosis found.      Thank you for allowing me to participate in the care of Onel Erickson. Feel free to contact me directly with any further questions or concerns.        Director, Cardiac Cath Lab    VANESSA Rodriguez, acting as scribe for Valentin Fuentes MD, FACC, [facility].   07/24/19  2:00 PM

## 2019-10-08 NOTE — TELEPHONE ENCOUNTER
----- Message from Deloris Lewis MD sent at 10/8/2019 12:26 AM EDT -----  Please call Onel and Reny. His thyroid has gone the other way. This may be why he is not feeling well. He should be on synthroid 137 mcg. Can I decrease him to synthroid 125 mcg orally daily? If he's okay with this, please send to pharmacy, #30 with 2 refills. Will recheck in three months. He doesn't have an appointment - please give him one in three months. Thanks.        Attempted to contact,states not available at this time.    Reny notified & verbalized understanding,agreeable,sent to pharmacy,follow up scheduled.

## 2019-10-21 NOTE — PROGRESS NOTES
"Onel Erickson     VITALS: Blood pressure 110/80, pulse (!) 49, temperature 98 °F (36.7 °C), temperature source Oral, height 175.3 cm (69.02\"), weight 43.8 kg (96 lb 9.6 oz), SpO2 91 %.    Subjective  Chief Complaint:   Chief Complaint   Patient presents with   • Shoulder Pain     Right         History of Present Illness:  Patient is a 79 y.o.  male with a medical history significant for CAD and COPD who presents to clinic secondary to medical followup.  Patient is here today with his wife Reny.  Since his last visit, he has completed the PAD work-up with Dr. Fuentes, but he is unsure of the next steps.  Review of Dr. Fuentes's notes show both medical management and possible follow-up with Dr. Browne, the vascular surgeon who had done the patient's bypass years earlier.  Patient today complains of right shoulder pain that has worsened for the last 2 weeks.  He is not sure how to describe the right shoulder pain.  He denies any history of trauma or injury.  Upon further questioning, he admits to chest pain diffusely across his left and right sides.  He describes fatigue.  He denies any nausea, vomiting, fever, or chills.  He does occasionally have the same kind of pain on his left upper extremity.  He denies any numbness or tingling.    Patient has chronic conditions of hyperlipidemia, hypothyroidism, CAD, and COPD.  These conditions have remained stable and unchanged.  He denies any side effects of his current medications.    No complaints about any of the medications.    The following portions of the patient's history were reviewed and updated as appropriate: allergies, current medications, past family history, past medical history, past social history, past surgical history and problem list.    Past Medical History  Past Medical History:   Diagnosis Date   • Arthritis    • CAD (coronary artery disease)     Sees Dr. Harley   • Cataracts, bilateral    • COPD (chronic obstructive pulmonary disease) (CMS/[facility])    • " Elevated cholesterol    • Former smoker     Stopped in 2012   • Hernia of abdominal wall    • Hiatal hernia    • History of TIAs    • Hyperlipidemia    • Hypertension    • Hypothyroidism    • Inguinal hernia    • Kidney disease    • PAD (peripheral artery disease) (CMS/HCC)    • Ruptured lumbar disc    • Scrotal hernia     Left   • Stroke (CMS/HCC) 2011    10 YEARS AGO   • Ventral hernia        Review of Systems   Respiratory: Negative for shortness of breath and wheezing.    Cardiovascular: Positive for chest pain. Negative for leg swelling.   Gastrointestinal: Negative for nausea and vomiting.   Musculoskeletal: Positive for arthralgias and myalgias.       Surgical History  Past Surgical History:   Procedure Laterality Date   • AMPUTATION  06/2012    left great toe amputation   • AMPUTATION DIGIT Left 4/2/2019    Procedure: AMPUTATION DIGIT LEFT 2ND;  Surgeon: Dinorah Davis DPM;  Location: [facility];  Service: Podiatry   • ARTERIAL BYPASS SURGERY  10/2012    left leg - groin to ankle; Dr. Browne; [facility].   • ARTERIOGRAM N/A 4/15/2019    Procedure: Arteriogram;  Surgeon: Valentin Fuentes MD;  Location: [facility] INVASIVE LOCATION;  Service: Cardiology   • BACK SURGERY      age 30; spinal fusion   • CARDIAC SURGERY  08/2012    Open heart bypass; [facility]   • CAROTID STENT Bilateral     Cage in right carotid   • CATARACT EXTRACTION  03/07/2016   • CORONARY ARTERY BYPASS GRAFT  2012    x5   • HERNIA REPAIR      age 35; inguinal hernia   • INGUINAL HERNIA REPAIR Left 9/15/2016    Procedure: INGUINAL HERNIA REPAIR;  Surgeon: Henry Bullock MD;  Location: [facility];  Service:    • INGUINAL HERNIA REPAIR Left 11/18/2016    Procedure: INGUINAL HERNIA REPAIR;  Surgeon: Henry Bullock MD;  Location: [facility];  Service:    • LUNG BIOPSY  2012   • VASCULAR SURGERY     • VENTRAL/INCISIONAL HERNIA REPAIR N/A 9/15/2016    Procedure: VENTRAL/INCISIONAL HERNIA REPAIR;  Surgeon: Henry Bullock MD;   Location: [facility] OR;  Service:        Family History  Family History   Problem Relation Age of Onset   • Alzheimer's disease Mother    • Heart disease Father        Social History  Social History     Socioeconomic History   • Marital status:      Spouse name: Not on file   • Number of children: Not on file   • Years of education: Not on file   • Highest education level: Not on file   Tobacco Use   • Smoking status: Former Smoker     Packs/day: 1.00     Years: 30.00     Pack years: 30.00     Types: Cigarettes     Last attempt to quit: 2012     Years since quittin.1   • Smokeless tobacco: Never Used   Substance and Sexual Activity   • Alcohol use: No   • Drug use: No   • Sexual activity: Defer       Objective  Physical Exam    Gen: Patient in NAD. Pleasant and answers appropriately. A&Ox3.    Skin: Warm and dry with normal turgor. No purpura, rashes, or unusual pigmentation noted. Hair is normal in appearance and distribution.    HEENT: NC/AT. No lesions noted. Conjunctiva clear, sclera nonicteric. PERRL. EOMI without nystagmus or strabismus. Fundi appear benign. No hemorrhages or exudates of eyes. Auditory canals are patent bilaterally without lesions. TMs intact,  nonerythematous, nonbulging without lesions. Nasal mucosa pink, nonerythematous, and nonedematous. Frontal and maxillary sinuses are nontender. O/P nonerythematous and moist without exudate.    Neck: Supple without lymph nodes palpated. FROM.     Lungs: CTA B/L without rales, rhonchi, crackles, or wheezes.    Heart: RRR. S1 and S2 normal. No S3 or S4. No MRGT.    Abd: Soft, nontender,nondistended. (+)BSx4 quadrants.     Extrem: No CCE. Radial pulses 2+/4 and equal B/L. FROMx4.  Upper extremities: Range of motion within normal limits.  No tenderness during palpation.  External and internal rotations within normal limits.  Slightly decreased abduction and adduction.    Neuro: No focal motor/sensory deficits.      ECG 12 Lead  Date/Time:  10/1/2019 11:33 AM  Performed by: Deloris Lewis MD  Authorized by: Deloris Lewis MD   Comparison: compared with previous ECG from 3/28/2019  Comparison to previous ECG: New first degree AV block. New T Wave abnormality.  Rhythm: sinus rhythm  Rate: normal  Conduction: 1st degree AV block  ST Segments: ST segments normal  QRS axis: right  Other findings: T wave abnormality    Clinical impression: abnormal EKG  Comments: Normal sinus rhythm without any ST or T acute changes.  First-degree AV block with occasional supraventricular premature complexes.  Borderline right axis deviation.  Possible pulmonary disease changes.  V2 has slight T wave abnormality.            Assessment/Plan  Onel Erickson is a 79 y.o. here for medical followup.  Diagnoses and all orders for this visit:    Other specified hypothyroidism  -     levothyroxine (SYNTHROID, LEVOTHROID) 137 MCG tablet; Take 1 tablet by mouth Daily.  -     TSH; Future  -     T4, Free; Future  -     TSH  -     T4, Free    Other hyperlipidemia  -     atorvastatin (LIPITOR) 20 MG tablet; Take 1 tablet by mouth Daily.  -     Lipid Panel; Future  -     Lipid Panel    Other chest pain  -     ECG 12 Lead  -     CBC & Differential; Future  -     Comprehensive Metabolic Panel; Future  -     Troponin  -     Troponin T  -     CK-MB  -     CBC & Differential  -     Comprehensive Metabolic Panel  -     CBC Auto Differential      Patient's Body mass index is 14.26 kg/m². BMI is below normal parameters. Recommendations include: treating the underlying disease process.     Findings and plans discussed with patient who verbalizes understanding and agreement. Will followup with patient once results are in. Patient to followup at clinic PRN or in three months for further medical followup.    Deloris Lewis MD    EMR Dragon/Transcription Disclaimer:  Much of this encounter note is an electronic transcription/translation of spoken language to printed text.  The  electronic translation of spoken language may permit erroneous, or at times, nonsensical words or phrases to be inadvertently transcribed.  Although I have reviewed the note for such errors, some may still exist.

## 2019-11-05 NOTE — TELEPHONE ENCOUNTER
Done. Would she like to pickup? Mailed? Faxed?      Spoke with Reny she will be by to pick it up today.

## 2019-11-22 PROBLEM — J96.01 ACUTE RESPIRATORY FAILURE WITH HYPOXIA (HCC): Status: ACTIVE | Noted: 2019-01-01

## 2019-12-01 NOTE — DISCHARGE SUMMARY
[facility] Medicine Services  DISCHARGE SUMMARY    Patient Identification:  Name:  Onel Erickson  Age:  79 y.o.  Sex:  male  :  1940  MRN:  [id number]  Visit Number:  [id number]    Date of Admission: 2019  Date of Death:  2019   Time of death: 1634    PCP: Deloris Lewis MD      Admission/Discharge Diagnoses     Discharge Diagnoses:  · Multiorgan failure due to advanced progressive dementia and COPD  · Acute hypoxic respiratory failure due to COPD  · New onset systolic heart failure  · Adult failure to thrive  · Progressive advanced Alzheimer's dementia  · Coronary artery disease  · Peripheral vascular disease  · AAA  · Hyperlipidemia  · Hypothyroidism  · Chronic kidney disease stage III     Consults/Procedures     Consults:   Consults     Date and Time Order Name Status Description    2019 1000 Inpatient Palliative Care MD Consult Completed     2019 1715 Inpatient Cardiology Consult Completed     2019 1622 IP General Consult (Use specialty-specific consult if known)             History of Presenting Illness   Mr. Onel Erickson, 79-year-old male, with history of end-stage COPD and dementia as well as multiple other medical problems was admitted due to progressive shortness of breath.    Hospital Course   Mr. Erickson is our 78 yo M with multiple medical problems, including end-stage COPD, who presented from PCP's office for hypoxia.  He was found to have acute newly onset systolic heart failure.  As well as advanced COPD.  2019.  Due to his multiple comorbidities and his failure to thrive along with Alzheimer's, patient has been made comfort measures by palliative care after conversation with his wife.  Patient had been mainly unresponsive and unable to take any oral medications for days.  Family had been at bedside on multiple days in the evening.  He passed away today at 1834 under hospice palliative comfort care measures. Patient was  pronounced by house supervisor on call.         Katayoun Behbahani, MD  Hospitalist Service -- [facility]       12/01/19  6:37 PM

## 2019-12-01 NOTE — PROGRESS NOTES
[facility] Medicine Services  PROGRESS NOTE     Patient Identification:  Name:  Onel Erickson  Age:  79 y.o.  Sex:  male  :  1940  MRN:  [id number]  Visit Number:  [id number]  Primary Care Provider:  Deloris Lewis MD    Length of stay:  9    ----------------------------------------------------------------------------------------------------------------------  Subjective     Chief Complaint:  Follow up for adult failure to thrive and severe COPD pursuing comfort care measures    Subjective:  Today, the patient is awake with eyes open but does not follow commands or respond to stimuli    ----------------------------------------------------------------------------------------------------------------------  Objective     Consults:  Palliative care    Current Hospital Meds:    ipratropium-albuterol 3 mL Nebulization 4x Daily - RT   Morphine 2 mg Intravenous Q6H   sodium chloride 10 mL Intravenous Q12H        ----------------------------------------------------------------------------------------------------------------------  Vital Signs:  Temp:  [96.6 °F (35.9 °C)-97.1 °F (36.2 °C)] 97.1 °F (36.2 °C)  Heart Rate:  [40-86] 40  Resp:  [10-16] 10  BP: ()/(45-69) 76/45  Mean Arterial Pressure (Non-Invasive) for the past 24 hrs (Last 3 readings):   Noninvasive MAP (mmHg)   19 1422 51   19 1024 73   19 0611 81     SpO2 Percentage    19 0637 19 1024 19 1422   SpO2: 90% 90% 90%     SpO2:  [90 %-93 %] 90 %  on  Flow (L/min):  [2] 2;   Device (Oxygen Therapy): nasal cannula    Body mass index is 10.15 kg/m².  Wt Readings from Last 3 Encounters:   19 34.9 kg (76 lb 14.4 oz)   19 46.3 kg (102 lb)   10/01/19 43.8 kg (96 lb 9.6 oz)      No intake or output data in the 24 hours ending 19 1639  NPO  Diet  ----------------------------------------------------------------------------------------------------------------------  Physical exam:   GEN: Cachectic, severely ill-appearing  NEURO: Partially awake, unresponsive to all stimuli except pain  PSYCH: Partially awake, not oriented unresponsive to most stimuli however appears comfortable  ------------------------------------------------------------------------------------------------------------------------------------------------------------------------------------------------------------------------------------  Assessment/Plan       · Multiorgan failure due to advanced progressive dementia and COPD  · Acute hypoxic respiratory failure due to COPD  · New onset systolic heart failure  · Adult failure to thrive  · Progressive advanced dementia  · Coronary artery disease  · Peripheral vascular disease  · AAA  · Hyperlipidemia  · Hypothyroidism  · Chronic kidney disease stage III     Continue comfort care    I have communicated poor prognosis with wife he she is in agreement with his current care.  She has been at bedside intermittently in the evenings.    --------------------------------------------------  DVT Prophylaxis: None needed due to comfort care measures     Disposition: Prognosis anticipated to be very poor likely hours to days.  However patient has so far for many days.  If continues to remain stable can discuss transition to inpatient hospice facility tomorrow.  --------------------------------------------------      Katayoun Behbahani, MD  Hospitalist Service -- [facility]     12/01/19  4:39 PM

## 2019-12-09 NOTE — PROGRESS NOTES
"Onel MONICA Georgina     VITALS: Blood pressure 102/73, pulse 63, temperature 95.8 °F (35.4 °C), temperature source Tympanic, height 175.3 cm (69.02\"), weight 46.3 kg (102 lb), SpO2 (!) 68 %.    Subjective  Chief Complaint:   Chief Complaint   Patient presents with   • Fatigue   • Memory Loss   • Rash        History of Present Illness:  Patient is a 79 y.o.  male with medical history significant for coronary artery disease, peripheral artery disease, and hypothyroidism who presents to clinic secondary to medical followup.  His wife Reny comes with him today.  Patient reports not feeling very well.  Wife reports that he has been having more memory changes of late.  He is having shortness of breath.  He is having multiple falls at home.  This is all new.  This has been happening for the last 2 to 3 weeks.  Since his last visit, he reports that Dr. Browne's office has called him and has established an appointment for follow-up for his vascular disease.  They are looking into possible surgery.    No complaints about any of the medications.    The following portions of the patient's history were reviewed and updated as appropriate: allergies, current medications, past family history, past medical history, past social history, past surgical history and problem list.    Past Medical History  Past Medical History:   Diagnosis Date   • Arthritis    • CAD (coronary artery disease)     Sees Dr. Harley   • Cataracts, bilateral    • COPD (chronic obstructive pulmonary disease) (CMS/[facility])    • Elevated cholesterol    • Former smoker     Stopped in 2012   • Hernia of abdominal wall    • Hiatal hernia    • History of TIAs    • Hyperlipidemia    • Hypertension    • Hypothyroidism    • Inguinal hernia    • Kidney disease    • PAD (peripheral artery disease) (CMS/HCC)    • Ruptured lumbar disc    • Scrotal hernia     Left   • Stroke (CMS/HCC) 2011    10 YEARS AGO   • Ventral hernia        Review of Systems   Constitutional: Positive " for fatigue. Negative for chills and fever.   Respiratory: Positive for shortness of breath. Negative for cough and wheezing.    Cardiovascular: Negative for chest pain and palpitations.       Surgical History  Past Surgical History:   Procedure Laterality Date   • AMPUTATION  06/2012    left great toe amputation   • AMPUTATION DIGIT Left 4/2/2019    Procedure: AMPUTATION DIGIT LEFT 2ND;  Surgeon: Dinorah Davis DPM;  Location: [facility];  Service: Podiatry   • ARTERIAL BYPASS SURGERY  10/2012    left leg - groin to ankle; Dr. Browne; [facility].   • ARTERIOGRAM N/A 4/15/2019    Procedure: Arteriogram;  Surgeon: Valentin Fuentes MD;  Location: [facility] INVASIVE LOCATION;  Service: Cardiology   • BACK SURGERY      age 30; spinal fusion   • CARDIAC SURGERY  08/2012    Open heart bypass; [facility]   • CAROTID STENT Bilateral     Cage in right carotid   • CATARACT EXTRACTION  03/07/2016   • CORONARY ARTERY BYPASS GRAFT  2012    x5   • HERNIA REPAIR      age 35; inguinal hernia   • INGUINAL HERNIA REPAIR Left 9/15/2016    Procedure: INGUINAL HERNIA REPAIR;  Surgeon: Henry Bullock MD;  Location: [facility] OR;  Service:    • INGUINAL HERNIA REPAIR Left 11/18/2016    Procedure: INGUINAL HERNIA REPAIR;  Surgeon: Henry Bullock MD;  Location: [facility];  Service:    • LUNG BIOPSY  2012   • VASCULAR SURGERY     • VENTRAL/INCISIONAL HERNIA REPAIR N/A 9/15/2016    Procedure: VENTRAL/INCISIONAL HERNIA REPAIR;  Surgeon: Henry Bullock MD;  Location: [facility] OR;  Service:        Family History  Family History   Problem Relation Age of Onset   • Alzheimer's disease Mother    • Heart disease Father        Social History  Social History     Socioeconomic History   • Marital status:      Spouse name: Not on file   • Number of children: Not on file   • Years of education: Not on file   • Highest education level: Not on file   Tobacco Use   • Smoking status: Former Smoker     Packs/day: 1.00     Years: 30.00      Pack years: 30.00     Types: Cigarettes     Last attempt to quit: 2012     Years since quittin.2   • Smokeless tobacco: Never Used   Substance and Sexual Activity   • Alcohol use: No   • Drug use: No   • Sexual activity: Defer       Objective  Physical Exam    Gen: Patient in NAD. Hard to get pulse and O2 sats. Decreased temp.    Skin: Warm and cool with normal turgor. No purpura, rashes, or unusual pigmentation noted. Hair is normal in appearance and distribution.    HEENT: NC/AT. No lesions noted. Conjunctiva clear, sclera nonicteric. PERRL. EOMI without nystagmus or strabismus. Fundi appear benign. No hemorrhages or exudates of eyes. Auditory canals are patent bilaterally without lesions. TMs intact,  nonerythematous, nonbulging without lesions. Nasal mucosa pink, nonerythematous, and nonedematous. Frontal and maxillary sinuses are nontender. O/P nonerythematous and moist without exudate.    Neck: Supple without lymph nodes palpated. FROM.     Lungs: Decreased B/L without rales, rhonchi, crackles, or wheezes.    Heart: Distant. S1 and S2 normal. No S3 or S4. No MRGT.    Abd: Soft, nontender,nondistended. (+)BSx4 quadrants.     Extrem: No CCE. Radial pulses 1+/4 and equal B/L. FROMx4. No bone, joint, or muscle tenderness noted.    Neuro: No focal motor/sensory deficits.    Procedures    Assessment/Plan  Onel Erickson is a 79 y.o. here for medical followup.  Diagnoses and all orders for this visit:    Shortness of breath  Secondary to decreased oxygen saturations and low temperature, will send to ER for further follow-up. ?  Possible sepsis.    Bradycardia  Secondary to decreased oxygen saturations and low temperature, will send to ER for further follow-up. ?  Possible sepsis.    Multiple falls  Secondary to decreased oxygen saturations and low temperature, will send to ER for further follow-up. ?  Possible sepsis.      Patient's Body mass index is 15.06 kg/m². BMI is below normal parameters.  Recommendations include: treating the underlying disease process.     Findings and plans discussed with patient who verbalizes understanding and agreement. Will followup with patient once results are in. Patient to followup at clinic PRN or in one week for further medical followup.    MD ESTELLE Hooker/Transcription Disclaimer:  Much of this encounter note is an electronic transcription/translation of spoken language to printed text.  The electronic translation of spoken language may permit erroneous, or at times, nonsensical words or phrases to be inadvertently transcribed.  Although I have reviewed the note for such errors, some may still exist.

## 2020-01-07 DIAGNOSIS — E03.8 OTHER SPECIFIED HYPOTHYROIDISM: ICD-10-CM

## 2020-01-07 RX ORDER — LEVOTHYROXINE SODIUM 0.12 MG/1
TABLET ORAL
Qty: 90 TABLET | Refills: 1 | OUTPATIENT
Start: 2020-01-07

## (undated) DEVICE — DRSNG PAD ABD 8X10IN STRL

## (undated) DEVICE — DRSNG SURESITE WNDW 4X4.5

## (undated) DEVICE — CUFF TOURNI 1BLADDER 1PRT 12IN STRL

## (undated) DEVICE — RADIFOCUS GLIDEWIRE: Brand: GLIDEWIRE

## (undated) DEVICE — ADHS LIQ MASTISOL 2/3ML

## (undated) DEVICE — SHEATH INTRO SUPERSHEATH JWIRE .035 5F 11CM

## (undated) DEVICE — HOLDER: Brand: DEROYAL

## (undated) DEVICE — SUT MNCRYL 4/0 PS2 18 IN

## (undated) DEVICE — ENCORE® LATEX MICRO SIZE 6.5, STERILE LATEX POWDER-FREE SURGICAL GLOVE: Brand: ENCORE

## (undated) DEVICE — PK CATH CARD 70

## (undated) DEVICE — ANTIBACTERIAL UNDYED BRAIDED (POLYGLACTIN 910), SYNTHETIC ABSORBABLE SUTURE: Brand: COATED VICRYL

## (undated) DEVICE — DRSNG WND GZ CURAD OIL EMULSION 3X3IN STRL

## (undated) DEVICE — LN INJ CONTRST FLXCIL HP F/M LL 1200PSI10

## (undated) DEVICE — SUT NLY 2/0 664G

## (undated) DEVICE — ST EXT IV SMARTSITE 2VLV SP M LL 5ML IV1

## (undated) DEVICE — RADIFOCUS GLIDECATH: Brand: GLIDECATH

## (undated) DEVICE — SPNG GZ STRL 2S 4X4 12PLY

## (undated) DEVICE — UNDERCAST PADDING: Brand: DEROYAL

## (undated) DEVICE — 3M™ STERI-STRIP™ REINFORCED ADHESIVE SKIN CLOSURES, R1547, 1/2 IN X 4 IN (12 MM X 100 MM), 6 STRIPS/ENVELOPE: Brand: 3M™ STERI-STRIP™

## (undated) DEVICE — SUT MNCRYL 3/0 SH 27 IN Y416H

## (undated) DEVICE — ST INF PRI SMRTSTE 20DRP 2VLV 24ML 117

## (undated) DEVICE — ADULT DISPOSABLE SINGLE-PATIENT USE PULSE OXIMETER SENSOR: Brand: NONIN

## (undated) DEVICE — KT MINI ACC 5F .18X40CM SS 21G 7CM

## (undated) DEVICE — CANNULA,OXY,ADULT,SUPER SOFT,W/14'TUB,UC: Brand: MEDLINE INDUSTRIES, INC.

## (undated) DEVICE — SUT ETHLN 4/0 P3 18IN 699G

## (undated) DEVICE — PK EXTREM LOWR 70

## (undated) DEVICE — 3M™ STERI-DRAPE™ U-DRAPE 1015: Brand: STERI-DRAPE™

## (undated) DEVICE — SUT ETHLN 3/0 FS1 663G

## (undated) DEVICE — ENCORE® LATEX MICRO SIZE 7, STERILE LATEX POWDER-FREE SURGICAL GLOVE: Brand: ENCORE

## (undated) DEVICE — Device: Brand: MEDEX

## (undated) DEVICE — BNDG ELAS CO-FLEX SLF ADHR 4IN5YD LF STRL

## (undated) DEVICE — BNDG GZ SOF-FORM CONFRM 2X75IN LF STRL

## (undated) DEVICE — BNDG ELAS ELITE V/CLOSE 6IN 5YD LF STRL